# Patient Record
Sex: FEMALE | Race: WHITE | NOT HISPANIC OR LATINO | Employment: OTHER | URBAN - NONMETROPOLITAN AREA
[De-identification: names, ages, dates, MRNs, and addresses within clinical notes are randomized per-mention and may not be internally consistent; named-entity substitution may affect disease eponyms.]

---

## 2022-02-22 ENCOUNTER — HOSPITAL ENCOUNTER (OUTPATIENT)
Dept: MRI IMAGING | Facility: HOSPITAL | Age: 72
Discharge: HOME | End: 2022-02-22
Payer: COMMERCIAL

## 2022-02-22 DIAGNOSIS — M84.371A STRESS FRACTURE, RIGHT ANKLE, INITIAL ENCOUNTER FOR FRACTURE: ICD-10-CM

## 2022-02-22 DIAGNOSIS — M19.271: ICD-10-CM

## 2022-02-22 PROCEDURE — 73721 MRI JNT OF LWR EXTRE W/O DYE: CPT

## 2022-02-22 PROCEDURE — G1004 CDSM NDSC: HCPCS

## 2022-06-02 ENCOUNTER — APPOINTMENT (EMERGENCY)
Dept: RADIOLOGY | Facility: HOSPITAL | Age: 72
End: 2022-06-02
Payer: COMMERCIAL

## 2022-06-02 ENCOUNTER — APPOINTMENT (EMERGENCY)
Dept: CT IMAGING | Facility: HOSPITAL | Age: 72
End: 2022-06-02
Payer: COMMERCIAL

## 2022-06-02 ENCOUNTER — HOSPITAL ENCOUNTER (EMERGENCY)
Facility: HOSPITAL | Age: 72
Discharge: HOME | End: 2022-06-03
Attending: EMERGENCY MEDICINE | Admitting: EMERGENCY MEDICINE
Payer: COMMERCIAL

## 2022-06-02 DIAGNOSIS — R32 URINARY INCONTINENCE: ICD-10-CM

## 2022-06-02 DIAGNOSIS — M54.50 ACUTE LOW BACK PAIN: Primary | ICD-10-CM

## 2022-06-02 LAB
ALBUMIN SERPL BCP-MCNC: 3.7 G/DL (ref 3.5–5)
ALP SERPL-CCNC: 85 U/L (ref 46–116)
ALT SERPL W P-5'-P-CCNC: 13 U/L (ref 12–78)
ANION GAP SERPL CALCULATED.3IONS-SCNC: 10 MMOL/L (ref 4–13)
AST SERPL W P-5'-P-CCNC: 19 U/L (ref 5–45)
BASOPHILS # BLD AUTO: 0.03 THOUSANDS/ΜL (ref 0–0.1)
BASOPHILS NFR BLD AUTO: 0 % (ref 0–1)
BILIRUB SERPL-MCNC: 0.66 MG/DL (ref 0.2–1)
BILIRUB UR QL STRIP: NEGATIVE
BUN SERPL-MCNC: 15 MG/DL (ref 5–25)
CALCIUM SERPL-MCNC: 8 MG/DL (ref 8.3–10.1)
CHLORIDE SERPL-SCNC: 98 MMOL/L (ref 100–108)
CLARITY UR: CLEAR
CO2 SERPL-SCNC: 24 MMOL/L (ref 21–32)
COLOR UR: YELLOW
CREAT SERPL-MCNC: 1.03 MG/DL (ref 0.6–1.3)
EOSINOPHIL # BLD AUTO: 0.02 THOUSAND/ΜL (ref 0–0.61)
EOSINOPHIL NFR BLD AUTO: 0 % (ref 0–6)
ERYTHROCYTE [DISTWIDTH] IN BLOOD BY AUTOMATED COUNT: 12.6 % (ref 11.6–15.1)
GFR SERPL CREATININE-BSD FRML MDRD: 54 ML/MIN/1.73SQ M
GLUCOSE SERPL-MCNC: 155 MG/DL (ref 65–140)
GLUCOSE UR STRIP-MCNC: NEGATIVE MG/DL
HCT VFR BLD AUTO: 37.7 % (ref 34.8–46.1)
HGB BLD-MCNC: 12.2 G/DL (ref 11.5–15.4)
HGB UR QL STRIP.AUTO: ABNORMAL
IMM GRANULOCYTES # BLD AUTO: 0.06 THOUSAND/UL (ref 0–0.2)
IMM GRANULOCYTES NFR BLD AUTO: 1 % (ref 0–2)
KETONES UR STRIP-MCNC: ABNORMAL MG/DL
LACTATE SERPL-SCNC: 1.4 MMOL/L (ref 0.5–2)
LEUKOCYTE ESTERASE UR QL STRIP: NEGATIVE
LYMPHOCYTES # BLD AUTO: 0.87 THOUSANDS/ΜL (ref 0.6–4.47)
LYMPHOCYTES NFR BLD AUTO: 7 % (ref 14–44)
MCH RBC QN AUTO: 30 PG (ref 26.8–34.3)
MCHC RBC AUTO-ENTMCNC: 32.4 G/DL (ref 31.4–37.4)
MCV RBC AUTO: 93 FL (ref 82–98)
MONOCYTES # BLD AUTO: 0.81 THOUSAND/ΜL (ref 0.17–1.22)
MONOCYTES NFR BLD AUTO: 7 % (ref 4–12)
NEUTROPHILS # BLD AUTO: 10.57 THOUSANDS/ΜL (ref 1.85–7.62)
NEUTS SEG NFR BLD AUTO: 85 % (ref 43–75)
NITRITE UR QL STRIP: NEGATIVE
NRBC BLD AUTO-RTO: 0 /100 WBCS
PH UR STRIP.AUTO: 6 [PH]
PLATELET # BLD AUTO: 230 THOUSANDS/UL (ref 149–390)
PMV BLD AUTO: 10.5 FL (ref 8.9–12.7)
POTASSIUM SERPL-SCNC: 3.5 MMOL/L (ref 3.5–5.3)
PROCALCITONIN SERPL-MCNC: 0.08 NG/ML
PROT SERPL-MCNC: 6.9 G/DL (ref 6.4–8.2)
PROT UR STRIP-MCNC: NEGATIVE MG/DL
RBC # BLD AUTO: 4.07 MILLION/UL (ref 3.81–5.12)
SODIUM SERPL-SCNC: 132 MMOL/L (ref 136–145)
SP GR UR STRIP.AUTO: 1.02 (ref 1–1.03)
UROBILINOGEN UR QL STRIP.AUTO: 0.2 E.U./DL
WBC # BLD AUTO: 12.36 THOUSAND/UL (ref 4.31–10.16)

## 2022-06-02 PROCEDURE — 99285 EMERGENCY DEPT VISIT HI MDM: CPT | Performed by: EMERGENCY MEDICINE

## 2022-06-02 PROCEDURE — 81001 URINALYSIS AUTO W/SCOPE: CPT | Performed by: EMERGENCY MEDICINE

## 2022-06-02 PROCEDURE — 96374 THER/PROPH/DIAG INJ IV PUSH: CPT

## 2022-06-02 PROCEDURE — 99284 EMERGENCY DEPT VISIT MOD MDM: CPT

## 2022-06-02 PROCEDURE — 83605 ASSAY OF LACTIC ACID: CPT | Performed by: EMERGENCY MEDICINE

## 2022-06-02 PROCEDURE — 87077 CULTURE AEROBIC IDENTIFY: CPT | Performed by: EMERGENCY MEDICINE

## 2022-06-02 PROCEDURE — 85025 COMPLETE CBC W/AUTO DIFF WBC: CPT | Performed by: EMERGENCY MEDICINE

## 2022-06-02 PROCEDURE — 87040 BLOOD CULTURE FOR BACTERIA: CPT | Performed by: EMERGENCY MEDICINE

## 2022-06-02 PROCEDURE — 74176 CT ABD & PELVIS W/O CONTRAST: CPT

## 2022-06-02 PROCEDURE — NC001 PR NO CHARGE: Performed by: EMERGENCY MEDICINE

## 2022-06-02 PROCEDURE — 96375 TX/PRO/DX INJ NEW DRUG ADDON: CPT

## 2022-06-02 PROCEDURE — 93005 ELECTROCARDIOGRAM TRACING: CPT

## 2022-06-02 PROCEDURE — 71045 X-RAY EXAM CHEST 1 VIEW: CPT

## 2022-06-02 PROCEDURE — 36415 COLL VENOUS BLD VENIPUNCTURE: CPT | Performed by: EMERGENCY MEDICINE

## 2022-06-02 PROCEDURE — 80053 COMPREHEN METABOLIC PANEL: CPT | Performed by: EMERGENCY MEDICINE

## 2022-06-02 PROCEDURE — 84145 PROCALCITONIN (PCT): CPT | Performed by: EMERGENCY MEDICINE

## 2022-06-02 PROCEDURE — 96361 HYDRATE IV INFUSION ADD-ON: CPT

## 2022-06-02 PROCEDURE — 87154 CUL TYP ID BLD PTHGN 6+ TRGT: CPT | Performed by: EMERGENCY MEDICINE

## 2022-06-02 RX ORDER — SIMVASTATIN 10 MG
TABLET ORAL
COMMUNITY

## 2022-06-02 RX ORDER — NAPROXEN 250 MG/1
250 TABLET ORAL
COMMUNITY
Start: 2022-01-10

## 2022-06-02 RX ORDER — LEVOTHYROXINE SODIUM 0.05 MG/1
TABLET ORAL
COMMUNITY
Start: 2022-05-24

## 2022-06-02 RX ORDER — SODIUM CHLORIDE 9 MG/ML
125 INJECTION, SOLUTION INTRAVENOUS CONTINUOUS
Status: DISCONTINUED | OUTPATIENT
Start: 2022-06-02 | End: 2022-06-03 | Stop reason: HOSPADM

## 2022-06-02 RX ORDER — ONDANSETRON 2 MG/ML
4 INJECTION INTRAMUSCULAR; INTRAVENOUS ONCE
Status: COMPLETED | OUTPATIENT
Start: 2022-06-02 | End: 2022-06-02

## 2022-06-02 RX ORDER — KETOROLAC TROMETHAMINE 30 MG/ML
15 INJECTION, SOLUTION INTRAMUSCULAR; INTRAVENOUS ONCE
Status: COMPLETED | OUTPATIENT
Start: 2022-06-02 | End: 2022-06-02

## 2022-06-02 RX ORDER — PIMAVANSERIN TARTRATE 34 MG/1
CAPSULE ORAL
COMMUNITY
Start: 2022-03-23

## 2022-06-02 RX ORDER — OMEPRAZOLE 20 MG/1
TABLET, DELAYED RELEASE ORAL
COMMUNITY

## 2022-06-02 RX ORDER — EZETIMIBE 10 MG/1
TABLET ORAL
COMMUNITY
Start: 2022-05-24

## 2022-06-02 RX ADMIN — ONDANSETRON 4 MG: 2 INJECTION INTRAMUSCULAR; INTRAVENOUS at 22:48

## 2022-06-02 RX ADMIN — KETOROLAC TROMETHAMINE 15 MG: 30 INJECTION, SOLUTION INTRAMUSCULAR at 22:49

## 2022-06-02 RX ADMIN — SODIUM CHLORIDE 500 ML: 0.9 INJECTION, SOLUTION INTRAVENOUS at 22:52

## 2022-06-02 RX ADMIN — SODIUM CHLORIDE 125 ML/HR: 0.9 INJECTION, SOLUTION INTRAVENOUS at 23:44

## 2022-06-03 VITALS
WEIGHT: 209.88 LBS | OXYGEN SATURATION: 97 % | SYSTOLIC BLOOD PRESSURE: 140 MMHG | TEMPERATURE: 98 F | HEIGHT: 63 IN | DIASTOLIC BLOOD PRESSURE: 73 MMHG | RESPIRATION RATE: 19 BRPM | BODY MASS INDEX: 37.19 KG/M2 | HEART RATE: 86 BPM

## 2022-06-03 LAB
ATRIAL RATE: 95 BPM
BACTERIA UR QL AUTO: NORMAL /HPF
COARSE GRAN CASTS URNS QL MICRO: NORMAL /LPF
NON-SQ EPI CELLS URNS QL MICRO: NORMAL /HPF
P AXIS: 52 DEGREES
PR INTERVAL: 134 MS
QRS AXIS: -39 DEGREES
QRSD INTERVAL: 86 MS
QT INTERVAL: 366 MS
QTC INTERVAL: 459 MS
RBC #/AREA URNS AUTO: NORMAL /HPF
T WAVE AXIS: 81 DEGREES
VENTRICULAR RATE: 95 BPM
WBC #/AREA URNS AUTO: NORMAL /HPF

## 2022-06-03 NOTE — ED PROVIDER NOTES
History  Chief Complaint   Patient presents with    Back Pain     Lower back pain that radiates across and down both legs  Pt also complaining of Shortness of breath      77-year-old female with a history of Parkinson's disease presents emergency room with chief complaint of fever at home, bilateral flank pain and urinary incontinence  Patient reports a chronic history of mild urinary incontinence  She reports tonight while getting ready for bed it was much worse  Denies any dysuria or polyuria  No urgency or frequency  Also reports that she had some mild nausea and which she describes as mild regurgitation  She had reported shortness of breath  to nursing staff denies this to me at this time  No chest pain  Patient does report some radiation of the pain down into her legs  Patient reports that she does have history of sciatica as well  She reports that some of this radiation is chronic  History provided by:  Patient  Fever - 9 weeks to 74 years  Temp source:  Subjective  Severity:  Unable to specify  Onset quality:  Unable to specify  Progression:  Resolved  Chronicity:  New  Relieved by:  None tried  Worsened by:  Nothing  Associated symptoms: nausea and vomiting    Associated symptoms: no chest pain, no chills, no confusion, no congestion, no cough, no diarrhea, no dysuria, no ear pain, no headaches, no myalgias, no rash, no rhinorrhea and no sore throat    Associated symptoms comment:  Incontinence  Bilateral flank pain      Prior to Admission Medications   Prescriptions Last Dose Informant Patient Reported? Taking?    Pimavanserin Tartrate (Nuplazid) 34 MG CAPS   Yes Yes   carbidopa-levodopa (SINEMET)  mg per tablet   Yes No   Sig: Take 1 tablet by mouth Three times a day   ezetimibe (ZETIA) 10 mg tablet   Yes Yes   Sig: Take by mouth   levothyroxine 50 mcg tablet   Yes Yes   Sig: TAKE 1 TABLET BY MOUTH DAILY AT LEAST 30 MINUTES PRIOR TO FIRST MEAL OF THE DAY OR OTHER MEDICATIONS metoprolol tartrate (LOPRESSOR) 25 mg tablet   Yes Yes   Sig: Take by mouth   naproxen (NAPROSYN) 250 mg tablet   Yes Yes   Sig: Take 250 mg by mouth   omeprazole (PriLOSEC OTC) 20 MG tablet   Yes No   Sig: Take by mouth   simvastatin (ZOCOR) 10 mg tablet   Yes No   Sig: Take by mouth      Facility-Administered Medications: None       Past Medical History:   Diagnosis Date    Parkinson disease ([facility] [address] )        History reviewed  No pertinent surgical history  History reviewed  No pertinent family history  I have reviewed and agree with the history as documented  E-Cigarette/Vaping     E-Cigarette/Vaping Substances     Social History     Tobacco Use    Smoking status: Never Smoker    Smokeless tobacco: Never Used   Substance Use Topics    Alcohol use: Not Currently    Drug use: Never       Review of Systems   Constitutional: Positive for fever  Negative for activity change, chills and fatigue  HENT: Negative for congestion, ear pain, rhinorrhea, sinus pressure and sore throat  Eyes: Negative  Respiratory: Positive for shortness of breath (Resolved)  Negative for cough, chest tightness and wheezing  Cardiovascular: Negative  Negative for chest pain, palpitations and leg swelling  Gastrointestinal: Positive for nausea and vomiting  Negative for abdominal pain and diarrhea  Endocrine: Negative  Genitourinary: Positive for flank pain  Negative for difficulty urinating, dysuria, frequency and urgency  Musculoskeletal: Positive for back pain  Negative for arthralgias and myalgias  Skin: Negative  Negative for pallor and rash  Allergic/Immunologic: Negative  Neurological: Positive for weakness  Negative for dizziness and headaches  Hematological: Negative  Psychiatric/Behavioral: Negative  Negative for confusion  All other systems reviewed and are negative  Physical Exam  Physical Exam  Vitals and nursing note reviewed  Constitutional:       General: She is awake   She is in acute distress  Appearance: Normal appearance  She is well-developed  She is obese  She is not ill-appearing or toxic-appearing  HENT:      Head: Normocephalic and atraumatic  Hair is normal       Jaw: No pain on movement  Right Ear: External ear normal       Left Ear: External ear normal       Nose: Nose normal    Eyes:      General: Lids are normal  No scleral icterus  Extraocular Movements: Extraocular movements intact  Pupils: Pupils are equal, round, and reactive to light  Cardiovascular:      Rate and Rhythm: Normal rate and regular rhythm  Heart sounds: Normal heart sounds  No murmur heard  Pulmonary:      Effort: Pulmonary effort is normal  No respiratory distress  Breath sounds: Normal breath sounds  No stridor  No wheezing or rales  Abdominal:      General: Abdomen is flat  There is no distension  Palpations: Abdomen is soft  Tenderness: There is abdominal tenderness  There is right CVA tenderness and left CVA tenderness  There is no guarding  Musculoskeletal:         General: No deformity  Normal range of motion  Cervical back: Normal range of motion and neck supple  Skin:     General: Skin is warm and dry  Coloration: Skin is not jaundiced or pale  Findings: No rash  Neurological:      Mental Status: She is alert and oriented to person, place, and time  Mental status is at baseline  Cranial Nerves: No cranial nerve deficit  Psychiatric:         Attention and Perception: Attention normal          Mood and Affect: Mood normal          Speech: Speech normal          Behavior: Behavior normal          Thought Content:  Thought content normal          Judgment: Judgment normal          Vital Signs  ED Triage Vitals [06/02/22 2203]   Temperature Pulse Respirations Blood Pressure SpO2   98 °F (36 7 °C) 97 16 (!) 190/90 97 %      Temp Source Heart Rate Source Patient Position - Orthostatic VS BP Location FiO2 (%)   Temporal Monitor Sitting Right arm --      Pain Score       10 - Worst Possible Pain           Vitals:    06/02/22 2203   BP: (!) 190/90   Pulse: 97   Patient Position - Orthostatic VS: Sitting         Visual Acuity      ED Medications  Medications   sodium chloride 0 9 % bolus 500 mL (500 mL Intravenous New Bag 6/2/22 2252)   sodium chloride 0 9 % infusion (has no administration in time range)   ondansetron (ZOFRAN) injection 4 mg (4 mg Intravenous Given 6/2/22 2248)   ketorolac (TORADOL) injection 15 mg (15 mg Intravenous Given 6/2/22 2249)       Diagnostic Studies  Results Reviewed     Procedure Component Value Units Date/Time    UA w Reflex to Microscopic w Reflex to Culture [[id number]] Collected: 06/02/22 2337    Lab Status: No result Specimen: Urine, Clean Catch     Lactic acid [[id number]]  (Normal) Collected: 06/02/22 2239    Lab Status: Final result Specimen: Blood from Arm, Right Updated: 06/02/22 2310     LACTIC ACID 1 4 mmol/L     Narrative:      Result may be elevated if tourniquet was used during collection      Procalcitonin [[id number]]  (Normal) Collected: 06/02/22 2239    Lab Status: Final result Specimen: Blood from Arm, Right Updated: 06/02/22 2309     Procalcitonin 0 08 ng/ml     Comprehensive metabolic panel [[id number]]  (Abnormal) Collected: 06/02/22 2239    Lab Status: Final result Specimen: Blood from Arm, Right Updated: 06/02/22 2305     Sodium 132 mmol/L      Potassium 3 5 mmol/L      Chloride 98 mmol/L      CO2 24 mmol/L      ANION GAP 10 mmol/L      BUN 15 mg/dL      Creatinine 1 03 mg/dL      Glucose 155 mg/dL      Calcium 8 0 mg/dL      AST 19 U/L      ALT 13 U/L      Alkaline Phosphatase 85 U/L      Total Protein 6 9 g/dL      Albumin 3 7 g/dL      Total Bilirubin 0 66 mg/dL      eGFR 54 ml/min/1 73sq m     Narrative:      [place] guidelines for Chronic Kidney Disease (CKD):     Stage 1 with normal or high GFR (GFR > 90 mL/min/1 73 square meters)    Stage 2 Mild CKD (GFR = 60-89 mL/min/1 73 square meters)    Stage 3A Moderate CKD (GFR = 45-59 mL/min/1 73 square meters)    Stage 3B Moderate CKD (GFR = 30-44 mL/min/1 73 square meters)    Stage 4 Severe CKD (GFR = 15-29 mL/min/1 73 square meters)    Stage 5 End Stage CKD (GFR <15 mL/min/1 73 square meters)  Note: GFR calculation is accurate only with a steady state creatinine    CBC and differential [[id number]]  (Abnormal) Collected: 06/02/22 2239    Lab Status: Final result Specimen: Blood from Arm, Right Updated: 06/02/22 2301     WBC 12 36 Thousand/uL      RBC 4 07 Million/uL      Hemoglobin 12 2 g/dL      Hematocrit 37 7 %      MCV 93 fL      MCH 30 0 pg      MCHC 32 4 g/dL      RDW 12 6 %      MPV 10 5 fL      Platelets 016 Thousands/uL      nRBC 0 /100 WBCs      Neutrophils Relative 85 %      Immat GRANS % 1 %      Lymphocytes Relative 7 %      Monocytes Relative 7 %      Eosinophils Relative 0 %      Basophils Relative 0 %      Neutrophils Absolute 10 57 Thousands/µL      Immature Grans Absolute 0 06 Thousand/uL      Lymphocytes Absolute 0 87 Thousands/µL      Monocytes Absolute 0 81 Thousand/µL      Eosinophils Absolute 0 02 Thousand/µL      Basophils Absolute 0 03 Thousands/µL     Blood culture #1 [[id number]] Collected: 06/02/22 2252    Lab Status: In process Specimen: Blood from Arm, Left Updated: 06/02/22 2256    Blood culture #2 [[id number]] Collected: 06/02/22 2239    Lab Status:  In process Specimen: Blood from Arm, Right Updated: 06/02/22 2242                 XR chest portable    (Results Pending)   CT renal stone study abdomen pelvis wo contrast    (Results Pending)              Procedures  ECG 12 Lead Documentation Only    Date/Time: 6/2/2022 10:56 PM  Performed by: Sherry Black DO  Authorized by: Sherry Black DO     Indications / Diagnosis:  Weakness  ECG reviewed by me, the ED Provider: yes    Patient location:  ED  Previous ECG:     Previous ECG:  Unavailable  Interpretation:     Interpretation: non-specific Rate:     ECG rate assessment: normal    Rhythm:     Rhythm: sinus rhythm    Ectopy:     Ectopy: none    QRS:     QRS axis:  Left  Conduction:     Conduction: normal    ST segments:     ST segments:  Non-specific  T waves:     T waves: non-specific               ED Course  ED Course as of 06/02/22 2341   Thu Jun 02, 2022   2323  at bedside  Is uncertain with regards to the patient's temperature at home but believes it may have been "99 8"   2339 Signed out to Dr Reagan Tom 22yo+    Zane uBrk Most Recent Value   SBIRT (23 yo +)    In order to provide better care to our patients, we are screening all of our patients for alcohol and drug use  Would it be okay to ask you these screening questions? No Filed at: 06/02/2022 2253                    [facility]  Number of Diagnoses or Management Options  Acute low back pain: established and worsening  Urinary incontinence: established and worsening     Amount and/or Complexity of Data Reviewed  Clinical lab tests: ordered and reviewed  Tests in the radiology section of CPT®: ordered and reviewed  Obtain history from someone other than the patient: yes    Risk of Complications, Morbidity, and/or Mortality  Presenting problems: moderate  Diagnostic procedures: moderate  Management options: moderate    Patient Progress  Patient progress: stable      Disposition  Final diagnoses:   Acute low back pain   Urinary incontinence     Time reflects when diagnosis was documented in both MDM as applicable and the Disposition within this note     Time User Action Codes Description Comment    6/2/2022 11:29 PM Liban Kebede Add [M54 50] Acute low back pain     6/2/2022 11:29 PM Keturah Lopez [R32] Urinary incontinence       ED Disposition     None      Follow-up Information    None         Patient's Medications   Discharge Prescriptions    No medications on file       No discharge procedures on file      PDMP Review     None          ED Provider  Electronically Signed by           Joel Sparks DO  06/02/22 [address] [id number], DO  06/02/22 6690

## 2022-06-03 NOTE — ED PROVIDER NOTES
Patient signed out to me by Dr Darío Izaguirre:    Sign-out:  70-year-old female who is pending CT imaging for back pain to rule out stone  Pending results, patient may be discharged  During my care, results returned as below  Patient reassessed and stated she felt improved  Was agreeable to discharge  RESULTS:  Results Reviewed     Procedure Component Value Units Date/Time    Urine Microscopic [[id number]] Collected: 06/02/22 2337    Lab Status: Final result Specimen: Urine, Clean Catch Updated: 06/03/22 0018     RBC, UA 2-4 /hpf      WBC, UA 0-5 /hpf      Epithelial Cells Occasional /hpf      Bacteria, UA None Seen /hpf      COARSE GRANULAR CASTS 0-1 /lpf     UA w Reflex to Microscopic w Reflex to Culture [[id number]]  (Abnormal) Collected: 06/02/22 2337    Lab Status: Final result Specimen: Urine, Clean Catch Updated: 06/02/22 2353     Color, UA Yellow     Clarity, UA Clear     Specific Gravity, UA 1 025     pH, UA 6 0     Leukocytes, UA Negative     Nitrite, UA Negative     Protein, UA Negative mg/dl      Glucose, UA Negative mg/dl      Ketones, UA 15 (1+) mg/dl      Urobilinogen, UA 0 2 E U /dl      Bilirubin, UA Negative     Blood, UA Moderate    Lactic acid [[id number]]  (Normal) Collected: 06/02/22 2239    Lab Status: Final result Specimen: Blood from Arm, Right Updated: 06/02/22 2310     LACTIC ACID 1 4 mmol/L     Narrative:      Result may be elevated if tourniquet was used during collection      Procalcitonin [[id number]]  (Normal) Collected: 06/02/22 2239    Lab Status: Final result Specimen: Blood from Arm, Right Updated: 06/02/22 2309     Procalcitonin 0 08 ng/ml     Comprehensive metabolic panel [[id number]]  (Abnormal) Collected: 06/02/22 2239    Lab Status: Final result Specimen: Blood from Arm, Right Updated: 06/02/22 2305     Sodium 132 mmol/L      Potassium 3 5 mmol/L      Chloride 98 mmol/L      CO2 24 mmol/L      ANION GAP 10 mmol/L      BUN 15 mg/dL      Creatinine 1 03 mg/dL      Glucose 155 mg/dL Calcium 8 0 mg/dL      AST 19 U/L      ALT 13 U/L      Alkaline Phosphatase 85 U/L      Total Protein 6 9 g/dL      Albumin 3 7 g/dL      Total Bilirubin 0 66 mg/dL      eGFR 54 ml/min/1 73sq m     Narrative:      [place] guidelines for Chronic Kidney Disease (CKD):     Stage 1 with normal or high GFR (GFR > 90 mL/min/1 73 square meters)    Stage 2 Mild CKD (GFR = 60-89 mL/min/1 73 square meters)    Stage 3A Moderate CKD (GFR = 45-59 mL/min/1 73 square meters)    Stage 3B Moderate CKD (GFR = 30-44 mL/min/1 73 square meters)    Stage 4 Severe CKD (GFR = 15-29 mL/min/1 73 square meters)    Stage 5 End Stage CKD (GFR <15 mL/min/1 73 square meters)  Note: GFR calculation is accurate only with a steady state creatinine    CBC and differential [[id number]]  (Abnormal) Collected: 06/02/22 2239    Lab Status: Final result Specimen: Blood from Arm, Right Updated: 06/02/22 2301     WBC 12 36 Thousand/uL      RBC 4 07 Million/uL      Hemoglobin 12 2 g/dL      Hematocrit 37 7 %      MCV 93 fL      MCH 30 0 pg      MCHC 32 4 g/dL      RDW 12 6 %      MPV 10 5 fL      Platelets 109 Thousands/uL      nRBC 0 /100 WBCs      Neutrophils Relative 85 %      Immat GRANS % 1 %      Lymphocytes Relative 7 %      Monocytes Relative 7 %      Eosinophils Relative 0 %      Basophils Relative 0 %      Neutrophils Absolute 10 57 Thousands/µL      Immature Grans Absolute 0 06 Thousand/uL      Lymphocytes Absolute 0 87 Thousands/µL      Monocytes Absolute 0 81 Thousand/µL      Eosinophils Absolute 0 02 Thousand/µL      Basophils Absolute 0 03 Thousands/µL     Blood culture #1 [[id number]] Collected: 06/02/22 2252    Lab Status: In process Specimen: Blood from Arm, Left Updated: 06/02/22 2256    Blood culture #2 [[id number]] Collected: 06/02/22 2239    Lab Status:  In process Specimen: Blood from Arm, Right Updated: 06/02/22 2242          CT renal stone study abdomen pelvis wo contrast   Final Result      No acute intra-abdominal abnormality  No free air or free fluid  No hydronephrosis or intrarenal calculus           Workstation performed: [id number]         XR chest portable    (Results Pending)       Vitals:    06/02/22 2203 06/03/22 0000   BP: (!) 190/90    TempSrc: Temporal    Pulse: 97 81   Resp: 16 19   Patient Position - Orthostatic VS: Sitting    Temp: 98 °F (36 7 °C)      Dispo:  Discharge with outpatient follow-up     Armando Rivas MD  06/03/22 0905

## 2022-06-07 LAB
BACTERIA BLD CULT: ABNORMAL
BACTERIA BLD CULT: ABNORMAL
GRAM STN SPEC: ABNORMAL
S AUREUS+CONS DNA BLD POS NAA+NON-PROBE: DETECTED
STREPTOCOCCUS DNA BLD POS NAA+NON-PROBE: DETECTED

## 2022-06-08 LAB — BACTERIA BLD CULT: NORMAL

## 2022-10-30 ENCOUNTER — APPOINTMENT (EMERGENCY)
Dept: RADIOLOGY | Facility: HOSPITAL | Age: 72
End: 2022-10-30

## 2022-10-30 ENCOUNTER — HOSPITAL ENCOUNTER (INPATIENT)
Facility: HOSPITAL | Age: 72
LOS: 4 days | Discharge: NURSING, REHAB OR LONG-TERM CARE | End: 2022-11-04
Attending: EMERGENCY MEDICINE | Admitting: INTERNAL MEDICINE

## 2022-10-30 DIAGNOSIS — S82.892A CLOSED FRACTURE DISLOCATION OF LEFT ANKLE, INITIAL ENCOUNTER: Primary | ICD-10-CM

## 2022-10-30 DIAGNOSIS — R35.0 FREQUENT URINATION: ICD-10-CM

## 2022-10-30 DIAGNOSIS — H04.129 DRY EYE: ICD-10-CM

## 2022-10-30 PROBLEM — J44.9 CHRONIC OBSTRUCTIVE PULMONARY DISEASE (HCC): Status: ACTIVE | Noted: 2021-08-19

## 2022-10-30 PROBLEM — K21.9 GASTRO-ESOPHAGEAL REFLUX DISEASE WITHOUT ESOPHAGITIS: Status: ACTIVE | Noted: 2022-01-10

## 2022-10-30 PROBLEM — G20 PARKINSON'S DISEASE (HCC): Status: ACTIVE | Noted: 2022-10-30

## 2022-10-30 PROBLEM — G20.A1 PARKINSON'S DISEASE: Status: ACTIVE | Noted: 2022-10-30

## 2022-10-30 PROBLEM — I16.0 HYPERTENSIVE URGENCY: Status: ACTIVE | Noted: 2022-10-30
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
## 2022-10-30 LAB
ANION GAP SERPL CALCULATED.3IONS-SCNC: 6 MMOL/L (ref 4–13)
BUN SERPL-MCNC: 16 MG/DL (ref 5–25)
CALCIUM SERPL-MCNC: 8.5 MG/DL (ref 8.3–10.1)
CHLORIDE SERPL-SCNC: 104 MMOL/L (ref 96–108)
CO2 SERPL-SCNC: 29 MMOL/L (ref 21–32)
CREAT SERPL-MCNC: 0.91 MG/DL (ref 0.6–1.3)
ERYTHROCYTE [DISTWIDTH] IN BLOOD BY AUTOMATED COUNT: 12.4 % (ref 11.6–15.1)
GFR SERPL CREATININE-BSD FRML MDRD: 63 ML/MIN/1.73SQ M
GLUCOSE SERPL-MCNC: 196 MG/DL (ref 65–140)
HCT VFR BLD AUTO: 39.5 % (ref 34.8–46.1)
HGB BLD-MCNC: 12.1 G/DL (ref 11.5–15.4)
MCH RBC QN AUTO: 29 PG (ref 26.8–34.3)
MCHC RBC AUTO-ENTMCNC: 30.6 G/DL (ref 31.4–37.4)
MCV RBC AUTO: 95 FL (ref 82–98)
PLATELET # BLD AUTO: 283 THOUSANDS/UL (ref 149–390)
PMV BLD AUTO: 10.1 FL (ref 8.9–12.7)
POTASSIUM SERPL-SCNC: 4.2 MMOL/L (ref 3.5–5.3)
RBC # BLD AUTO: 4.17 MILLION/UL (ref 3.81–5.12)
SODIUM SERPL-SCNC: 139 MMOL/L (ref 135–147)
TSH SERPL DL<=0.05 MIU/L-ACNC: 1.75 UIU/ML (ref 0.45–4.5)
WBC # BLD AUTO: 12.98 THOUSAND/UL (ref 4.31–10.16)

## 2022-10-30 PROCEDURE — 0QSKXZZ REPOSITION LEFT FIBULA, EXTERNAL APPROACH: ICD-10-PCS | Performed by: EMERGENCY MEDICINE

## 2022-10-30 RX ORDER — POLYETHYLENE GLYCOL 3350 17 G/17G
17 POWDER, FOR SOLUTION ORAL DAILY PRN
Status: DISCONTINUED | OUTPATIENT
Start: 2022-10-30 | End: 2022-11-04 | Stop reason: HOSPADM

## 2022-10-30 RX ORDER — PANTOPRAZOLE SODIUM 20 MG/1
20 TABLET, DELAYED RELEASE ORAL
Status: DISCONTINUED | OUTPATIENT
Start: 2022-10-31 | End: 2022-11-04 | Stop reason: HOSPADM

## 2022-10-30 RX ORDER — OXYCODONE HYDROCHLORIDE 5 MG/1
5 TABLET ORAL ONCE
Status: COMPLETED | OUTPATIENT
Start: 2022-10-30 | End: 2022-10-30

## 2022-10-30 RX ORDER — MELATONIN
1000 DAILY
Status: DISCONTINUED | OUTPATIENT
Start: 2022-10-31 | End: 2022-11-04 | Stop reason: HOSPADM

## 2022-10-30 RX ORDER — AMOXICILLIN 250 MG
1 CAPSULE ORAL 2 TIMES DAILY
Status: DISCONTINUED | OUTPATIENT
Start: 2022-10-30 | End: 2022-11-03

## 2022-10-30 RX ORDER — PRAVASTATIN SODIUM 20 MG
20 TABLET ORAL
Status: DISCONTINUED | OUTPATIENT
Start: 2022-10-30 | End: 2022-11-04 | Stop reason: HOSPADM

## 2022-10-30 RX ORDER — MELATONIN
1000 DAILY
COMMUNITY

## 2022-10-30 RX ORDER — ENOXAPARIN SODIUM 100 MG/ML
40 INJECTION SUBCUTANEOUS DAILY
Status: DISCONTINUED | OUTPATIENT
Start: 2022-10-31 | End: 2022-10-31

## 2022-10-30 RX ORDER — ASPIRIN 81 MG/1
81 TABLET, CHEWABLE ORAL DAILY
COMMUNITY

## 2022-10-30 RX ORDER — OXYCODONE HYDROCHLORIDE 5 MG/1
5 TABLET ORAL EVERY 6 HOURS PRN
Qty: 6 TABLET | Refills: 0 | Status: SHIPPED | OUTPATIENT
Start: 2022-10-30 | End: 2022-11-04 | Stop reason: SDUPTHER

## 2022-10-30 RX ORDER — OXYCODONE HYDROCHLORIDE 10 MG/1
10 TABLET ORAL EVERY 4 HOURS PRN
Status: DISCONTINUED | OUTPATIENT
Start: 2022-10-30 | End: 2022-11-04 | Stop reason: HOSPADM

## 2022-10-30 RX ORDER — PROPOFOL 10 MG/ML
100 INJECTION, EMULSION INTRAVENOUS ONCE
Status: COMPLETED | OUTPATIENT
Start: 2022-10-30 | End: 2022-10-30

## 2022-10-30 RX ORDER — METOPROLOL TARTRATE 5 MG/5ML
5 INJECTION INTRAVENOUS EVERY 8 HOURS PRN
Status: DISCONTINUED | OUTPATIENT
Start: 2022-10-30 | End: 2022-11-03

## 2022-10-30 RX ORDER — ONDANSETRON 2 MG/ML
4 INJECTION INTRAMUSCULAR; INTRAVENOUS EVERY 6 HOURS PRN
Status: DISCONTINUED | OUTPATIENT
Start: 2022-10-30 | End: 2022-11-04 | Stop reason: HOSPADM

## 2022-10-30 RX ORDER — EZETIMIBE 10 MG/1
10 TABLET ORAL
Status: DISCONTINUED | OUTPATIENT
Start: 2022-10-30 | End: 2022-11-04 | Stop reason: HOSPADM

## 2022-10-30 RX ORDER — ACETAMINOPHEN 325 MG/1
650 TABLET ORAL EVERY 6 HOURS PRN
Status: DISCONTINUED | OUTPATIENT
Start: 2022-10-30 | End: 2022-10-31

## 2022-10-30 RX ORDER — OXYCODONE HYDROCHLORIDE 5 MG/1
5 TABLET ORAL EVERY 4 HOURS PRN
Status: DISCONTINUED | OUTPATIENT
Start: 2022-10-30 | End: 2022-11-04 | Stop reason: HOSPADM

## 2022-10-30 RX ORDER — LEVOTHYROXINE SODIUM 0.05 MG/1
50 TABLET ORAL
Status: DISCONTINUED | OUTPATIENT
Start: 2022-10-31 | End: 2022-11-04 | Stop reason: HOSPADM

## 2022-10-30 RX ORDER — ASPIRIN 81 MG/1
81 TABLET, CHEWABLE ORAL DAILY
Status: DISCONTINUED | OUTPATIENT
Start: 2022-10-31 | End: 2022-11-04 | Stop reason: HOSPADM

## 2022-10-30 RX ADMIN — PROPOFOL 80 MG: 10 INJECTION, EMULSION INTRAVENOUS at 14:37

## 2022-10-30 RX ADMIN — CARBIDOPA AND LEVODOPA 1 TABLET: 25; 100 TABLET ORAL at 17:49

## 2022-10-30 RX ADMIN — EZETIMIBE 10 MG: 10 TABLET ORAL at 22:09

## 2022-10-30 RX ADMIN — OXYCODONE HYDROCHLORIDE 5 MG: 5 TABLET ORAL at 22:19

## 2022-10-30 RX ADMIN — SENNOSIDES, DOCUSATE SODIUM 1 TABLET: 8.6; 5 TABLET ORAL at 18:42

## 2022-10-30 RX ADMIN — OXYCODONE HYDROCHLORIDE 5 MG: 5 TABLET ORAL at 17:58

## 2022-10-30 RX ADMIN — PRAVASTATIN SODIUM 20 MG: 20 TABLET ORAL at 17:49

## 2022-10-30 RX ADMIN — PIMAVANSERIN TARTRATE 34 MG: 34 CAPSULE ORAL at 22:09

## 2022-10-30 RX ADMIN — CARBIDOPA AND LEVODOPA 1 TABLET: 25; 100 TABLET ORAL at 22:11

## 2022-10-30 RX ADMIN — OXYCODONE HYDROCHLORIDE 5 MG: 5 TABLET ORAL at 13:39

## 2022-10-30 NOTE — ASSESSMENT & PLAN NOTE
· Continue PTA levothyroxine  · reports frequent dry mouth and voiding  · TSH wnl   · HgbA1C pending

## 2022-10-30 NOTE — ASSESSMENT & PLAN NOTE
· Noted intermittent 180s-190s in ED and on arrival to unit  · PTA Metoprolol tartrate 25 mg daily  · PRN pain control  · Prn lopressor 5mg    Much better controlled now due to adequate pain control   Optimized for procedure

## 2022-10-30 NOTE — ASSESSMENT & PLAN NOTE
· Presents with left ankle pain and swelling after fall down 2 cement steps twisting left ankle, unable to walk after    Not on anticoagulation  · X-ray with closed fracture and dislocation of left ankle  · Ortho consult  · PT/OT consult - likely after surgery

## 2022-10-30 NOTE — ASSESSMENT & PLAN NOTE
· Presents with left ankle pain and swelling after fall down 2 cement steps twisting left ankle, unable to walk after    Not on anticoagulation  · X-ray with closed fracture and dislocation of left ankle  · Ortho consult

## 2022-10-30 NOTE — H&P
[address]  H&P- [place] 1950, 70 y o  female MRN: [id number]  Unit/Bed#: -Ceci Encounter: [id number]  Primary Care Provider: Lyudmila Swanson DO   Date and time admitted to hospital: 10/30/2022  1:29 PM    * Closed fracture dislocation of left ankle  Assessment & Plan  · Pain, swelling inability to walk follow-up fall  · X-ray with dislocation and closed fracture left ankle (official read not available)  · ED left ankle reduced and splinted  · Pain control  · Orthopedic consult  · Neurovascular checks    Fall down steps  Assessment & Plan  · Presents with left ankle pain and swelling after fall down 2 cement steps twisting left ankle, unable to walk after  Not on anticoagulation  · X-ray with closed fracture and dislocation of left ankle  · Ortho consult      Hypertensive urgency  Assessment & Plan  · Noted intermittent 180s-190s in ED and on arrival to unit  · PTA Metoprolol tartrate 25 mg daily  · PRN pain control  · Prn lopressor 5mg      Parkinson's disease ([address] )  Assessment & Plan  · Continue PTA Sinemet    Gastro-esophageal reflux disease without esophagitis  Assessment & Plan  · PTA omeprazole    Acquired hypothyroidism  Assessment & Plan  · Continue PTA levothyroxine  · reports frequent dry mouth and voiding- check TSH and Ha1c    VTE Pharmacologic Prophylaxis: VTE Score: 9 Moderate Risk (Score 3-4) - Pharmacological DVT Prophylaxis Ordered: enoxaparin (Lovenox)  Code Status: Level 3 - DNAR and DNI   Discussion with family: pts  present in ED, pt states she will call him and ask to bring in meds  Anticipated Length of Stay: Patient will be admitted on an observation basis with an anticipated length of stay of less than 2 midnights secondary to Left ankle fracture secondary to fall pending ortho evaluation      Total Time for Visit, including Counseling / Coordination of Care: 45 minutes Greater than 50% of this total time spent on direct patient counseling and coordination of care  Chief Complaint:  Left ankle pain and swelling    History of Present Illness:  Jesus Ritchie is a 70 y o  female with a PMH of Parkinson's, GERD, hypothyroidism who presents with left ankle pain, swelling, inability to walk following a fall at [employer] down 2 cement steps  Patient states she tried to stand but was unable to, denies head strike, LOC, lightheaded dizziness reports that she missed the last 2 steps and twisted her ankle  Ankle reduced and splinted in the ED plan for orthopedic evaluation on Monday    Review of Systems:  Review of Systems   Neurological: Negative for dizziness, weakness, light-headedness and numbness  Past Medical and Surgical History:   Past Medical History:   Diagnosis Date   • HLD (hyperlipidemia)    • Hypothyroid    • Parkinson disease ([facility] [address] )        History reviewed  No pertinent surgical history  Meds/Allergies:  Prior to Admission medications    Medication Sig Start Date End Date Taking?  Authorizing Provider   aspirin 81 mg chewable tablet Chew 81 mg daily   Yes Historical Provider, MD   carbidopa-levodopa (SINEMET)  mg per tablet Take 1 tablet by mouth Three times a day   Yes Historical Provider, MD   cholecalciferol (VITAMIN D3) 1,000 units tablet Take 1,000 Units by mouth daily TAKES 2,000 UNITS   Yes Historical Provider, MD   ezetimibe (ZETIA) 10 mg tablet Take by mouth 5/24/22  Yes Historical Provider, MD   levothyroxine 50 mcg tablet TAKE 1 TABLET BY MOUTH DAILY AT LEAST 30 MINUTES PRIOR TO FIRST MEAL OF THE DAY OR OTHER MEDICATIONS 5/24/22  Yes Historical Provider, MD   metoprolol tartrate (LOPRESSOR) 25 mg tablet Take by mouth 12/8/21  Yes Historical Provider, MD   omeprazole (PriLOSEC OTC) 20 MG tablet Take by mouth   Yes Historical Provider, MD   oxyCODONE (ROXICODONE) 5 immediate release tablet Take 1 tablet (5 mg total) by mouth every 6 (six) hours as needed for moderate pain for up to 6 doses Max Daily Amount: 20 mg 10/30/22  Yes Marylee Dunn Amadio,    Pimavanserin Tartrate (Nuplazid) 34 MG CAPS daily at bedtime 3/23/22  Yes Historical Provider, MD   simvastatin (ZOCOR) 10 mg tablet Take by mouth   Yes Historical Provider, MD   naproxen (NAPROSYN) 250 mg tablet Take 250 mg by mouth  Patient not taking: Reported on 10/30/2022 1/10/22   Historical Provider, MD     I have reviewed home medications with patient personally  Allergies: Allergies   Allergen Reactions   • Lidocaine Anaphylaxis   • Mushroom Extract Complex - Food Allergy Anaphylaxis and Throat Swelling   • Butamben-Tetracaine-Benzocaine Edema   • Codeine GI Intolerance     Upset stomach     • Sertraline Itching     Reaction unknown     • Statins Myalgia       Social History:  Marital Status: /Civil Union   Occupation:   Patient Pre-hospital Living Situation: With spouse  Patient Pre-hospital Level of Mobility: walks  Patient Pre-hospital Diet Restrictions: carb controlled  Substance Use History:   Social History     Substance and Sexual Activity   Alcohol Use Not Currently     Social History     Tobacco Use   Smoking Status Never Smoker   Smokeless Tobacco Never Used     Social History     Substance and Sexual Activity   Drug Use Never       Family History:  History reviewed  No pertinent family history  Physical Exam:     Vitals:   Blood Pressure: (!) 180/89 (10/30/22 1600)  Pulse: 80 (10/30/22 1600)  Temperature: 97 9 °F (36 6 °C) (10/30/22 1600)  Temp Source: Temporal (10/30/22 1334)  Respirations: 12 (10/30/22 1539)  Height: 5' 3" (160 cm) (10/30/22 1334)  Weight - Scale: 98 1 kg (216 lb 4 3 oz) (10/30/22 1334)  SpO2: 99 % (10/30/22 1600)    Physical Exam  Vitals and nursing note reviewed  Constitutional:       General: She is not in acute distress  Appearance: She is well-developed  She is obese  HENT:      Head: Normocephalic and atraumatic  Mouth/Throat:      Mouth: Mucous membranes are dry     Eyes:      Conjunctiva/sclera: Conjunctivae normal       Pupils: Pupils are equal, round, and reactive to light  Cardiovascular:      Rate and Rhythm: Normal rate and regular rhythm  Pulses: Normal pulses  Heart sounds: Normal heart sounds  No murmur heard  Pulmonary:      Effort: Pulmonary effort is normal  No respiratory distress  Breath sounds: Normal breath sounds  Abdominal:      General: Bowel sounds are normal  There is no distension  Palpations: Abdomen is soft  Tenderness: There is no abdominal tenderness  Musculoskeletal:         General: Signs of injury (Left foot and ankle in splint with Ace wrap  Sensation intact, +pulse, wiggles toes, extremity warm  Unable to evaluate ankle and foot ) present  Cervical back: Neck supple  Skin:     General: Skin is warm and dry  Capillary Refill: Capillary refill takes less than 2 seconds  Neurological:      General: No focal deficit present  Mental Status: She is alert  Motor: Tremor present  Comments: Baseline fine tremor history Parkinson's   Psychiatric:         Mood and Affect: Mood normal          Thought Content: Thought content normal          Additional Data:     Lab Results:                            Imaging: Reviewed radiology reports from this admission including: xray(s)  XR ankle 3+ views LEFT   ED Interpretation by Sukhdev Phillips DO (10/30 1419)   Fracture dislocation      XR ankle 3+ views LEFT    (Results Pending)       EKG and Other Studies Reviewed on Admission:   · EKG: No EKG obtained  ** Please Note: This note has been constructed using a voice recognition system   **

## 2022-10-30 NOTE — ASSESSMENT & PLAN NOTE
· Pain, swelling inability to walk follow-up fall  · X-ray with dislocation and closed fracture left ankle (official read not available)  · ED left ankle reduced and splinted  · Pain control  · Orthopedic consult  · Bimalleolar fracture subluxation left ankle   · Planning on potential surgery later today   · Patient is medically optimized for procedure   · Pre-op EKG with normal sinus rhythm, LVH and no changes from previous EKG  · Does have swelling around ankle - if there is blistering of skin procedure will be delayed - patient and orthopedics agree to not remove splint until later today just prior to surgery   · Neurovascular checks

## 2022-10-30 NOTE — ED PROVIDER NOTES
History  Chief Complaint   Patient presents with   • Fall     Pt presented to this ED via EMS c/o left ankle pain and swelling after missing last 2 cement steps twisting extremity and landing on right arm today at 1230  Denies hitting head/loc/blood thinners  45-year-old female describes left ankle pain after stumbled down 2 steps at [employer] just prior to arrival, unable to walk  No other injury  No oral anticoagulation      History provided by:  Patient  Fall  Mechanism of injury: fall    Injury location:  Leg  Leg injury location:  L ankle  Fall:     Fall occurred:  Down stairs    Height of fall:  Two steps  Prior to arrival data:     Loss of consciousness: no      Amnesic to event: no        Prior to Admission Medications   Prescriptions Last Dose Informant Patient Reported? Taking? Pimavanserin Tartrate (Nuplazid) 34 MG CAPS   Yes No   carbidopa-levodopa (SINEMET)  mg per tablet   Yes No   Sig: Take 1 tablet by mouth Three times a day   ezetimibe (ZETIA) 10 mg tablet   Yes No   Sig: Take by mouth   levothyroxine 50 mcg tablet   Yes No   Sig: TAKE 1 TABLET BY MOUTH DAILY AT LEAST 30 MINUTES PRIOR TO FIRST MEAL OF THE DAY OR OTHER MEDICATIONS   metoprolol tartrate (LOPRESSOR) 25 mg tablet   Yes No   Sig: Take by mouth   naproxen (NAPROSYN) 250 mg tablet   Yes No   Sig: Take 250 mg by mouth   omeprazole (PriLOSEC OTC) 20 MG tablet   Yes No   Sig: Take by mouth   simvastatin (ZOCOR) 10 mg tablet   Yes No   Sig: Take by mouth      Facility-Administered Medications: None       Past Medical History:   Diagnosis Date   • Parkinson disease ([facility] [address] )        History reviewed  No pertinent surgical history  History reviewed  No pertinent family history  I have reviewed and agree with the history as documented      E-Cigarette/Vaping   • E-Cigarette Use Never User      E-Cigarette/Vaping Substances     Social History     Tobacco Use   • Smoking status: Never Smoker   • Smokeless tobacco: Never Used   Vaping Use • Vaping Use: Never used   Substance Use Topics   • Alcohol use: Not Currently   • Drug use: Never       Review of Systems   All other systems reviewed and are negative  Physical Exam  Physical Exam  Vitals and nursing note reviewed  Constitutional:       Appearance: Normal appearance  Comments: Pleasant, comfortable-appearing   HENT:      Head: Normocephalic and atraumatic  Right Ear: External ear normal       Left Ear: External ear normal       Nose: Nose normal    Eyes:      Conjunctiva/sclera: Conjunctivae normal    Pulmonary:      Effort: Pulmonary effort is normal    Abdominal:      General: Abdomen is flat  Musculoskeletal:         General: No deformity  Cervical back: Neck supple  Comments: Left ankle generally tender with swelling, decreased range of motion, no heel or foot tenderness, no proximal fibular or knee tenderness, no hip/pelvic tenderness or deformity, no chest wall tenderness or deformity no upper extremity tenderness or deformity bilaterally   Skin:     Comments: Good color   Neurological:      General: No focal deficit present  Mental Status: She is alert     Psychiatric:         Mood and Affect: Mood normal          Vital Signs  ED Triage Vitals [10/30/22 1334]   Temperature Pulse Respirations Blood Pressure SpO2   (!) 97 3 °F (36 3 °C) 65 18 (!) 193/74 100 %      Temp Source Heart Rate Source Patient Position - Orthostatic VS BP Location FiO2 (%)   Temporal -- Lying Right arm --      Pain Score       10 - Worst Possible Pain           Vitals:    10/30/22 1450 10/30/22 1455 10/30/22 1500 10/30/22 1505   BP: 158/76 146/70 142/70 138/66   Pulse: (!) 53 (!) 51 56 (!) 51   Patient Position - Orthostatic VS:             Visual Acuity  Visual Acuity    Flowsheet Row Most Recent Value   L Pupil Size (mm) 3   R Pupil Size (mm) 3          ED Medications  Medications   oxyCODONE (ROXICODONE) IR tablet 5 mg (5 mg Oral Given 10/30/22 1339)   propofol (DIPRIVAN) 200 MG/20ML bolus injection 100 mg (80 mg Intravenous Given 10/30/22 1437)       Diagnostic Studies  Results Reviewed     None                 XR ankle 3+ views LEFT   ED Interpretation by Anastasia Alex DO (10/30 1419)   Fracture dislocation      XR ankle 3+ views LEFT    (Results Pending)              Procedures  Pre-Procedural Sedation  Performed by: Anastasia Alex DO  Authorized by: Anastasia Alex DO     Consent:     Consent obtained:  Verbal and written    Consent given by:  Patient and spouse    Risks discussed:   Allergic reaction, dysrhythmia, inadequate sedation, nausea, vomiting, respiratory compromise necessitating ventilatory assistance and intubation, prolonged sedation necessitating reversal and prolonged hypoxia resulting in organ damage    Alternatives discussed:  Analgesia without sedation  Universal protocol:     Patient identity confirmation method:  Verbally with patient  Indications:     Sedation purpose:  Dislocation reduction    Procedure necessitating sedation performed by:  Physician performing sedation    Intended level of sedation:  Moderate (conscious sedation)  Pre-sedation assessment:     Time since last food or drink:  Breakfast, in the morning, water with pill    ASA classification: class 1 - normal, healthy patient      Neck mobility: normal      Mouth opening:  3 or more finger widths    Mallampati score:  I - soft palate, uvula, fauces, pillars visible    Pre-sedation assessments completed and reviewed: airway patency, cardiovascular function, hydration status, mental status and respiratory function      Pre-sedation assessments completed and reviewed: nausea/vomiting not reviewed      History of difficult intubation: no    Procedural Sedation    Date/Time: 10/30/2022 2:26 PM  Performed by: Anastasia Alex DO  Authorized by: Anastasia Alex DO     Immediate pre-procedure details:     Reassessment: Patient reassessed immediately prior to procedure      Reviewed: vital signs Verified: bag valve mask available, emergency equipment available, intubation equipment available, IV patency confirmed, oxygen available and suction available    Procedure details (see MAR for exact dosages):     Preoxygenation:  Nasal cannula    Intra-procedure events: none      Total sedation time (minutes):  10  Post-procedure details:     Attendance: Constant attendance by certified staff until patient recovered      Recovery: Patient returned to pre-procedure baseline      Post-sedation assessments completed and reviewed: airway patency, cardiovascular function, mental status and respiratory function      Post-sedation assessments completed and reviewed: post-procedure nausea and vomiting status not reviewed and pain score not reviewed      Patient is stable for discharge or admission: yes      Patient tolerance: Tolerated well, no immediate complications  Orthopedic injury treatment    Date/Time: 10/30/2022 2:26 PM  Performed by: Anastasia Estrlela DO  Authorized by: Anastasia Estrella DO   Universal Protocol:  Consent: Verbal consent obtained  Injury location:  Ankle  Location details:  Left ankle  Injury type:  Fracture  Neurovascular status: Neurovascularly intact    Distal perfusion: normal    Neurological function: normal    Range of motion: reduced    Sedation type:   Moderate (conscious) sedation (See separate Procedural Sedation form)  Manipulation performed?: Yes    Skin traction used?: Yes    Skeletal traction used?: Yes    Reduction successful?: Yes    Confirmation: Reduction confirmed by x-ray    Immobilization:  Splint  Splint type:  Short leg  Supplies used:  Ortho-Glass (webril)  Neurovascular status: Neurovascularly intact    Distal perfusion: normal    Neurological function: normal    Range of motion: normal    Patient tolerance:  Patient tolerated the procedure well with no immediate complications             ED Course  ED Course as of 10/30/22 1533   [place] Oct 30, 2022   [address] [place] [address] [address] Evalina Qualia available at [address]   [phone] Patient where of hospitalization, remains comfortable, notes no left lower extremity pain, neurovascular intact distally                               SBIRT 20yo+    Flowsheet Row Most Recent Value   SBIRT (25 yo +)    In order to provide better care to our patients, we are screening all of our patients for alcohol and drug use  Would it be okay to ask you these screening questions? No Filed at: 10/30/2022 1338                    MDM    Disposition  Final diagnoses:   Closed fracture dislocation of left ankle, initial encounter     Time reflects when diagnosis was documented in both MDM as applicable and the Disposition within this note     Time User Action Codes Description Comment    10/30/2022  2:47 PM Ulices Anderson Add [K86 [id number]] Closed fracture dislocation of left ankle, initial encounter       ED Disposition     ED Disposition   Admit    Condition   Stable    Date/Time   Sun Oct 30, 2022  3:27 PM    Comment   Case was discussed with Evie Engel and the patient's admission status was agreed to be Admission Status: observation status to the service of Dr Evie Engel  Follow-up Information    None         Patient's Medications   Discharge Prescriptions    OXYCODONE (ROXICODONE) 5 IMMEDIATE RELEASE TABLET    Take 1 tablet (5 mg total) by mouth every 6 (six) hours as needed for moderate pain for up to 6 doses Max Daily Amount: 20 mg       Start Date: 10/30/2022End Date: --       Order Dose: 5 mg       Quantity: 6 tablet    Refills: 0       No discharge procedures on file      PDMP Review     None          ED Provider  Electronically Signed by           Zac Castro DO  10/30/22 5719

## 2022-10-30 NOTE — ASSESSMENT & PLAN NOTE
· Pain, swelling inability to walk follow-up fall  · X-ray with dislocation and closed fracture left ankle (official read not available)  · ED left ankle reduced and splinted  · Pain control  · Orthopedic consult  · Neurovascular checks

## 2022-10-31 ENCOUNTER — APPOINTMENT (INPATIENT)
Dept: RADIOLOGY | Facility: HOSPITAL | Age: 72
End: 2022-10-31

## 2022-10-31 ENCOUNTER — ANESTHESIA (INPATIENT)
Dept: PERIOP | Facility: HOSPITAL | Age: 72
End: 2022-10-31

## 2022-10-31 ENCOUNTER — ANESTHESIA EVENT (INPATIENT)
Dept: PERIOP | Facility: HOSPITAL | Age: 72
End: 2022-10-31

## 2022-10-31 LAB
AMORPH URATE CRY URNS QL MICRO: ABNORMAL /HPF
ANION GAP SERPL CALCULATED.3IONS-SCNC: 7 MMOL/L (ref 4–13)
BACTERIA UR QL AUTO: ABNORMAL /HPF
BILIRUB UR QL STRIP: NEGATIVE
BUN SERPL-MCNC: 20 MG/DL (ref 5–25)
CALCIUM SERPL-MCNC: 8.1 MG/DL (ref 8.3–10.1)
CHLORIDE SERPL-SCNC: 103 MMOL/L (ref 96–108)
CLARITY UR: ABNORMAL
CO2 SERPL-SCNC: 27 MMOL/L (ref 21–32)
COLOR UR: YELLOW
CREAT SERPL-MCNC: 0.9 MG/DL (ref 0.6–1.3)
EST. AVERAGE GLUCOSE BLD GHB EST-MCNC: 123 MG/DL
GFR SERPL CREATININE-BSD FRML MDRD: 64 ML/MIN/1.73SQ M
GLUCOSE P FAST SERPL-MCNC: 116 MG/DL (ref 65–99)
GLUCOSE SERPL-MCNC: 116 MG/DL (ref 65–140)
GLUCOSE UR STRIP-MCNC: NEGATIVE MG/DL
HBA1C MFR BLD: 5.9 %
HGB UR QL STRIP.AUTO: NEGATIVE
KETONES UR STRIP-MCNC: ABNORMAL MG/DL
LEUKOCYTE ESTERASE UR QL STRIP: ABNORMAL
NITRITE UR QL STRIP: NEGATIVE
NON-SQ EPI CELLS URNS QL MICRO: ABNORMAL /HPF
PH UR STRIP.AUTO: 6.5 [PH]
POTASSIUM SERPL-SCNC: 3.9 MMOL/L (ref 3.5–5.3)
PROT UR STRIP-MCNC: NEGATIVE MG/DL
RBC #/AREA URNS AUTO: ABNORMAL /HPF
SODIUM SERPL-SCNC: 137 MMOL/L (ref 135–147)
SP GR UR STRIP.AUTO: 1.01 (ref 1–1.03)
UROBILINOGEN UR QL STRIP.AUTO: 0.2 E.U./DL
WBC #/AREA URNS AUTO: ABNORMAL /HPF

## 2022-10-31 PROCEDURE — 0QSK04Z REPOSITION LEFT FIBULA WITH INTERNAL FIXATION DEVICE, OPEN APPROACH: ICD-10-PCS | Performed by: ORTHOPAEDIC SURGERY

## 2022-10-31 DEVICE — 3.5MM CORTEX SCREW SELF-TAPPING 22MM: Type: IMPLANTABLE DEVICE | Site: ANKLE | Status: FUNCTIONAL

## 2022-10-31 DEVICE — 2.7MM LOCKING SCREW SLF-TPNG WITH T8 STARDRIVE RECESS 16MM: Type: IMPLANTABLE DEVICE | Site: ANKLE | Status: FUNCTIONAL

## 2022-10-31 DEVICE — 2.7MM LOCKING SCREW SLF-TPNG WITH T8 STARDRIVE RECESS 14MM: Type: IMPLANTABLE DEVICE | Site: ANKLE | Status: FUNCTIONAL

## 2022-10-31 DEVICE — 2.7MM LOCKING SCREW SLF-TPNG WITH T8 STARDRIVE RECESS 18MM: Type: IMPLANTABLE DEVICE | Site: ANKLE | Status: FUNCTIONAL

## 2022-10-31 DEVICE — 3.5MM CORTEX SCREW SELF-TAPPING 12MM: Type: IMPLANTABLE DEVICE | Site: ANKLE | Status: FUNCTIONAL

## 2022-10-31 DEVICE — 2.7MM/3.5MM LCP LATERAL DISTAL FIBULA PLATE 5H/LEFT/99MM
Type: IMPLANTABLE DEVICE | Site: ANKLE | Status: FUNCTIONAL
Brand: LCP

## 2022-10-31 DEVICE — 3.5MM CORTEX SCREW SELF-TAPPING 14MM: Type: IMPLANTABLE DEVICE | Site: ANKLE | Status: FUNCTIONAL

## 2022-10-31 RX ORDER — ACETAMINOPHEN 325 MG/1
975 TABLET ORAL EVERY 8 HOURS SCHEDULED
Status: DISCONTINUED | OUTPATIENT
Start: 2022-10-31 | End: 2022-11-04 | Stop reason: HOSPADM

## 2022-10-31 RX ORDER — CEFAZOLIN SODIUM 2 G/50ML
SOLUTION INTRAVENOUS AS NEEDED
Status: DISCONTINUED | OUTPATIENT
Start: 2022-10-31 | End: 2022-10-31

## 2022-10-31 RX ORDER — SODIUM CHLORIDE, SODIUM LACTATE, POTASSIUM CHLORIDE, CALCIUM CHLORIDE 600; 310; 30; 20 MG/100ML; MG/100ML; MG/100ML; MG/100ML
125 INJECTION, SOLUTION INTRAVENOUS CONTINUOUS
Status: DISCONTINUED | OUTPATIENT
Start: 2022-10-31 | End: 2022-11-04

## 2022-10-31 RX ORDER — PROPOFOL 10 MG/ML
INJECTION, EMULSION INTRAVENOUS AS NEEDED
Status: DISCONTINUED | OUTPATIENT
Start: 2022-10-31 | End: 2022-10-31

## 2022-10-31 RX ORDER — CEFAZOLIN SODIUM 2 G/50ML
2000 SOLUTION INTRAVENOUS ONCE
Status: COMPLETED | OUTPATIENT
Start: 2022-10-31 | End: 2022-10-31

## 2022-10-31 RX ORDER — CEFAZOLIN SODIUM 2 G/50ML
2000 SOLUTION INTRAVENOUS EVERY 8 HOURS
Status: COMPLETED | OUTPATIENT
Start: 2022-11-01 | End: 2022-11-01

## 2022-10-31 RX ORDER — SODIUM CHLORIDE, SODIUM LACTATE, POTASSIUM CHLORIDE, CALCIUM CHLORIDE 600; 310; 30; 20 MG/100ML; MG/100ML; MG/100ML; MG/100ML
INJECTION, SOLUTION INTRAVENOUS CONTINUOUS PRN
Status: DISCONTINUED | OUTPATIENT
Start: 2022-10-31 | End: 2022-10-31

## 2022-10-31 RX ORDER — HYDROMORPHONE HCL/PF 1 MG/ML
0.5 SYRINGE (ML) INJECTION
Status: DISCONTINUED | OUTPATIENT
Start: 2022-10-31 | End: 2022-10-31 | Stop reason: HOSPADM

## 2022-10-31 RX ORDER — ONDANSETRON 2 MG/ML
INJECTION INTRAMUSCULAR; INTRAVENOUS AS NEEDED
Status: DISCONTINUED | OUTPATIENT
Start: 2022-10-31 | End: 2022-10-31

## 2022-10-31 RX ORDER — ENOXAPARIN SODIUM 100 MG/ML
40 INJECTION SUBCUTANEOUS DAILY
Status: DISCONTINUED | OUTPATIENT
Start: 2022-11-01 | End: 2022-11-04 | Stop reason: HOSPADM

## 2022-10-31 RX ORDER — DEXMEDETOMIDINE HYDROCHLORIDE 100 UG/ML
INJECTION, SOLUTION INTRAVENOUS AS NEEDED
Status: DISCONTINUED | OUTPATIENT
Start: 2022-10-31 | End: 2022-10-31

## 2022-10-31 RX ORDER — ONDANSETRON 2 MG/ML
4 INJECTION INTRAMUSCULAR; INTRAVENOUS ONCE AS NEEDED
Status: DISCONTINUED | OUTPATIENT
Start: 2022-10-31 | End: 2022-10-31 | Stop reason: HOSPADM

## 2022-10-31 RX ORDER — MIDAZOLAM HYDROCHLORIDE 2 MG/2ML
INJECTION, SOLUTION INTRAMUSCULAR; INTRAVENOUS AS NEEDED
Status: DISCONTINUED | OUTPATIENT
Start: 2022-10-31 | End: 2022-10-31

## 2022-10-31 RX ORDER — HYDROMORPHONE HCL/PF 1 MG/ML
SYRINGE (ML) INJECTION AS NEEDED
Status: DISCONTINUED | OUTPATIENT
Start: 2022-10-31 | End: 2022-10-31

## 2022-10-31 RX ORDER — FENTANYL CITRATE 50 UG/ML
INJECTION, SOLUTION INTRAMUSCULAR; INTRAVENOUS AS NEEDED
Status: DISCONTINUED | OUTPATIENT
Start: 2022-10-31 | End: 2022-10-31

## 2022-10-31 RX ORDER — MAGNESIUM HYDROXIDE 1200 MG/15ML
LIQUID ORAL AS NEEDED
Status: DISCONTINUED | OUTPATIENT
Start: 2022-10-31 | End: 2022-10-31 | Stop reason: HOSPADM

## 2022-10-31 RX ORDER — FENTANYL CITRATE/PF 50 MCG/ML
50 SYRINGE (ML) INJECTION
Status: DISCONTINUED | OUTPATIENT
Start: 2022-10-31 | End: 2022-10-31 | Stop reason: HOSPADM

## 2022-10-31 RX ORDER — LIDOCAINE HYDROCHLORIDE 10 MG/ML
INJECTION, SOLUTION EPIDURAL; INFILTRATION; INTRACAUDAL; PERINEURAL AS NEEDED
Status: DISCONTINUED | OUTPATIENT
Start: 2022-10-31 | End: 2022-10-31

## 2022-10-31 RX ORDER — KETOROLAC TROMETHAMINE 30 MG/ML
INJECTION, SOLUTION INTRAMUSCULAR; INTRAVENOUS AS NEEDED
Status: DISCONTINUED | OUTPATIENT
Start: 2022-10-31 | End: 2022-10-31

## 2022-10-31 RX ORDER — DEXAMETHASONE SODIUM PHOSPHATE 10 MG/ML
INJECTION, SOLUTION INTRAMUSCULAR; INTRAVENOUS AS NEEDED
Status: DISCONTINUED | OUTPATIENT
Start: 2022-10-31 | End: 2022-10-31

## 2022-10-31 RX ADMIN — PIMAVANSERIN TARTRATE 34 MG: 34 CAPSULE ORAL at 21:57

## 2022-10-31 RX ADMIN — DEXAMETHASONE SODIUM PHOSPHATE 10 MG: 10 INJECTION, SOLUTION INTRAMUSCULAR; INTRAVENOUS at 17:27

## 2022-10-31 RX ADMIN — SODIUM CHLORIDE, SODIUM LACTATE, POTASSIUM CHLORIDE, AND CALCIUM CHLORIDE: .6; .31; .03; .02 INJECTION, SOLUTION INTRAVENOUS at 18:03

## 2022-10-31 RX ADMIN — OXYCODONE HYDROCHLORIDE 5 MG: 5 TABLET ORAL at 04:10

## 2022-10-31 RX ADMIN — LEVOTHYROXINE SODIUM 50 MCG: 50 TABLET ORAL at 05:38

## 2022-10-31 RX ADMIN — SENNOSIDES, DOCUSATE SODIUM 1 TABLET: 8.6; 5 TABLET ORAL at 10:24

## 2022-10-31 RX ADMIN — SODIUM CHLORIDE, SODIUM LACTATE, POTASSIUM CHLORIDE, AND CALCIUM CHLORIDE 125 ML/HR: .6; .31; .03; .02 INJECTION, SOLUTION INTRAVENOUS at 21:56

## 2022-10-31 RX ADMIN — HYDROMORPHONE HYDROCHLORIDE 0.5 MG: 1 INJECTION, SOLUTION INTRAMUSCULAR; INTRAVENOUS; SUBCUTANEOUS at 17:50

## 2022-10-31 RX ADMIN — KETOROLAC TROMETHAMINE 15 MG: 30 INJECTION, SOLUTION INTRAMUSCULAR at 18:24

## 2022-10-31 RX ADMIN — CARBIDOPA AND LEVODOPA 1 TABLET: 25; 100 TABLET ORAL at 21:56

## 2022-10-31 RX ADMIN — ACETAMINOPHEN 975 MG: 325 TABLET ORAL at 21:56

## 2022-10-31 RX ADMIN — DEXMEDETOMIDINE HCL 8 MCG: 100 INJECTION INTRAVENOUS at 17:22

## 2022-10-31 RX ADMIN — CARBIDOPA AND LEVODOPA 1 TABLET: 25; 100 TABLET ORAL at 10:24

## 2022-10-31 RX ADMIN — PANTOPRAZOLE SODIUM 20 MG: 20 TABLET, DELAYED RELEASE ORAL at 05:38

## 2022-10-31 RX ADMIN — PROPOFOL 50 MG: 10 INJECTION, EMULSION INTRAVENOUS at 18:30

## 2022-10-31 RX ADMIN — DEXMEDETOMIDINE HCL 8 MCG: 100 INJECTION INTRAVENOUS at 18:35

## 2022-10-31 RX ADMIN — MIDAZOLAM 2 MG: 1 INJECTION INTRAMUSCULAR; INTRAVENOUS at 17:22

## 2022-10-31 RX ADMIN — PROPOFOL 100 MG: 10 INJECTION, EMULSION INTRAVENOUS at 17:27

## 2022-10-31 RX ADMIN — EZETIMIBE 10 MG: 10 TABLET ORAL at 21:56

## 2022-10-31 RX ADMIN — FENTANYL CITRATE 50 MCG: 50 INJECTION INTRAMUSCULAR; INTRAVENOUS at 17:42

## 2022-10-31 RX ADMIN — ASPIRIN 81 MG CHEWABLE TABLET 81 MG: 81 TABLET CHEWABLE at 10:23

## 2022-10-31 RX ADMIN — METOPROLOL TARTRATE 25 MG: 25 TABLET, FILM COATED ORAL at 10:24

## 2022-10-31 RX ADMIN — FENTANYL CITRATE 50 MCG: 50 INJECTION INTRAMUSCULAR; INTRAVENOUS at 17:27

## 2022-10-31 RX ADMIN — PROPOFOL 50 MG: 10 INJECTION, EMULSION INTRAVENOUS at 17:28

## 2022-10-31 RX ADMIN — CEFAZOLIN SODIUM 2000 MG: 2 SOLUTION INTRAVENOUS at 17:22

## 2022-10-31 RX ADMIN — CEFAZOLIN SODIUM 2000 MG: 2 SOLUTION INTRAVENOUS at 16:57

## 2022-10-31 RX ADMIN — DEXMEDETOMIDINE HCL 12 MCG: 100 INJECTION INTRAVENOUS at 17:27

## 2022-10-31 RX ADMIN — ENOXAPARIN SODIUM 40 MG: 40 INJECTION SUBCUTANEOUS at 10:23

## 2022-10-31 RX ADMIN — Medication 1000 UNITS: at 10:24

## 2022-10-31 RX ADMIN — ONDANSETRON 4 MG: 2 INJECTION INTRAMUSCULAR; INTRAVENOUS at 17:48

## 2022-10-31 RX ADMIN — SODIUM CHLORIDE, SODIUM LACTATE, POTASSIUM CHLORIDE, AND CALCIUM CHLORIDE: .6; .31; .03; .02 INJECTION, SOLUTION INTRAVENOUS at 17:13

## 2022-10-31 NOTE — CONSULTS
Consultation - Orthopedics   Jean Marie Burris 70 y o  female MRN: [id number]  Unit/Bed#: -01 Encounter: [id number]      Assessment/Plan     Assessment:  Bimalleolar fracture subluxation left ankle; ambulatory dysfunction; thyroid disease; Parkinson's disease  Plan:  The risks, benefits, options and alternatives of treatment were discussed, including but not limited to: Infection, blood loss, DVT/PE, poor wound healing, anesthetic risks, loss of motion, nerve/blood vessel damage, poor fracture healing, fixation failure  After a thorough discussion, the plan will be to proceed with open reduction internal fixation of this left ankle fracture today  If the patient is not medically cleared, surgery will be postponed  The patient mentioned significant swelling and she was informed that if there is blistering of the skin at the time of the attempted surgery, then surgery would need to be postponed to allow for healing of the skin  She did not want me to remove the splint at her bedside I think this is reasonable as this would likely lead to redisplacement of the fracture  History of Present Illness   Physician Requesting Consult: Satinder Sainz MD  Reason for Consult / Principal Problem:  Fracture left ankle  HPI: Jean Marie Burris is a 70y o  year old female who presents with injury to her left ankle when she missed 2 steps while at her [employer] on 10/30/2022 suffering an inversion injury to the ankle  She was unable to ambulate, was brought by ambulance to the emergency room at [facility], x-rays obtained and she underwent reduction of her ankle subluxation  Splint applied and demonstrated good alignment of the fracture  She was admitted to the medical service and orthopedic consultation has been requested  She denies any paresthesias    She denies any history of prior injuries to her left ankle, denies any additional injuries and denies any chest pain, shortness of breath, lightheadedness or dizziness  Consults    Review of Systems:  The patient is 10 organ system review is negative excluding as is indicated in the history of chief complaint and as is consistent with her past medical history  Historical Information   Past Medical History:   Diagnosis Date   • HLD (hyperlipidemia)    • Hypothyroid    • Parkinson disease ([address] )      History reviewed  No pertinent surgical history  Social History   Social History     Substance and Sexual Activity   Alcohol Use Not Currently     Social History     Substance and Sexual Activity   Drug Use Never     E-Cigarette/Vaping   • E-Cigarette Use Never User      E-Cigarette/Vaping Substances     Social History     Tobacco Use   Smoking Status Never Smoker   Smokeless Tobacco Never Used     Family History: non-contributory    Meds/Allergies   all current active meds have been reviewed  Allergies   Allergen Reactions   • Lidocaine Anaphylaxis   • Mushroom Extract Complex - Food Allergy Anaphylaxis and Throat Swelling   • Butamben-Tetracaine-Benzocaine Edema   • Codeine GI Intolerance     Upset stomach     • Sertraline Itching     Reaction unknown     • Statins Myalgia       Objective   Vitals: Blood pressure 153/96, pulse 74, temperature 98 6 °F (37 °C), resp  rate 16, height 5' 3" (1 6 m), weight 98 1 kg (216 lb 4 3 oz), SpO2 97 %  ,Body mass index is 38 31 kg/m²  No intake or output data in the 24 hours ending 10/31/22 0803  No intake/output data recorded  Invasive Devices  Report    Peripheral Intravenous Line  Duration           Peripheral IV 10/30/22 Right Antecubital <1 day                Physical Exam:  Yassine Conrad is alert, oriented and seems to be in no acute distress  HEENT exam is benign   Heart regular, pulses palpable  Lungs clear   Abdomen soft and nontender   Clavicles and ribs nontender   Motor and sensory exam is grossly intact except as limited by her injury  Ortho exam:  The left lower extremity is splinted    The toes are visible and have good color and capillary refill and she is able to move her toes  She denies any tenderness proximal to the splint  The remainder of the left lower extremity exam is benign  The right lower extremity exam and bilateral upper extremity examinations are benign  Lab Results:   CBC:   Lab Results   Component Value Date    WBC 12 98 (H) 10/30/2022    HGB 12 1 10/30/2022    HCT 39 5 10/30/2022    MCV 95 10/30/2022     10/30/2022    MCH 29 0 10/30/2022    MCHC 30 6 (L) 10/30/2022    RDW 12 4 10/30/2022    MPV 10 1 10/30/2022     CMP:   Lab Results   Component Value Date    SODIUM 137 10/31/2022     10/31/2022    CO2 27 10/31/2022    BUN 20 10/31/2022    CREATININE 0 90 10/31/2022    CALCIUM 8 1 (L) 10/31/2022    EGFR 64 10/31/2022     Imaging Studies: Original x-rays of the ankle demonstrated a bimalleolar fracture subluxation with relatively small medial malleolar fracture  Postreduction x-rays demonstrate the subluxation to be reduced and the fracture to be better aligned  Once again, there is a small medial malleolar fragment  EKG, Pathology, and Other Studies: I have personally reviewed pertinent reports

## 2022-10-31 NOTE — PROGRESS NOTES
[address]  Progress Note Josiane Swift 1950, 70 y o  female MRN: [id number]  Unit/Bed#: -Ceci Encounter: [id number]  Primary Care Provider: Caren Dobbins DO   Date and time admitted to hospital: 10/30/2022  1:29 PM    * Closed fracture dislocation of left ankle  Assessment & Plan  · Pain, swelling inability to walk follow-up fall  · X-ray with dislocation and closed fracture left ankle (official read not available)  · ED left ankle reduced and splinted  · Pain control  · Orthopedic consult  · Bimalleolar fracture subluxation left ankle   · Planning on potential surgery later today   · Patient is medically optimized for procedure   · Pre-op EKG with normal sinus rhythm, LVH and no changes from previous EKG  · Does have swelling around ankle - if there is blistering of skin procedure will be delayed - patient and orthopedics agree to not remove splint until later today just prior to surgery   · Neurovascular checks    Hypertensive urgency  Assessment & Plan  · Noted intermittent 180s-190s in ED and on arrival to unit  · PTA Metoprolol tartrate 25 mg daily  · PRN pain control  · Prn lopressor 5mg    Much better controlled now due to adequate pain control   Optimized for procedure       Parkinson's disease ([facility] [address] )  Assessment & Plan  · Continue PTA Sinemet    Fall down steps  Assessment & Plan  · Presents with left ankle pain and swelling after fall down 2 cement steps twisting left ankle, unable to walk after    Not on anticoagulation  · X-ray with closed fracture and dislocation of left ankle  · Ortho consult  · PT/OT consult - likely after surgery       Gastro-esophageal reflux disease without esophagitis  Assessment & Plan  · PTA omeprazole    Acquired hypothyroidism  Assessment & Plan  · Continue PTA levothyroxine  · reports frequent dry mouth and voiding  · TSH wnl   · HgbA1C pending       VTE Pharmacologic Prophylaxis: VTE Score: 9 High Risk (Score >/= 5) - Pharmacological DVT Prophylaxis Ordered: enoxaparin (Lovenox)  Sequential Compression Devices Ordered  Patient Centered Rounds: I performed bedside rounds with nursing staff today  Discussions with Specialists or Other Care Team Provider: CM, ortho    Education and Discussions with Family / Patient: Updated  () via phone  Time Spent for Care: 30 minutes  More than 50% of total time spent on counseling and coordination of care as described above  Current Length of Stay: 0 day(s)  Current Patient Status: Observation   Certification Statement: The patient will continue to require additional inpatient hospital stay due to left ankle fracture   Discharge Plan: Anticipate discharge in 24-48 hrs to discharge location to be determined pending rehab evaluations  Code Status: Level 3 - DNAR and DNI    Subjective:   Seen and examined  Feels pain is much better, ankle only hurts when moving  Is oriented x 2 and alert  Feeling well without acute concerns  Questions about surgery planned for later today discussed with Dr Karnia Verma  Educated patient on incentive spirometry  Objective:     Vitals:   Temp (24hrs), Av °F (36 7 °C), Min:97 3 °F (36 3 °C), Max:98 6 °F (37 °C)    Temp:  [97 3 °F (36 3 °C)-98 6 °F (37 °C)] 98 6 °F (37 °C)  HR:  [50-80] 74  Resp:  [12-20] 16  BP: (126-193)/(61-96) 153/96  SpO2:  [94 %-100 %] 97 %  Body mass index is 38 31 kg/m²  Input and Output Summary (last 24 hours):   No intake or output data in the 24 hours ending 10/31/22 0939    Physical Exam:   Physical Exam  Vitals and nursing note reviewed  Constitutional:       General: She is not in acute distress  Appearance: Normal appearance  She is obese  HENT:      Head: Normocephalic and atraumatic  Nose: No congestion        Mouth/Throat:      Mouth: Mucous membranes are moist    Eyes:      Conjunctiva/sclera: Conjunctivae normal    Cardiovascular:      Rate and Rhythm: Normal rate and regular rhythm  Pulses: Normal pulses  Heart sounds: Normal heart sounds  No murmur heard  Pulmonary:      Effort: Pulmonary effort is normal  No respiratory distress  Breath sounds: Normal breath sounds  Abdominal:      General: Bowel sounds are normal       Palpations: Abdomen is soft  Tenderness: There is no abdominal tenderness  Musculoskeletal:         General: Normal range of motion  Right lower leg: No edema  Left lower leg: Edema (difficult to fully examine due to splint, does have significant swelling ) present  Skin:     General: Skin is warm and dry  Neurological:      Mental Status: She is alert and oriented to person, place, and time            Additional Data:     Labs:  Results from last 7 days   Lab Units 10/30/22  1829   WBC Thousand/uL 12 98*   HEMOGLOBIN g/dL 12 1   HEMATOCRIT % 39 5   PLATELETS Thousands/uL 283     Results from last 7 days   Lab Units 10/31/22  0529   SODIUM mmol/L 137   POTASSIUM mmol/L 3 9   CHLORIDE mmol/L 103   CO2 mmol/L 27   BUN mg/dL 20   CREATININE mg/dL 0 90   ANION GAP mmol/L 7   CALCIUM mg/dL 8 1*   GLUCOSE RANDOM mg/dL 116             Results from last 7 days   Lab Units 10/30/22  1829   HEMOGLOBIN A1C % 5 9*           Lines/Drains:  Invasive Devices  Report    Peripheral Intravenous Line  Duration           Peripheral IV 10/30/22 Right Antecubital <1 day                      Imaging: Reviewed radiology reports from this admission including: xray(s)    Recent Cultures (last 7 days):         Last 24 Hours Medication List:   Current Facility-Administered Medications   Medication Dose Route Frequency Provider Last Rate   • acetaminophen  650 mg Oral Q6H PRN BOLIVAR Horowitz     • aspirin  81 mg Oral Daily BOLIVAR Horowitz     • carbidopa-levodopa  1 tablet Oral TID BOLIVAR Horowitz     • cholecalciferol  1,000 Units Oral Daily BOLIVAR Horowitz     • enoxaparin  40 mg Subcutaneous Daily BOLIVAR Horowitz     • ezetimibe  10 mg Oral HS McDonough Life, CRNP     • levothyroxine  50 mcg Oral Early Morning McDonough Life, CRNP     • metoprolol  5 mg Intravenous Q8H PRN McDonough Life, CRNP     • metoprolol tartrate  25 mg Oral Daily McDonough Life, CRNP     • ondansetron  4 mg Intravenous Q6H PRN McDonough Life, CRNP     • oxyCODONE  10 mg Oral Q4H PRN McDonough Life, CRNP     • oxyCODONE  5 mg Oral Q4H PRN McDonough Life, CRNP     • pantoprazole  20 mg Oral Early Morning McDonough Life, CRNP     • Pimavanserin Tartrate  34 mg Oral HS McDonough Life, CRNP     • polyethylene glycol  17 g Oral Daily PRN Louie Barragan MD     • pravastatin  20 mg Oral Daily With Dinner McDonough Life, CRNP     • senna-docusate sodium  1 tablet Oral BID Louie Barragan MD          Today, Patient Was Seen By: Maite Rogers    **Please Note: This note may have been constructed using a voice recognition system  **

## 2022-10-31 NOTE — UTILIZATION REVIEW
Initial Clinical Review    Admission: Date/Time/Statement:   Admission Orders (From admission, onward)     Ordered        10/31/22 1231  Inpatient Admission  Once            10/30/22 1527  Place in Observation  Once                      Orders Placed This Encounter   Procedures   • Inpatient Admission     Standing Status:   Standing     Number of Occurrences:   1     Order Specific Question:   Level of Care     Answer:   Med Surg [16]     Order Specific Question:   Estimated length of stay     Answer:   More than 2 Midnights     Order Specific Question:   Certification     Answer:   I certify that inpatient services are medically necessary for this patient for a duration of greater than two midnights  See H&P and MD Progress Notes for additional information about the patient's course of treatment  ED Arrival Information     Expected   10/30/2022 13:29    Arrival   10/30/2022 13:29    Acuity   Urgent            Means of arrival   Ambulance    Escorted by   [facility]    Service   Hospitalist    Admission type   Emergency            Arrival complaint   fall           Chief Complaint   Patient presents with   • Fall     Pt presented to this ED via EMS c/o left ankle pain and swelling after missing last 2 cement steps twisting extremity and landing on right arm today at 1230  Denies hitting head/loc/blood thinners  Initial Presentation: 70 y o  female with a PMH of Parkinson's, GERD, hypothyroidism who presents with left ankle pain, swelling, inability to walk following a fall at [employer] down 2 cement steps  Patient states she tried to stand but was unable to, denies head strike, LOC, lightheaded dizziness reports that she missed the last 2 steps and twisted her ankle  Plan: Observation for closed fracture dislocation of left ankle, hypertensive urgency: L ankle reduced and splinted, pain control, Orthopedic consult, neurovascular checks, continue metoprolol, prn lopressor  10/31 observation changed to inpatient  Orthopedic consult: After a thorough discussion, the plan will be to proceed with open reduction internal fixation of this left ankle fracture today  Internal medicine: Bimalleolar fracture subluxation left ankle, plan for potential surgery later today  Does have swelling around ankle - if there is blistering of skin procedure will be delayed - patient and orthopedics agree to not remove splint until later today just prior to surgery  Pt optimized for procedure  Date: 11/1 Day 2:    Internal medicine: POD #1 ORIF left ankle  PT/OT consults pending  Continue neurovascular checks  Orthopedics: NWB to LLE, keep splint clean, dry, and intact  May reinforce splint with abds/ace wrap as needed   PT/OT gait training with NWB restrictions      ED Triage Vitals [10/30/22 1334]   Temperature Pulse Respirations Blood Pressure SpO2   (!) 97 3 °F (36 3 °C) 65 18 (!) 193/74 100 %      Temp Source Heart Rate Source Patient Position - Orthostatic VS BP Location FiO2 (%)   Temporal -- Lying Right arm --      Pain Score       10 - Worst Possible Pain          Wt Readings from Last 1 Encounters:   10/30/22 98 1 kg (216 lb 4 3 oz)     Additional Vital Signs:     Date/Time Temp Pulse Resp BP MAP (mmHg) SpO2 O2 Flow Rate (L/min) O2 Device   11/01/22 07:57:13 98 4 °F (36 9 °C) 76 19 153/91 112 96 % -- --   11/01/22 03:29:45 97 9 °F (36 6 °C) 79 18 137/80 99 96 % -- --   11/01/22 0000 -- 75 18 132/79 -- 95 % -- --   10/31/22 2300 -- 71 17 134/69 -- 95 % -- --   10/31/22 22:03:10 97 9 °F (36 6 °C) 77 17 157/91 113 96 % -- --   10/31/22 19:53:23 97 9 °F (36 6 °C) 73 16 164/88 113 91 % -- --   10/31/22 1920 -- 64 18 124/64 -- 95 % -- None (Room air)   10/31/22 1915 -- 65 18 127/66 -- 94 % -- None (Room air)   10/31/22 1910 -- 64 18 -- -- 96 % -- None (Room air)   10/31/22 1905 -- 65 18 118/66 -- 95 % -- None (Room air)   10/31/22 1900 -- 64 18 118/61 -- 93 % -- None (Room air) 10/31/22 1855 -- 65 18 116/59 -- 96 % -- None (Room air)   10/31/22 1850 98 °F (36 7 °C) 67 18 126/59 -- 99 % 6 L/min Simple mask   10/31/22 1541 98 6 °F (37 °C) 80 18 180/84 Abnormal  -- 96 % -- None (Room air)   10/31/22 1024 -- 79 -- 155/99 -- -- -- --   10/31/22 0900 -- -- -- -- -- -- -- None (Room air)   10/31/22 07:34:01 98 6 °F (37 °C) -- 16 153/96 115 -- -- --   10/30/22 22:01:57 98 2 °F (36 8 °C) 74 20 126/71 89 97 % -- --   10/30/22 1749 -- 74 -- 148/87 -- -- -- --   10/30/22 1745 -- -- -- -- -- -- -- None (Room air)   10/30/22 16:00:12 97 9 °F (36 6 °C) 80 -- 180/89 Abnormal  119 99 % -- --   10/30/22 15:30:59 -- 59 18 140/63 94 98 % -- --   10/30/22 15:16:01 -- 64 18 148/70 100 94 % -- None (Room air)   10/30/22 15:05:59 -- -- -- 138/66 95 -- -- --   10/30/22 15:05:21 -- 51 Abnormal  18 138/66 95 99 % -- None (Room air)   10/30/22 1503 -- 54 Abnormal  13 -- -- 100 % -- --   10/30/22 15:01:01 -- -- -- 142/70 99 -- -- --   10/30/22 1500 -- 56 14 142/70 -- 99 % -- None (Room air)   10/30/22 1457 -- 50 Abnormal  14 -- -- 100 % -- --   10/30/22 14:55:58 -- -- -- 146/70 101 -- -- --   10/30/22 14:55:53 -- 51 Abnormal  18 146/70 101 100 % 2 L/min EtCO2 nasal cannula   10/30/22 1454 -- 52 Abnormal  12 -- -- 100 % -- --   10/30/22 1452 -- -- -- 158/76 109 -- -- --   10/30/22 1451 -- 56 15 -- -- 100 % -- --   10/30/22 1450 -- 53 Abnormal  18 158/76 109 100 % 2 L/min EtCO2 nasal cannula   10/30/22 1448 -- 60 16 -- -- 100 % -- --   10/30/22 1446 -- -- -- 152/61 97 -- -- --   10/30/22 1445 -- 60 15 152/61 -- 100 % 4 L/min EtCO2 nasal cannula   10/30/22 1442 -- 65 18 -- -- 100 % -- --   10/30/22 14:41:59 -- 64 18 153/69 102 100 % 4 L/min EtCO2 nasal cannula   10/30/22 14:39:58 -- -- -- 166/73 105 -- -- --   10/30/22 1439 -- 78 16 162/80 -- 100 % 4 L/min EtCO2 nasal cannula   10/30/22 14:37:59 -- 77 18 186/84 Abnormal  124 100 % 4 L/min EtCO2 nasal cannula   10/30/22 1430 -- 68 17 168/89 117 100 % -- --   10/30/22 1415 -- 61 18 152/68 98 99 % -- --   10/30/22 1400 -- 64 18 152/68 98 99 % -- --   10/30/22 1345 -- 66 18 193/74 Abnormal  107 98 % -- --   10/30/22 1341 -- -- -- -- -- -- -- None (Room air)       Pertinent Labs/Diagnostic Test Results:     10/31:  Procedure(s) (LRB):  OPEN REDUCTION W/ INTERNAL FIXATION (ORIF) ANKLE (Left)    XR ankle 3+ views LEFT   Final Result by Phyllis Damian MD (10/31 8146)      Interval reduction of tibiotalar joint dislocation  Again seen are acute posterior and lateral malleolar  fractures, and old well-corticated avulsion fracture fragment off the medial malleolus  Workstation performed: [id number]         XR ankle 3+ views LEFT   ED Interpretation by Sam Tang DO (10/30 1419)   Fracture dislocation      Final Result by Inna Giles MD (10/31 8342)      Acute displaced angulated fracture of the distal fibula  Potential fracture of the posterior tibial malleolus  Marked anteromedial displacement of the tibial plateau with respect to the talar dome  Clinical provider aware of the findings        Workstation performed: [id number]               Results from last 7 days   Lab Units 10/30/22  1829   WBC Thousand/uL 12 98*   HEMOGLOBIN g/dL 12 1   HEMATOCRIT % 39 5   PLATELETS Thousands/uL 283         Results from last 7 days   Lab Units 10/31/22  0529 10/30/22  1829   SODIUM mmol/L 137 139   POTASSIUM mmol/L 3 9 4 2   CHLORIDE mmol/L 103 104   CO2 mmol/L 27 29   ANION GAP mmol/L 7 6   BUN mg/dL 20 16   CREATININE mg/dL 0 90 0 91   EGFR ml/min/1 73sq m 64 63   CALCIUM mg/dL 8 1* 8 5             Results from last 7 days   Lab Units 10/31/22  0529 10/30/22  1829   GLUCOSE RANDOM mg/dL 116 196*           Results from last 7 days   Lab Units 10/30/22  1829   TSH 3RD GENERATON uIU/mL 1 745         ED Treatment:   Medication Administration from 10/30/2022 1329 to 10/30/2022 1552       Date/Time Order Dose Route Action     10/30/2022 1339 oxyCODONE (ROXICODONE) IR tablet 5 mg 5 mg Oral Given     10/30/2022 1437 propofol (DIPRIVAN) 200 MG/20ML bolus injection 100 mg 80 mg Intravenous Given        Past Medical History:   Diagnosis Date   • HLD (hyperlipidemia)    • Hypothyroid    • [facility]      Present on Admission:  • Gastro-esophageal reflux disease without esophagitis  • Acquired hypothyroidism  • Closed fracture dislocation of left ankle  • Parkinson's disease ([facility] )      Admitting Diagnosis: Unspecified multiple injuries, initial encounter [T07  XXXA]  Closed fracture dislocation of left ankle, initial encounter [S8 892A]  Age/Sex: 70 y o  female  Admission Orders:  Scheduled Medications:  aspirin, 81 mg, Oral, Daily  carbidopa-levodopa, 1 tablet, Oral, TID  cholecalciferol, 1,000 Units, Oral, Daily  enoxaparin, 40 mg, Subcutaneous, Daily  ezetimibe, 10 mg, Oral, HS  levothyroxine, 50 mcg, Oral, Early Morning  metoprolol tartrate, 25 mg, Oral, Daily  pantoprazole, 20 mg, Oral, Early Morning  Pimavanserin Tartrate, 34 mg, Oral, HS  pravastatin, 20 mg, Oral, Daily With Dinner  senna-docusate sodium, 1 tablet, Oral, BID      Continuous IV Infusions:     PRN Meds:  acetaminophen, 650 mg, Oral, Q6H PRN  metoprolol, 5 mg, Intravenous, Q8H PRN  ondansetron, 4 mg, Intravenous, Q6H PRN  oxyCODONE, 10 mg, Oral, Q4H PRN  oxyCODONE, 5 mg, Oral, Q4H PRN  polyethylene glycol, 17 g, Oral, Daily PRN        IP CONSULT TO ORTHOPEDIC SURGERY    Network Utilization Review Department  ATTENTION: Please call with any questions or concerns to [phone] and carefully listen to the prompts so that you are directed to the right person  All voicemails are confidential   [place] all requests for admission clinical reviews, approved or denied determinations and any other requests to dedicated fax number below belonging to the campus where the patient is receiving treatment   List of dedicated fax numbers for the Facilities:  FACILITY NAME UR FAX NUMBER   ADMISSION DENIALS (Administrative/Medical Necessity) [phone]   [address] (Maternity/NICU/Pediatrics) [address] [place]  [phone]   [address] [place] [id number] [place] [address] [address] [facility] [place] [address] [facility] [phone]

## 2022-10-31 NOTE — ANESTHESIA POSTPROCEDURE EVALUATION
Post-Op Assessment Note    CV Status:  Stable  Pain Score: 0    Pain management: adequate     Mental Status:  Sleepy and arousable   Hydration Status:  Stable   PONV Controlled:  None   Airway Patency:  Patent      Post Op Vitals Reviewed: Yes      Staff: Anesthesiologist         No complications documented      /59 (10/31/22 1850)    Temp 98 °F (36 7 °C) (10/31/22 1850)    Pulse 67 (10/31/22 1850)   Resp 18 (10/31/22 1850)    SpO2 99 % (10/31/22 1850)

## 2022-10-31 NOTE — NURSING NOTE
Sent a message to covering provider for patient regarding patients frequent urination "all day" today  Patient states has been off and on the bed pan  Patient on and off the bedpan in the APU multiple times with small amounts of strong smelling urine  Urine is noted to be kolby in color with sediment noted as well  Reached out to check if providers wanted urine culture sent  Response was "no "  "We aren't going to look for an infection when she is here for something completely different "  No urine sent to the lab at this time

## 2022-10-31 NOTE — OP NOTE
OPERATIVE REPORT  PATIENT NAME: Katelynn Sofia    :  1950  MRN: [id number]  Pt Location: OW OR ROOM 02    SURGERY DATE: 10/31/2022    Surgeon(s) and Role:     * Rubi Westbrook - Primary     * Trinidad Weir PA-C - Assisting    Preop Diagnosis:  Closed fracture dislocation of left ankle, initial encounter [W12 743T]    Post-Op Diagnosis Codes:     * Closed fracture dislocation of left ankle, initial encounter [M26 125N]    Procedure(s) (LRB):  OPEN REDUCTION W/ INTERNAL FIXATION (ORIF) ANKLE (Left)    Tourniquet time:  42 minutes at 300 mmHg    Estimated Blood Loss:   Minimal    Fluids:  1000 cc    Anesthesia Type:   General    Operative Indications:  Closed fracture dislocation of left ankle, initial encounter Lucy dubon is a 49-year-old female who fell injuring her left ankle on 10/30/2022  X-rays demonstrated a fracture subluxation which was reduced in the emergency room  She was seen by Orthopedics, the risk, benefits, options and alternatives of treatment discussed and it was elected to proceed with surgical fixation  Operative Findings: At the time of the procedure, the distal fibular fracture was stably fixated in standard technique with a anterior to posterior interfragmentary compression screw and a distal fibular locking plate  Under fluoroscopic imaging, the medial malleolar fragment seem to be more of an accessory ossicle and excellent ankle alignment was noted after fixation of the distal fibula  Traction on the distal fibula under live fluoroscopic imaging demonstrated no syndesmotic widening and external rotation valgus stress demonstrated no lateral talar tilt  It was determined that there would be no need to open the medial side  Final fluoroscopic images in the AP, lateral and mortise views demonstrated anatomic alignment of the fracture and stable fixation with no evidence of joint space malalignment      Complications:   None    Procedure and Technique:  Patient was taken to the operating room administered a general anesthetic  A well-padded tourniquet was applied to the proximal left lower extremity  The limb was then prepped and draped in standard fashion  A time-out was performed and preoperative antibiotics administered  The limb was then elevated, exsanguinated with an Esmarch bandage and the tourniquet inflated to 300 mmHg  A lateral incision was made, guided by fluoroscopy, over the distal fibula and curved slightly anteriorly at the malleolar level  Sharp dissection was carried down through the skin and subcutaneous tissues  Sural nerve was identified protected and the distal fibula exposed subperiosteally  The fracture was mobilized, cleansed of debris and irrigated  The fracture was then reduced and secured with a bone-holding clamp  An anterior to posterior interfragmentary compression screw was inserted in standard fashion  A 5 hole distal fibular locking plate was then placed,  secured in standard fashion proximally and distally to the fracture  Fluoroscopic images confirmed anatomic alignment and additional screws were placed as needed  Final fluoroscopic images in AP, lateral and mortise views demonstrated excellent fracture alignment and stable fixation  Utilizing a towel clip traction was placed on the distal fibula with live fluoroscopic imaging demonstrating no evidence of syndesmotic widening or medial joint space widening  External rotation valgus stress demonstrated no medial joint space widening or talar tilt  The wound was irrigated with saline solution  Closure was performed in standard fashion utilizing 0 Vicryl for the peroneal fascia, 2 O Vicryl for the subcutaneous tissues and skin glue  The tourniquet was deflated after total tourniquet time of 42 minutes  Dressings were applied consisting of Telfa, gauze and Webril      The limb was immobilized with a U splint and the patient then transferred to recovery room in stable and satisfactory postoperative condition       I was present for the entire procedure, A qualified resident physician was not available and A physician assistant was required during the procedure for retraction tissue handling,dissection and suturing    Patient Disposition:  Critical Care Unit        SIGNATURE: Yesica Jennings  DATE: October 31, 2022  TIME: 6:46 PM

## 2022-10-31 NOTE — PLAN OF CARE
Problem: Potential for Falls  Goal: Patient will remain free of falls  Description: INTERVENTIONS:  - Educate patient/family on patient safety including physical limitations  - Instruct patient to call for assistance with activity   - Consult OT/PT to assist with strengthening/mobility   - Keep Call bell within reach  - Keep bed low and locked with side rails adjusted as appropriate  - Keep care items and personal belongings within reach  - Initiate and maintain comfort rounds  - Make Fall Risk Sign visible to staff  - Offer Toileting every 2 Hours, in advance of need  - Initiate/Maintain bed alarm  - Obtain necessary fall risk management equipment: fall bracelet, non skid socks  - Apply yellow socks and bracelet for high fall risk patients  - Consider moving patient to room near nurses station  Outcome: Progressing     Problem: MOBILITY - ADULT  Goal: Maintain or return to baseline ADL function  Description: INTERVENTIONS:  -  Assess patient's ability to carry out ADLs; assess patient's baseline for ADL function and identify physical deficits which impact ability to perform ADLs (bathing, care of mouth/teeth, toileting, grooming, dressing, etc )  - Assess/evaluate cause of self-care deficits   - Assess range of motion  - Assess patient's mobility; develop plan if impaired  - Assess patient's need for assistive devices and provide as appropriate  - Encourage maximum independence but intervene and supervise when necessary  - Involve family in performance of ADLs  - Assess for home care needs following discharge   - Consider OT consult to assist with ADL evaluation and planning for discharge  - Provide patient education as appropriate  Outcome: Progressing  Goal: Maintains/Returns to pre admission functional level  Description: INTERVENTIONS:  - Perform BMAT or MOVE assessment daily    - Set and communicate daily mobility goal to care team and patient/family/caregiver     - Collaborate with rehabilitation services on mobility goals if consulted  - Perform Range of Motion 3 times a day  - Reposition patient every 2 hours    - Dangle patient 3 times a day  - Stand patient 3 times a day  - Ambulate patient 3 times a day  - Out of bed to chair 3 times a day   - Out of bed for meals 3 times a day  - Out of bed for toileting  - Record patient progress and toleration of activity level   Outcome: Progressing     Problem: PAIN - ADULT  Goal: Verbalizes/displays adequate comfort level or baseline comfort level  Description: Interventions:  - Encourage patient to monitor pain and request assistance  - Assess pain using appropriate pain scale  - Administer analgesics based on type and severity of pain and evaluate response  - Implement non-pharmacological measures as appropriate and evaluate response  - Consider cultural and social influences on pain and pain management  - Notify physician/advanced practitioner if interventions unsuccessful or patient reports new pain  Outcome: Progressing     Problem: SAFETY ADULT  Goal: Patient will remain free of falls  Description: INTERVENTIONS:  - Educate patient/family on patient safety including physical limitations  - Instruct patient to call for assistance with activity   - Consult OT/PT to assist with strengthening/mobility   - Keep Call bell within reach  - Keep bed low and locked with side rails adjusted as appropriate  - Keep care items and personal belongings within reach  - Initiate and maintain comfort rounds  - Make Fall Risk Sign visible to staff  - Offer Toileting every 2 Hours, in advance of need  - Initiate/Maintain bed alarm  - Obtain necessary fall risk management equipment: bed alarm, fall bracelet  - Apply yellow socks and bracelet for high fall risk patients  - Consider moving patient to room near nurses station  Outcome: Progressing  Goal: Maintain or return to baseline ADL function  Description: INTERVENTIONS:  -  Assess patient's ability to carry out ADLs; assess patient's baseline for ADL function and identify physical deficits which impact ability to perform ADLs (bathing, care of mouth/teeth, toileting, grooming, dressing, etc )  - Assess/evaluate cause of self-care deficits   - Assess range of motion  - Assess patient's mobility; develop plan if impaired  - Assess patient's need for assistive devices and provide as appropriate  - Encourage maximum independence but intervene and supervise when necessary  - Involve family in performance of ADLs  - Assess for home care needs following discharge   - Consider OT consult to assist with ADL evaluation and planning for discharge  - Provide patient education as appropriate  Outcome: Progressing  Goal: Maintains/Returns to pre admission functional level  Description: INTERVENTIONS:  - Perform BMAT or MOVE assessment daily    - Set and communicate daily mobility goal to care team and patient/family/caregiver  - Collaborate with rehabilitation services on mobility goals if consulted  - Perform Range of Motion 3 times a day  - Reposition patient every 2 hours    - Dangle patient 3 times a day  - Stand patient 3 times a day  - Ambulate patient 3 times a day  - Out of bed to chair 3 times a day   - Out of bed for meals 3 times a day  - Out of bed for toileting  - Record patient progress and toleration of activity level   Outcome: Progressing     Problem: DISCHARGE PLANNING  Goal: Discharge to home or other facility with appropriate resources  Description: INTERVENTIONS:  - Identify barriers to discharge w/patient and caregiver  - Arrange for needed discharge resources and transportation as appropriate  - Identify discharge learning needs (meds, wound care, etc )  - Arrange for interpretive services to assist at discharge as needed  - Refer to Case Management Department for coordinating discharge planning if the patient needs post-hospital services based on physician/advanced practitioner order or complex needs related to functional status, cognitive ability, or social support system  Outcome: Progressing     Problem: Knowledge Deficit  Goal: Patient/family/caregiver demonstrates understanding of disease process, treatment plan, medications, and discharge instructions  Description: Complete learning assessment and assess knowledge base    Interventions:  - Provide teaching at level of understanding  - Provide teaching via preferred learning methods  Outcome: Progressing     Problem: Prexisting or High Potential for Compromised Skin Integrity  Goal: Skin integrity is maintained or improved  Description: INTERVENTIONS:  - Identify patients at risk for skin breakdown  - Assess and monitor skin integrity  - Assess and monitor nutrition and hydration status  - Monitor labs   - Assess for incontinence   - Turn and reposition patient  - Assist with mobility/ambulation  - Relieve pressure over bony prominences  - Avoid friction and shearing  - Provide appropriate hygiene as needed including keeping skin clean and dry  - Evaluate need for skin moisturizer/barrier cream  - Collaborate with interdisciplinary team   - Patient/family teaching  - Consider wound care consult   Outcome: Progressing

## 2022-10-31 NOTE — DISCHARGE INSTRUCTIONS
Discharge Instructions - Orthopedics  Rayne Aguilar 70 y o  female MRN: [id number]  Unit/Bed#: APU 06    Weight Bearing Status:                                           Non-weight bearing on the left lower extremity    DVT prophylaxis:  Continue your daily ASA 81mg     Pain:  Continue analgesics as directed  A prescription for pain medication was sent to your pharmacy on file  Dressing Instructions:   Keep splint clean, dry and intact until follow up appointment  Do not remove  Do not shower until seen in clinic  Sponge bathe to avoid getting splint wet  Appt Instructions: If you do not have your appointment, please call the clinic at [phone] to f/u with Dr Natasha Cobb in 1 week  Contact the office sooner if you experience any increased numbness/tingling in the extremities

## 2022-10-31 NOTE — ANESTHESIA PREPROCEDURE EVALUATION
Procedure:  OPEN REDUCTION W/ INTERNAL FIXATION (ORIF) ANKLE (Left Ankle)    Relevant Problems   CARDIO   (+) Hypertensive urgency      ENDO   (+) Acquired hypothyroidism      GI/HEPATIC   (+) Gastro-esophageal reflux disease without esophagitis      PULMONARY   (+) Chronic obstructive pulmonary disease (HCC)        Physical Exam    Airway    Mallampati score: III  TM Distance: <3 FB  Neck ROM: full     Dental       Cardiovascular  Cardiovascular exam normal    Pulmonary  Pulmonary exam normal     Other Findings        Anesthesia Plan  ASA Score- 3 Emergent    Anesthesia Type- general with ASA Monitors  Additional Monitors:   Airway Plan: LMA  Plan Factors-Exercise tolerance (METS): >4 METS  Chart reviewed  Existing labs reviewed  Patient summary reviewed  Patient is not a current smoker  Induction- intravenous  Postoperative Plan- Plan for postoperative opioid use  Planned trial extubation    Informed Consent- Anesthetic plan and risks discussed with patient  I personally reviewed this patient with the CRNA  Discussed and agreed on the Anesthesia Plan with the CRNA  Bubba Moreno Pedro is a 70 y o  presenting today for ORIF of left ankle s/p fall  NPO since last night  Discussed risk and benefits of general which include and are not limited to: MI, stroke, sore throat, PONV, post- op pain  No new symptoms of  CP, SOB, F, chills, N/V, dizziness, numbness or tingling, cough, and other symptoms except as noted  AQA  Consented  History of anesthesia:  no personal issues/family issues  Originally planned for regional block and MAC but given her daily dose of lovenox, since deep compartment, recommended to wait 12 hours after lovenox injection  Given this morning at 10 AM  Will forego regional at this time         Speaking with patient notes that she had a urology appointment today but was unable to make it, states she has chronic urinary issues is why she follows with a urologist and states that she does get UTIs  Asked if if she is having urinary issues prior to fall and states that she does  Urine in pre op area suspicious for UTI

## 2022-11-01 PROBLEM — R35.0 FREQUENT URINATION: Status: ACTIVE | Noted: 2022-11-01

## 2022-11-01 LAB
ANION GAP SERPL CALCULATED.3IONS-SCNC: 6 MMOL/L (ref 4–13)
ATRIAL RATE: 75 BPM
BUN SERPL-MCNC: 14 MG/DL (ref 5–25)
CALCIUM SERPL-MCNC: 7.7 MG/DL (ref 8.3–10.1)
CHLORIDE SERPL-SCNC: 100 MMOL/L (ref 96–108)
CO2 SERPL-SCNC: 29 MMOL/L (ref 21–32)
CREAT SERPL-MCNC: 1 MG/DL (ref 0.6–1.3)
ERYTHROCYTE [DISTWIDTH] IN BLOOD BY AUTOMATED COUNT: 12.1 % (ref 11.6–15.1)
GFR SERPL CREATININE-BSD FRML MDRD: 56 ML/MIN/1.73SQ M
GLUCOSE SERPL-MCNC: 165 MG/DL (ref 65–140)
HCT VFR BLD AUTO: 36 % (ref 34.8–46.1)
HGB BLD-MCNC: 11.4 G/DL (ref 11.5–15.4)
MCH RBC QN AUTO: 29.2 PG (ref 26.8–34.3)
MCHC RBC AUTO-ENTMCNC: 31.7 G/DL (ref 31.4–37.4)
MCV RBC AUTO: 92 FL (ref 82–98)
P AXIS: 46 DEGREES
PLATELET # BLD AUTO: 246 THOUSANDS/UL (ref 149–390)
PMV BLD AUTO: 10.1 FL (ref 8.9–12.7)
POTASSIUM SERPL-SCNC: 4.4 MMOL/L (ref 3.5–5.3)
PR INTERVAL: 126 MS
QRS AXIS: -32 DEGREES
QRSD INTERVAL: 92 MS
QT INTERVAL: 412 MS
QTC INTERVAL: 460 MS
RBC # BLD AUTO: 3.9 MILLION/UL (ref 3.81–5.12)
SODIUM SERPL-SCNC: 135 MMOL/L (ref 135–147)
T WAVE AXIS: 87 DEGREES
VENTRICULAR RATE: 75 BPM
WBC # BLD AUTO: 10.85 THOUSAND/UL (ref 4.31–10.16)

## 2022-11-01 RX ORDER — OXYBUTYNIN CHLORIDE 5 MG/1
5 TABLET ORAL 2 TIMES DAILY
Status: DISCONTINUED | OUTPATIENT
Start: 2022-11-01 | End: 2022-11-04 | Stop reason: HOSPADM

## 2022-11-01 RX ADMIN — PANTOPRAZOLE SODIUM 20 MG: 20 TABLET, DELAYED RELEASE ORAL at 05:38

## 2022-11-01 RX ADMIN — SENNOSIDES, DOCUSATE SODIUM 1 TABLET: 8.6; 5 TABLET ORAL at 18:10

## 2022-11-01 RX ADMIN — OXYCODONE HYDROCHLORIDE 10 MG: 10 TABLET ORAL at 21:06

## 2022-11-01 RX ADMIN — ENOXAPARIN SODIUM 40 MG: 40 INJECTION SUBCUTANEOUS at 10:29

## 2022-11-01 RX ADMIN — OXYBUTYNIN CHLORIDE 5 MG: 5 TABLET ORAL at 18:10

## 2022-11-01 RX ADMIN — SENNOSIDES, DOCUSATE SODIUM 1 TABLET: 8.6; 5 TABLET ORAL at 10:29

## 2022-11-01 RX ADMIN — OXYCODONE HYDROCHLORIDE 10 MG: 10 TABLET ORAL at 07:11

## 2022-11-01 RX ADMIN — Medication 1000 UNITS: at 10:29

## 2022-11-01 RX ADMIN — CARBIDOPA AND LEVODOPA 1 TABLET: 25; 100 TABLET ORAL at 21:06

## 2022-11-01 RX ADMIN — CEFAZOLIN SODIUM 2000 MG: 2 SOLUTION INTRAVENOUS at 00:31

## 2022-11-01 RX ADMIN — ASPIRIN 81 MG CHEWABLE TABLET 81 MG: 81 TABLET CHEWABLE at 10:29

## 2022-11-01 RX ADMIN — LEVOTHYROXINE SODIUM 50 MCG: 50 TABLET ORAL at 05:38

## 2022-11-01 RX ADMIN — CEFAZOLIN SODIUM 2000 MG: 2 SOLUTION INTRAVENOUS at 10:29

## 2022-11-01 RX ADMIN — OXYBUTYNIN CHLORIDE 5 MG: 5 TABLET ORAL at 10:29

## 2022-11-01 RX ADMIN — CARBIDOPA AND LEVODOPA 1 TABLET: 25; 100 TABLET ORAL at 10:29

## 2022-11-01 RX ADMIN — SODIUM CHLORIDE, SODIUM LACTATE, POTASSIUM CHLORIDE, AND CALCIUM CHLORIDE 125 ML/HR: .6; .31; .03; .02 INJECTION, SOLUTION INTRAVENOUS at 10:33

## 2022-11-01 RX ADMIN — CEFAZOLIN SODIUM 2000 MG: 2 SOLUTION INTRAVENOUS at 18:10

## 2022-11-01 RX ADMIN — PIMAVANSERIN TARTRATE 34 MG: 34 CAPSULE ORAL at 21:07

## 2022-11-01 RX ADMIN — ACETAMINOPHEN 975 MG: 325 TABLET ORAL at 15:49

## 2022-11-01 RX ADMIN — ACETAMINOPHEN 975 MG: 325 TABLET ORAL at 21:05

## 2022-11-01 RX ADMIN — PRAVASTATIN SODIUM 20 MG: 20 TABLET ORAL at 15:49

## 2022-11-01 RX ADMIN — EZETIMIBE 10 MG: 10 TABLET ORAL at 21:06

## 2022-11-01 RX ADMIN — METOPROLOL TARTRATE 25 MG: 25 TABLET, FILM COATED ORAL at 10:29

## 2022-11-01 RX ADMIN — ACETAMINOPHEN 975 MG: 325 TABLET ORAL at 05:38

## 2022-11-01 RX ADMIN — CARBIDOPA AND LEVODOPA 1 TABLET: 25; 100 TABLET ORAL at 15:49

## 2022-11-01 RX ADMIN — GLYCERIN, HYPROMELLOSE, POLYETHYLENE GLYCOL 1 DROP: .2; .2; 1 LIQUID OPHTHALMIC at 14:30

## 2022-11-01 RX ADMIN — OXYCODONE HYDROCHLORIDE 5 MG: 5 TABLET ORAL at 00:58

## 2022-11-01 NOTE — CASE MANAGEMENT
Case Management Assessment & Discharge Planning Note    Patient name Rio Sensing  Location /-51 MRN [id number]  : 1950 Date 2022       Current Admission Date: 10/30/2022  Current Admission Diagnosis:Closed fracture dislocation of left ankle   Patient Active Problem List    Diagnosis Date Noted   • Frequent urination 2022   • Fall down steps 10/30/2022   • Closed fracture dislocation of left ankle 10/30/2022   • Parkinson's disease ([facility] [place] [address] ) 10/30/2022   • Hypertensive urgency 10/30/2022   • Gastro-esophageal reflux disease without esophagitis 01/10/2022   • Chronic obstructive pulmonary disease ([facility] [place] [address] ) 2021   • Acquired hypothyroidism 2015      LOS (days): 1  Geometric Mean LOS (GMLOS) (days): 2 70  Days to GMLOS:1 6     OBJECTIVE:    Risk of Unplanned Readmission Score: 12 8         Current admission status: Inpatient       Preferred Pharmacy:   [facility] DR KAYLA ANDERSON [address] [place] [address]  Phone: [phone] Fax: [phone]    Primary Care Provider: Jacy Jimenez DO    Primary Insurance: [employer] [facility]  Secondary Insurance:     ASSESSMENT:  [address] Proxies    There are no active Health Care Proxies on file         Advance Directives  Does patient have a [address] [place] [address] Avenue?: No  Was patient offered paperwork?: Yes (patient declined)  Does patient currently have a Health Care decision maker?: Yes, please see Health Care Proxy section  Does patient have Advance Directives?: No  Was patient offered paperwork?: Yes (patient declined)  Primary Contact: Valeri Myers, spouse         Readmission Root Cause  30 Day Readmission: No    Patient Information  Admitted from[de-identified] Home  Mental Status: Alert  During Assessment patient was accompanied by: Not accompanied during assessment  Assessment information provided by[de-identified] Patient, Spouse  Primary Caregiver: Self  Support Systems: Spouse/significant other, Daughter, Family members  [place] of Residence: [address] do you live in?: [id number] [facility] entry access options   Select all that apply : Stairs  Number of steps to enter home : One Flight (2 steps to get to porch and 18 steps to apartment floor)  Do the steps have railings?: Yes  Type of Current Residence: Apartment  Floor Level: 2  Upon entering residence, is there a bedroom on the main floor (no further steps)?: Yes  Upon entering residence, is there a bathroom on the main floor (no further steps)?: Yes  In the last 12 months, was there a time when you were not able to pay the mortgage or rent on time?: No  In the last 12 months, how many places have you lived?: 1  In the last 12 months, was there a time when you did not have a steady place to sleep or slept in a shelter (including now)?: No  Homeless/housing insecurity resource given?: N/A  Living Arrangements: Lives w/ Spouse/significant other  Is patient a ?: No    Activities of Daily Living Prior to Admission  Functional Status: Assistance  Completes ADLs independently?: Yes  Ambulates independently?: Yes  Does patient use assisted devices?: Yes  Assisted Devices (DME) used: Bedside Commode  Does patient currently own DME?: Yes  What DME does the patient currently own?: Bedside Commode  Does patient have a history of Outpatient Therapy (PT/OT)?: Yes Mikey Hickman  Does the patient have a history of Short-Term Rehab?: No  Does patient have a history of HHC?: No  Does patient currently have [facility]?: No         Patient Information Continued  Income Source: SSI/SSD  Does patient have prescription coverage?: Yes  Within the past 12 months, you worried that your food would run out before you got the money to buy more : Never true  Within the past 12 months, the food you bought just didn't last and you didn't have money to get more : Never true  Food insecurity resource given?: N/A  Does patient receive dialysis treatments?: No  Does patient have a history of substance abuse?: No  Does patient have a history of Mental Health Diagnosis?: No         Means of Transportation  Means of Transport to Appts[de-identified] Family transport  In the past 12 months, has lack of transportation kept you from medical appointments or from getting medications?: No  In the past 12 months, has lack of transportation kept you from meetings, work, or from getting things needed for daily living?: No  Was application for public transport provided?: N/A        DISCHARGE DETAILS:    Discharge planning discussed with[de-identified] patient and spouse  Freedom of Choice: Yes     CM contacted family/caregiver?: Yes             Contacts  Patient Contacts: Daniel Wise, spouse  Relationship to Patient[de-identified] Family  Contact Method: In Person  Reason/Outcome: Continuity of Care, Discharge Planning              CM met with patient and spouse at the bedside, baseline information was obtained  CM discussed the role of CM in helping the patient develop a discharge plan and assist the patient in carry out their plan  Patient lives with spouse in apartment building owned by patient and spouse  Patient and spouse live on second floor, 18 steps to get to apartment  Patient independent with ambulation prior to admission, bedside commode in bedroom, if needed  Patient has 2 daughters who live in one [place] home, one in [place] and one in [facility])  Daughter in [place] works during day, daughter in [facility]) works from home  Patient is able to discharge to [facility] following rehab, if needed  CM discussed with patient acute rehab recommendation from therapy  A post acute care recommendation was made by your care team for Acute Rehab  Discussed [place] of Choice with patient and spouse  Offered patient and spouse blanket referral for agencies via in person  patient and spouse aware the list is custom by insurance and patient's medical needs  CM submitted blanket Acute Rehab referral for patient in [address] [place] [address]

## 2022-11-01 NOTE — UTILIZATION REVIEW
NOTIFICATION OF INPATIENT ADMISSION   AUTHORIZATION REQUEST   SERVICING FACILITY:   [address] [place] [address]  M[address], [facility] Janny Swanson  Tax ID: [id number]  NPI: [id number] ATTENDING PROVIDER:  Attending Name and NPI#: Rona Scheuermann, Md [[id number]]  Address: [facility]  Michele [address]  Phone: [phone]   ADMISSION INFORMATION:  Place of Service: [place] [address]  Place of Service Code: 21  Inpatient Admission Date/Time: 10/31/22 12:31 PM  Discharge Date/Time: No discharge date for patient encounter  Admitting Diagnosis Code/Description:  Unspecified multiple injuries, initial encounter [T07  XXXA]  Closed fracture dislocation of left ankle, initial encounter [F98 233S]     UTILIZATION REVIEW CONTACT:  [employer] Utilization   Network Utilization Review Department  Phone: [phone]  Fax [phone]  Email: [email or web address]  Contact for approvals/pending authorizations, clinical reviews, and discharge  PHYSICIAN ADVISORY SERVICES:  Medical Necessity Denial & [id number] Review  Phone: [phone]  Fax: [phone]  Email: [email or web address]

## 2022-11-01 NOTE — OCCUPATIONAL THERAPY NOTE
Occupational Therapy Evaluation     Evaluation: 0320-7809  Treatment: [id number]    Patient Name: Leif Garces  LOPQF'M Date: 11/1/2022  Problem List  Principal Problem:    Closed fracture dislocation of left ankle  Active Problems:    Acquired hypothyroidism    Gastro-esophageal reflux disease without esophagitis    Fall down steps    Parkinson's disease ([facility] [address] )    Hypertensive urgency    Frequent urination    Past Medical History  Past Medical History:   Diagnosis Date    HLD (hyperlipidemia)     Hypothyroid     Parkinson disease ([facility] [address] )      Past Surgical History  Past Surgical History:   Procedure Laterality Date    ORIF TIBIA & FIBULA FRACTURES Left 10/31/2022    Procedure: OPEN REDUCTION W/ INTERNAL FIXATION (ORIF) ANKLE;  Surgeon: Kenyatta Rivero; Location:  MAIN OR;  Service: Orthopedics           11/01/22 0956   Note Type   Note type Evaluation   Pain Assessment   Pain Assessment Tool FLACC   Pain Score 10 - Worst Possible Pain   Pain Location/Orientation   (L ankle)   Pain Rating: FLACC (Rest) - Face 1   Pain Rating: FLACC (Rest) - Legs 0   Pain Rating: FLACC (Rest) - Activity 0   Pain Rating: FLACC (Rest) - Cry 1   Pain Rating: FLACC (Rest) - Consolability 0   Score: FLACC (Rest) 2   Pain Rating: FLACC (Activity) - Face 1   Pain Rating: FLACC (Activity) - Legs 0   Pain Rating: FLACC (Activity) - Activity 0   Pain Rating: FLACC (Activity) - Cry 1   Pain Rating: FLACC (Activity) - Consolability 0   Score: FLACC (Activity) 2   Restrictions/Precautions   Weight Bearing Precautions Per Order Yes   LLE Weight Bearing Per Order NWB   Braces or Orthoses Splint   Other Precautions Chair Alarm; Bed Alarm; Fall Risk;Pain;WBS;Multiple lines   Home Living   Type of Home Apartment  ([address])   Home Layout One level;Performs ADLs on one level; Able to live on main level with bedroom/bathroom   Bathroom Shower/Tub Tub/shower unit   Bathroom Toilet Standard   Bathroom Equipment Grab bars in shower  ("my  is going to get me a shower chair")   Home Equipment Quad cane  (has 2 canes  was not using PTA)   Additional Comments Pt reports living in a 2nd floor apartment with [address]  Pt ambulates with no AD at baseline   Prior Function   Level of [place] Independent with ADLs; Independent with functional mobility; Independent with IADLS   Lives With Spouse   Receives Help From Family   IADLs Independent with driving; Independent with meal prep; Independent with medication management   Falls in the last 6 months 1 to 4   Comments Pt reports completing ADLs, IADLs, functional ambulatio nand community mobility + driving @ I    ADL   Where Assessed Edge of bed   Grooming Assistance 5  Supervision/Setup   Grooming Deficit Setup   UB Dressing Assistance 5  Supervision/Setup   UB Dressing Deficit Setup   LB Dressing Assistance 2  Maximal Assistance   LB Dressing Deficit Steadying; Requires assistive device for steadying;Verbal cueing;Supervision/safety; Increased time to complete; Don/doff R sock; Don/doff L sock; Thread RLE into pants; Thread LLE into pants;Pull up over hips   Additional Comments Pt completing ADL tasks while seated at EOB  UB Dressing and grooming tasks @ S after set-up  LB Dressing @ Max A for donning socks/pants aroun feet and Mod A for CM around waist with UB supported from RW   Bed Mobility   Supine to Sit 4  Minimal assistance   Additional items Assist x 1; Increased time required;Verbal cues  (trunk management  use of bedrails PRN  Pt reports :"my  is getting my some bed rails")   Additional Comments HOB flat, use of bedrails PRN  vc'ing for side rolling and technique  Transfers   Sit to Stand 4  Minimal assistance   Additional items Assist x 1; Increased time required;Verbal cues   Stand to Sit   [facility] Assist)   Additional items Assist x 1; Increased time required;Verbal cues   Stand pivot 4  Minimal assistance   Additional items Assist x 1; Increased time required;Verbal cues   Additional Comments Pt completing functional transfers with use of RW for UB support  Min A for STS and SPT to recliner chair with vc'ing for safety/tehcnique, RW management an dweight shifting  Pt unable to hop on RLE therefore Pt slowly pivoting/scooting RLE   Balance   Static Sitting Normal   Dynamic Sitting Good   Static Standing Fair   Dynamic Standing Fair -   Activity Tolerance   Activity Tolerance Patient limited by fatigue;Patient limited by pain   Medical Staff Made Aware Spoke with PT, [place]   Nurse Made Aware Spoke with RN   RUE Assessment   RUE Assessment WFL   LUE Assessment   LUE Assessment WFL   Hand Function   Gross Motor Coordination Functional   Fine Motor Coordination Functional   Sensation   Light Touch No apparent deficits   Cognition   Overall Cognitive Status WFL   Arousal/Participation Responsive; Alert; Cooperative   Attention Attends with cues to redirect   Orientation Level Oriented X4   Memory Within functional limits   Following Commands Follows one step commands without difficulty   Assessment   Limitation Decreased ADL status; Decreased UE strength;Decreased Safe judgement during ADL;Decreased endurance;Decreased self-care trans;Decreased high-level ADLs   Prognosis Good   Assessment Pt is a 70 y o  female, admitted to [address] [place] [address] 10/30/2022 d/t experiencing L ankle pain after experiencing a fall  Dx: closed fracture dislocation of L ankle  Pt underwent surgical intervention on 10/31/2022 - OPEN REDUCTION W/ INTERNAL FIXATION (ORIF) ANKLE (Left)  Pt is currently NWB LLE  Pt with PMHx impacting their performance during ADL tasks, including: hyperlipidemia, hypothyroid, parkinsons  Prior to admission to the hospital Pt was performing ADLs without physical assistance  IADLs without physical assistance  Functional transfers/ambulation without physical assistance  Cognitive status was PTA was intact   OT order placed to assess Pt's ADLs, cognitive status, and performance during functional tasks in order to maximize safety and independence while making most appropriate d/c recommendations  Pt's clinical presentation is currently unstable/unpredictable given new onset deficits that effect Pt's occupational performance and ability to safely return to PLOF including decrease activity tolerance, decrease standing balance, decrease performance during ADL tasks, decrease safety awareness , decrease UB MS, increased pain, decrease generalized strength, decrease activity engagement, decrease performance during functional transfers, high fall risk and limited insight to deficits combined with medical complications of hypertension , pain impacting overall mobility status, edema/swelling, decreased skin integrity, incontinence, need for input for mobility technique/safety and new NWB status LLE  Personal factors affecting Pt at time of initial evaluation include: inaccessible home environment, step(s) to enter environment, availability as recommended, past experience, inability to perform current job functions, inability to perform IADLs, inability to perform ADLs, new need for AD, inability to ambulate household distances, inability to navigate community distances, limited insight into impairments, decreased initiation and engagement and recent fall(s)/fall history  Pt will benefit from continued skilled OT services to address deficits as defined above and to maximize level independence/participation during ADLs and functional tasks to facilitate return toward PLOF and improved quality of life  From an occupational therapy standpoint, recommendation at time of d/c would be post acute rehab  Plan   Treatment Interventions ADL retraining;Functional transfer training;UE strengthening/ROM; Endurance training;Patient/family training;Equipment evaluation/education; Neuromuscular reeducation; Compensatory technique education;Continued evaluation; Energy conservation; Activityengagement   Goal Expiration Date 11/15/22   OT Treatment Day 1   OT Frequency 3-5x/wk   Recommendation   OT Discharge Recommendation Post acute rehabilitation services   [facility] Daily Activity Inpatient   Lower Body Dressing 1   Bathing 2   Toileting 2   Upper Body Dressing 3   Grooming 3   Eating 4   Daily Activity Raw Score 15   Daily Activity Standardized Score (Calc for Raw Score >=11) 34 69   AM-PAC Applied Cognition Inpatient   Following a Speech/Presentation 4   Understanding Ordinary Conversation 4   Taking Medications 4   Remembering Where Things Are Placed or Put Away 4   Remembering List of 4-5 Errands 4   Taking Care of Complicated Tasks 4   Applied Cognition Raw Score 24   Applied Cognition Standardized Score 62 21   Additional Treatment Session   Start Time 1005   End Time 1015   Treatment Assessment Pt competing BUE HEP while seated upright in recliner chair  Pt completing 2 sets of 10 with exercises focusing on biceps, triceps, and shoulder/chest  Pt tolerating exercises well with Min vc'ing for maintaining proper movements and speed  Exercises completed this date to maximize overall BUE MS and increased safety and independence during ADLs and functional transfers  Pt verbalizes understanding and reports they will complete daily  Pt would benefit from continued review to ensure proper carryover upon d/c from [address] [place] [address]  At end of session, Pt seated OOB in recliner chair with call bell in reach, chair alarm intact and all needs met at this time  Additional Treatment Day 1     The patient's raw score on the AM-PAC Daily Activity inpatient short form is 15, standardized score is 34 69, less than 39 4  Patients at this level are likely to benefit from DC to post-acute rehabilitation services  Please refer to the recommendation of the Occupational Therapist for safe DC planning  Pt goals to be met by 11/15/2022    Pt will demonstrate ability to complete LB dressing @ S in order to increase safety and independence during meaningful tasks     Pt will demonstrate ability to nadia/doff socks/shoes while sitting EOB @ S in order to increase safety and independence during meaningful tasks  Pt will demonstrate ability to complete toileting tasks including CM and pericare @ S in order to increase safety and independence during meaningful tasks  Pt will demonstrate ability to complete EOB, chair, toilet/commode transfers @ S in order to increase performance and participation during functional tasks  Pt will demonstrate ability to stand for 4-5 minutes while maintaining F+ balance with use of RW for UB support PRN  Pt will demonstrate ability to tolerate 30-35 minute OT session with no vc'ing for deep breathing or use of energy conservation techniques in order to increase activity tolerance during functional tasks  Pt will demonstrate Good carryover of use of energy conservation/compensatory strategies during ADLs and functional tasks in order to increase safety and reduce risk for falls  Pt will demonstrate Good attention and participation in continued evaluation of functional ambulation house hold distances in order to assist with safe d/c planning  Pt will attend to continued cognitive assessments 100% of the time in order to provide most appropriate d/c recommendations  Pt will follow 100% simple 2-step commands and be A&O x4 consistently with environmental cues to increase participation in functional activities  Pt will identify 3 areas of interest/hobbies and 1 intervention on how to incorporate into daily life in order to increase interaction with environment and peers as well as increase participation in meaningful tasks  Pt will demonstrate 100% carryover of BUE HEP in order to increase BUE MS and increase performance during functional tasks upon d/c home      Joie Anne OTR/L

## 2022-11-01 NOTE — PROGRESS NOTES
[address]  Progress Note Daija Jamil 1950, 70 y o  female MRN: [id number]  Unit/Bed#: -01 Encounter: [id number]  Primary Care Provider: Ronit Varma DO   Date and time admitted to hospital: 10/30/2022  1:29 PM    * Closed fracture dislocation of left ankle  Assessment & Plan  · Pain, swelling inability to walk follow-up fall  · X-ray with dislocation and closed fracture left ankle (official read not available)  · ED left ankle reduced and splinted  · Pain control  · Orthopedic consult  · Bimalleolar fracture subluxation left ankle   · POD 1 - ORIF left ankle  · Neurovascular checks  · PT/OT consult pending     Frequent urination  Assessment & Plan  · Patient has had frequent urination for "quite some time"   · Was recently put on oxybutynin - will now continue as inpatient (was not listed on med rec)  · Was to have appointment with urogynegology however was admitted to hospital   UA negative for UTI   · Encourage follow up with urogynecology as outpatient     Hypertensive urgency  Assessment & Plan  · Noted intermittent 180s-190s in ED and on arrival to unit  · PTA Metoprolol tartrate 25 mg daily  · PRN pain control  · Prn lopressor 5mg    Resolved - BP controlled with home medications      Parkinson's disease ([facility] [address] )  Assessment & Plan  · Continue PTA Sinemet    Fall down steps  Assessment & Plan  · Presents with left ankle pain and swelling after fall down 2 cement steps twisting left ankle, unable to walk after    Not on anticoagulation  · X-ray with closed fracture and dislocation of left ankle  · Ortho consult  · PT/OT consult       Gastro-esophageal reflux disease without esophagitis  Assessment & Plan  · PTA omeprazole    Acquired hypothyroidism  Assessment & Plan  · Continue PTA levothyroxine  · reports frequent dry mouth and voiding  · TSH wnl   · HgbA1C - prediabetic range - counseled       VTE Pharmacologic Prophylaxis: VTE Score: 9 High Risk (Score >/= 5) - Pharmacological DVT Prophylaxis Ordered: enoxaparin (Lovenox)  Sequential Compression Devices Ordered  Patient Centered Rounds: I performed bedside rounds with nursing staff today  Discussions with Specialists or Other Care Team Provider: Cm, ortho     Education and Discussions with Family / Patient: Attempted to update  () via phone  Left voicemail  Time Spent for Care: 30 minutes  More than 50% of total time spent on counseling and coordination of care as described above  Current Length of Stay: 1 day(s)  Current Patient Status: Inpatient   Certification Statement: The patient will continue to require additional inpatient hospital stay due to ankle fracture, therapy evaluations   Discharge Plan: Anticipate discharge in 24-48 hrs to discharge location to be determined pending rehab evaluations  Code Status: Level 3 - DNAR and DNI    Subjective:   Seen and examined  Pain is better now after pain medications  No headache, shortness of breath, chest pain, dizziness  Discussed her frequent urination and restarted outpatient oxybutynin that was not originally included on her med rec  Objective:     Vitals:   Temp (24hrs), Av 1 °F (36 7 °C), Min:97 9 °F (36 6 °C), Max:98 6 °F (37 °C)    Temp:  [97 9 °F (36 6 °C)-98 6 °F (37 °C)] 98 4 °F (36 9 °C)  HR:  [64-80] 76  Resp:  [16-19] 19  BP: (116-180)/(59-99) 153/91  SpO2:  [91 %-99 %] 96 %  Body mass index is 38 26 kg/m²  Input and Output Summary (last 24 hours): Intake/Output Summary (Last 24 hours) at 2022 0938  Last data filed at 2022 0541  Gross per 24 hour   Intake 1000 ml   Output 700 ml   Net 300 ml       Physical Exam:   Physical Exam  Vitals and nursing note reviewed  Constitutional:       General: She is not in acute distress  Appearance: Normal appearance  HENT:      Head: Normocephalic and atraumatic  Nose: No congestion        Mouth/Throat:      Mouth: Mucous membranes are moist  Eyes:      Conjunctiva/sclera: Conjunctivae normal    Cardiovascular:      Rate and Rhythm: Normal rate and regular rhythm  Pulses: Normal pulses  Heart sounds: Normal heart sounds  No murmur heard  Pulmonary:      Effort: Pulmonary effort is normal  No respiratory distress  Breath sounds: Normal breath sounds  Abdominal:      General: Bowel sounds are normal       Palpations: Abdomen is soft  Tenderness: There is no abdominal tenderness  Musculoskeletal:         General: Normal range of motion  Right lower leg: No edema  Left lower leg: No edema  Comments: Splint/bandage around left ankle, N/V intact   Skin:     General: Skin is warm and dry  Capillary Refill: Capillary refill takes less than 2 seconds  Neurological:      Mental Status: She is alert and oriented to person, place, and time  Sensory: No sensory deficit            Additional Data:     Labs:  Results from last 7 days   Lab Units 11/01/22  0515   WBC Thousand/uL 10 85*   HEMOGLOBIN g/dL 11 4*   HEMATOCRIT % 36 0   PLATELETS Thousands/uL 246     Results from last 7 days   Lab Units 11/01/22  0515   SODIUM mmol/L 135   POTASSIUM mmol/L 4 4   CHLORIDE mmol/L 100   CO2 mmol/L 29   BUN mg/dL 14   CREATININE mg/dL 1 00   ANION GAP mmol/L 6   CALCIUM mg/dL 7 7*   GLUCOSE RANDOM mg/dL 165*             Results from last 7 days   Lab Units 10/30/22  1829   HEMOGLOBIN A1C % 5 9*           Lines/Drains:  Invasive Devices  Report    Peripheral Intravenous Line  Duration           Peripheral IV 10/30/22 Right Antecubital 1 day          Drain  Duration           External Urinary Catheter <1 day                      Imaging: Reviewed radiology reports from this admission including: xray(s)    Recent Cultures (last 7 days):         Last 24 Hours Medication List:   Current Facility-Administered Medications   Medication Dose Route Frequency Provider Last Rate   • acetaminophen  975 mg Oral [facility] Amanda Zhang PA-C • aspirin  81 mg Oral Daily Lorrane Chaffee, PA-C     • carbidopa-levodopa  1 tablet Oral TID Lorrane Chaffee, NONA     • cefazolin  2,000 mg Intravenous C9J Lorrane Chaffee, PA-C 2,000 mg (11/01/22 0031)   • cholecalciferol  1,000 Units Oral Daily Lorrane Chaffee, PA-C     • enoxaparin  40 mg Subcutaneous Daily Lorrane Johnna, PA-C     • ezetimibe  10 mg Oral HS Lorrane Johnna, PAEmanuelC     • glycerin-hypromellose-  1 drop Both Eyes Q4H PRN Sylvester Koo, NONA     • lactated ringers  1,000 mL Intravenous Once PRN Lorrane Johnna, NONA      And   • lactated ringers  1,000 mL Intravenous Once PRN Lorrane Chaffee, PAEMMANUEL     • lactated ringers  125 mL/hr Intravenous Continuous Lorrane Chaffee, PA-C 125 mL/hr (10/31/22 2156)   • levothyroxine  50 mcg Oral Early Morning Lorrane Chaffee, PAEmanuelC     • metoprolol  5 mg Intravenous Q8H PRN Lorrane Chaffee, PA-C     • metoprolol tartrate  25 mg Oral Daily Lorrane Chaffee, PAEmanuelC     • ondansetron  4 mg Intravenous Q6H PRN Lorrane Chaffee, PA-C     • oxybutynin  5 mg Oral BID Sylvester Koo, PA-C     • oxyCODONE  10 mg Oral Q4H PRN Lorrane Chaffee, PA-C     • oxyCODONE  5 mg Oral Q4H PRN Lorrane Chaffee, PA-C     • pantoprazole  20 mg Oral Early Morning Lorrane Johnna, PA-C     • Pimavanserin Tartrate  34 mg Oral HS Lorrane Johnna, PAEmanuelC     • polyethylene glycol  17 g Oral Daily PRN Lorrane Chaffee, PAEmanuelC     • pravastatin  20 mg Oral Daily With Cody Francis, NONA     • senna-docusate sodium  1 tablet Oral BID Lorrane Johnna, PA-C     • sodium chloride  1,000 mL Intravenous Once PRN Lorrane Johnna, NONA      And   • sodium chloride  1,000 mL Intravenous Once PRN Lorrane Johnna, NONA          Today, Patient Was Seen By: Sylvester Koo    **Please Note: This note may have been constructed using a voice recognition system  **

## 2022-11-01 NOTE — ASSESSMENT & PLAN NOTE
· Continue PTA levothyroxine  · reports frequent dry mouth and voiding  · TSH wnl   · HgbA1C - prediabetic range - counseled

## 2022-11-01 NOTE — ASSESSMENT & PLAN NOTE
· Noted intermittent 180s-190s in ED and on arrival to unit  · PTA Metoprolol tartrate 25 mg daily  · PRN pain control  · Prn lopressor 5mg    Resolved - BP controlled with home medications

## 2022-11-01 NOTE — PROGRESS NOTES
Progress Note - Orthopedics   Bobbi Coker 70 y o  female MRN: [id number]  Unit/Bed#: -01      Subjective:    70 y  o female POD1 s/p left ankle ORIF  No acute overnight events, no new complaints  The patient is doing well today  She notes an increase in pain along her ankle, but admits that the ankle does feel better than prior to the surgery  The patient's pain is well controlled with pain medication  Denies fevers, chills, CP, SOB, N/V, numbness or tingling  The patient states that she has been up and ambulating with a walker with PT this morning  She notes her concerns about going home once discharged as she has 18 steps up to her home       Labs:  0   Lab Value Date/Time    HCT 36 0 11/01/2022 0515    HCT 39 5 10/30/2022 1829    HCT 37 7 06/02/2022 2239    HGB 11 4 (L) 11/01/2022 0515    HGB 12 1 10/30/2022 1829    HGB 12 2 06/02/2022 2239    WBC 10 85 (H) 11/01/2022 0515    WBC 12 98 (H) 10/30/2022 1829    WBC 12 36 (H) 06/02/2022 2239       Meds:    Current Facility-Administered Medications:   •  acetaminophen (TYLENOL) tablet 975 mg, 975 mg, Oral, Q8H [facility], Marilee West PA-C, 975 mg at 11/01/22 1385  •  aspirin chewable tablet 81 mg, 81 mg, Oral, Daily, Marilee West PA-C, 81 mg at 11/01/22 1029  •  carbidopa-levodopa (SINEMET)  mg per tablet 1 tablet, 1 tablet, Oral, TID, Marilee West PA-C, 1 tablet at 11/01/22 1029  •  ceFAZolin (ANCEF) IVPB (premix in dextrose) 2,000 mg 50 mL, 2,000 mg, Intravenous, Q8H, Padma Monterroso PA-C, Last Rate: 100 mL/hr at 11/01/22 1029, 2,000 mg at 11/01/22 1029  •  cholecalciferol (VITAMIN D3) tablet 1,000 Units, 1,000 Units, Oral, Daily, Marilee West PA-C, 1,000 Units at 11/01/22 1029  •  enoxaparin (LOVENOX) subcutaneous injection 40 mg, 40 mg, Subcutaneous, Daily, Marilee West PA-C, 40 mg at 11/01/22 1029  •  ezetimibe (ZETIA) tablet 10 mg, 10 mg, Oral, HS, Marilee West PA-C, 10 mg at 10/31/22 1223  •  glycerin-hypromellose- (ARTIFICIAL TEARS) ophthalmic solution 1 drop, 1 drop, Both Eyes, Q4H PRN, Cherylene Bunch, PA-C  •  lactated ringers bolus 1,000 mL, 1,000 mL, Intravenous, Once PRN **AND** lactated ringers bolus 1,000 mL, 1,000 mL, Intravenous, Once PRN, José Antonio Franco PA-C  •  lactated ringers infusion, 125 mL/hr, Intravenous, Continuous, José Antonio Franco PA-C, Last Rate: 125 mL/hr at 11/01/22 1033, 125 mL/hr at 11/01/22 1033  •  levothyroxine tablet 50 mcg, 50 mcg, Oral, Early Morning, José Antonio Franco PA-C, 50 mcg at 11/01/22 4361  •  metoprolol (LOPRESSOR) injection 5 mg, 5 mg, Intravenous, Q8H PRN, José Antonio Franco PA-C  •  metoprolol tartrate (LOPRESSOR) tablet 25 mg, 25 mg, Oral, Daily, José Antonio Franco PA-C, 25 mg at 11/01/22 1029  •  ondansetron (ZOFRAN) injection 4 mg, 4 mg, Intravenous, Q6H PRN, José Antonio Franco PA-C  •  oxybutynin [facility]) tablet 5 mg, 5 mg, Oral, BID, Cherylene Bunch, PA-C, 5 mg at 11/01/22 1029  •  oxyCODONE (ROXICODONE) immediate release tablet 10 mg, 10 mg, Oral, Q4H PRN, José Antonio Franco PA-C, 10 mg at 11/01/22 9520  •  oxyCODONE (ROXICODONE) IR tablet 5 mg, 5 mg, Oral, Q4H PRN, José Antonio Franco PA-C, 5 mg at 11/01/22 8086  •  pantoprazole (PROTONIX) EC tablet 20 mg, 20 mg, Oral, Early Morning, José Antonio Franco PA-C, 20 mg at 11/01/22 1276  •  Pimavanserin Tartrate CAPS 34 mg, 34 mg, Oral, HS, José Antonio Franco PA-C, 34 mg at 10/31/22 2257  •  polyethylene glycol (MIRALAX) packet 17 g, 17 g, Oral, Daily PRN, José Antonio Franco PA-C  •  pravastatin (PRAVACHOL) tablet 20 mg, 20 mg, Oral, Daily With Meir Robledo PA-C, 20 mg at 10/30/22 1749  •  senna-docusate sodium (SENOKOT S) 8 6-50 mg per tablet 1 tablet, 1 tablet, Oral, BID, José Antonio Franco PA-C, 1 tablet at 11/01/22 1029  •  sodium chloride 0 9 % bolus 1,000 mL, 1,000 mL, Intravenous, Once PRN **AND** sodium chloride 0 9 % bolus 1,000 mL, 1,000 mL, Intravenous, Once PRN, José Antonio Franco PA-C    Blood Culture:   Lab Results   Component Value Date    BLOODCX Streptococcus mitis oralis group (A) 06/02/2022    BLOODCX Staphylococcus coagulase negative (A) 06/02/2022       Wound Culture:   No results found for: WOUNDCULT    Ins and Outs:  I/O last 24 hours: In: 2180 [P O :180; I V :2000]  Out: 700 [Urine:700]      Physical:  Vitals:    11/01/22 1029   BP: 129/68   Pulse: 70   Resp:    Temp:    SpO2:      Musculoskeletal: left Lower Extremity  · The patient is sitting in her chair, comfortably, in no acute distress  · The splint is clean, dry, and intact without serosanguinous strike-through  Skin above and below the splint without lesions, ecchymosis, erythema, swelling, warmth, or other signs of infection or irritation  · Sensation intact to light touch distally  · Patient is able to wiggle all toes  · Soft lower extremity compartments, negative Анна's sign  · Digits warm and well perfused  · Capillary refill < 2 seconds    Assessment:    70 y  o female POD1 s/p left ankle ORIF  Patient doing well post-operatively  ambulatory dysfunction; Parkinson's disease; hypertensive urgency; GERD; hypothyroidism    Plan:  · NWB LLE  · Keep splint clean, dry, and intact  May reinforce splint with abds/ace wrap as needed  · HGB 11 4 this AM  Will continue to monitor for signs and symptoms of ABLA, and administer IVF/prbc as indicated   · PT/OT gait training with NWB restrictions  · Pain control per primary team  · DVT ppx with Lovenox in hospital  May transition to ASA 325mg BID if discharged home  DVT prophylaxis required for a total of 21 days post-operatively  · Dispo: Ortho will follow   · Discharge once medically cleared, home vs  [facility]  CM on board  · Once discharged, the patient should follow up with Dr Alka ta within 7-10 days for ongoing management of her fracture       Olivia Olguin PA-C

## 2022-11-01 NOTE — PLAN OF CARE
Problem: PHYSICAL THERAPY ADULT  Goal: Performs mobility at highest level of function for planned discharge setting  See evaluation for individualized goals  Description: Treatment/Interventions: ADL retraining, Functional transfer training, LE strengthening/ROM, Elevations, Therapeutic exercise, Endurance training, Patient/family training, Equipment eval/education, Bed mobility, Gait training, Compensatory technique education, Spoke to nursing, Spoke to case management, OT  Equipment Recommended:  (TBD by rehab)       See flowsheet documentation for full assessment, interventions and recommendations  [id number] by Bettye Do PT  Note: Prognosis: Good  Problem List: Decreased strength, Decreased endurance, Impaired balance, Decreased mobility, Decreased safety awareness, Obesity, Decreased skin integrity, Orthopedic restrictions, Pain  Pt tolerated session fairly  SHe is able to miantain NWB L LE with transfers and was able to ambulate with left knee walker attachment  She requires minimal manual and moderate verbal cues for technique and sequence  She is limited by decreased strength, balance, endurance and increased pain  SHe will continue to benefit from PT services to maximize LOF  Barriers to Discharge: Decreased caregiver support, Inaccessible home environment  Barriers to Discharge Comments: requires assistance to complete mobility, has 18 steps to access apartment  PT Discharge Recommendation: Post acute rehabilitation services (consider acute inpatient rehab)    See flowsheet documentation for full assessment

## 2022-11-01 NOTE — PHYSICAL THERAPY NOTE
PHYSICAL THERAPY EVALUATION  NAME:  Kael Molina  DATE: 11/01/22    AGE:   70 y o  Mrn:   [id number]  ADMIT DX:  Unspecified multiple injuries, initial encounter [T07  XXXA]  Closed fracture dislocation of left ankle, initial encounter [E71 532N]    Past Medical History:   Diagnosis Date   • HLD (hyperlipidemia)    • Hypothyroid    • Parkinson disease ([address] )      Length Of Stay: 1  Performed at least 2 patient identifiers during session: Name and Birthday  PHYSICAL THERAPY EVALUATION :    11/01/22 0944   PT Last Visit   PT Visit Date 11/01/22   Note Type   Note type Evaluation   Pain Assessment   Pain Assessment Tool FLACC   Pain Score 10 - Worst Possible Pain   Pain Location/Orientation Orientation: Left   Pain Rating: FLACC (Rest) - Face 1   Pain Rating: FLACC (Rest) - Legs 0   Pain Rating: FLACC (Rest) - Activity 0   Pain Rating: FLACC (Rest) - Cry 1   Pain Rating: FLACC (Rest) - Consolability 0   Score: FLACC (Rest) 2   Pain Rating: FLACC (Activity) - Face 1   Pain Rating: FLACC (Activity) - Legs 0   Pain Rating: FLACC (Activity) - Activity 0   Pain Rating: FLACC (Activity) - Cry 1   Pain Rating: FLACC (Activity) - Consolability 0   Score: FLACC (Activity) 2   Restrictions/Precautions   Weight Bearing Precautions Per Order Yes   LLE Weight Bearing Per Order NWB   Braces or Orthoses Splint   Other Precautions WBS; Chair Alarm; Bed Alarm; Fall Risk;Pain;Multiple lines   Home Living   Type of Home Apartment  ([address])   Home Layout One level;Performs ADLs on one level; Able to live on main level with bedroom/bathroom   Bathroom Shower/Tub Tub/shower unit   Bathroom Toilet Standard   Bathroom Equipment Grab bars in shower  (reports getting shower chair)   Home Equipment Quad cane  (has 2 quad canes, wasn't using PTA)   Additional Comments Reports living in a 2nd floor apartment with [address]  Pt wasn't using an AD PTA  Prior Function   Level of [place] Independent with ADLs; Independent with functional mobility; Needs assistance with functional mobility   Lives With Spouse   Receives Help From Family   IADLs Independent with driving; Independent with meal prep; Independent with medication management   Falls in the last 6 months 1 to 4   Comments Reports being independent with mobility, ADLs and IADLs  Reports her  helps her as needed with IADLs  General   Additional Pertinent History Pt is s/p OPEN REDUCTION W/ INTERNAL FIXATION (ORIF) ANKLE (Left) 10/31/22  Pt is NWB L LE  L LE edema  Family/Caregiver Present No   Cognition   Orientation Level Oriented X4   Following Commands Follows one step commands without difficulty   Subjective   Subjective "I plan to go home "   RLE Assessment   RLE Assessment WFL  (4-/5)   LLE Assessment   LLE Assessment WFL  (3+/5 except left ankle not assessed in splint)   Light Touch   RLE Light Touch Grossly intact   LLE Light Touch Grossly intact   Bed Mobility   Rolling R 4  Minimal assistance   Additional items Assist x 1; Increased time required;Verbal cues   Supine to Sit 4  Minimal assistance   Additional items Assist x 1; Increased time required;Verbal cues  (trunk management  pt reports spouse is getting her bedrails )   Additional Comments HOB flat  increased time and effort required to achieve sitting EOB with mod verbal cues for sequence and technique   Transfers   Sit to Stand 4  Minimal assistance   Additional items Assist x 1; Increased time required;Verbal cues   Stand to Sit   (steadying assistance)   Additional items Assist x 1; Increased time required;Verbal cues   Stand pivot 4  Minimal assistance   Additional items Assist x 1; Increased time required;Verbal cues   Additional Comments verbal and visual instruction provided for transfer technique and sequence with L LE NWB  Pt verbalized understanding  verbal cues for hand placement, technique  required minAx1 to achieve standing  L LE NWB throughout   spt with pt pivoting on R LE, unable to hop, with verbal cues for technique and manual cues for wt shifting and balance  pt c/o some ddizziness and nausea sititng EOB, reporting "I don't take pain meds "   Ambulation/Elevation   Distance pt unable to hop on R LE  Balance   Static Sitting Normal   Dynamic Sitting Good   Static Standing Fair   Dynamic Standing Fair -   Ambulatory Fair -   Activity Tolerance   Activity Tolerance Patient limited by fatigue;Patient limited by pain   Medical Staff Made Aware OTPanchito   Nurse Made Aware RN   Assessment   Prognosis Good   Problem List Decreased strength;Decreased endurance; Impaired balance;Decreased mobility; Decreased safety awareness; Obesity; Decreased skin integrity;Orthopedic restrictions;Pain   Barriers to Discharge Decreased caregiver support; Inaccessible home environment   Barriers to Discharge Comments requires assistance to complete mobility, has 18 steps to access apartment  Goals   Patient Goals "Go home"   [facility] Expiration Date 11/15/22   PT Treatment Day 1   Plan   Treatment/Interventions ADL retraining;Functional transfer training;LE strengthening/ROM; Elevations; Therapeutic exercise; Endurance training;Patient/family training;Equipment eval/education; Bed mobility;Gait training; Compensatory technique education;Spoke to nursing;Spoke to case management;OT   PT Frequency 3-5x/wk   Recommendation   PT Discharge Recommendation Post acute rehabilitation services  (consider acute inpatient rehab)   Equipment Recommended   (TBD by rehab)   Shanda [address] 435   Turning in Bed Without Bedrails 3   Lying on Back to Sitting on Edge of Flat Bed 3   Moving Bed to Chair 3   Standing Up From Chair 3   Walk in Room 1   Climb 3-5 Stairs 1   Basic Mobility Inpatient Raw Score 14   Basic Mobility Standardized Score 35 55   Highest Level Of Mobility   JH-HLM Goal 4: Move to chair/commode   JH-HLM Achieved 4: Move to chair/commode   Additional Treatment Session   Start Time 1001   End Time 1011   Treatment Assessment Pt tolerated session fairly  SHe is able to miantain NWB L LE with transfers and was able to ambulate with left knee walker attachment  She requires minimal manual and moderate verbal cues for technique and sequence  She is limited by decreased strength, balance, endurance and increased pain  SHe will continue to benefit from PT services to maximize LOF  Equipment Use initiated knee walker attachment on RW  verbal and visual instruction provided prior to trial  pt verbalized understanding  sit to stand with minAx1 with verbal cues for technique and sequence  L LE NWB throughout  verbal cues for Left knee placement  able to ambulate 4'x1 with chair follow with minimal to steadying assistance with further distance limited by fatigue  mod verbal cues for sequence and technique  End of Consult   Patient Position at End of Consult Bedside chair;Bed/Chair alarm activated; All needs within reach     (Please find full objective findings from PT assessment regarding body systems outlined above)  Assessment: Pt is a 70 y o  female seen for PT evaluation s/p admission to [address] [place] [address] on 10/30/2022 with Closed fracture dislocation of left ankle  Order placed for PT services    Upon evaluation: Pt is presenting with impaired functional mobility due to pain, decreased strength, decreased endurance, impaired balance, gait deviations, decreased safety awareness, orthopedic restrictions, fall risk, LE edema, and impaired skin integrity requiring minimal assistance for bed mobility, minimal assistance for transfers and unable to hop to ambulate with RW  Pt's clinical presentation is currently unpredictable given the functional mobility deficits above, especially weakness, edema of extremities, decreased skin integrity, decreased endurance, gait deviations, pain, decreased activity tolerance, decreased functional mobility tolerance and decreased safety awareness, coupled with fall risks as indicated by AM-PAC 6-Clicks: 15/33 as well as hx of falls, impaired balance, polypharmacy and decreased safety awareness and combined with medical complications of hypertension , pain impacting overall mobility status, abnormal CBC, abnormal blood sugars and need for input for mobility technique/safety  Pt's PMHx and comorbidities that may affect physical performance and progress include: COPD and Parkinson's Disease  Personal factors affecting pt at time of IE include: inaccessible home environment, step(s) to enter environment, inability to perform IADLs, inability to perform ADLs, inability to navigate level surfaces without external assistance, inability to ambulate household distances and recent fall(s)/fall history  Pt will benefit from continued skilled PT services to address deficits as defined above and to maximize level of functional mobility to facilitate return toward PLOF and improved QOL  From PT/mobility standpoint, recommendation at time of d/c would be Short term rehab pending progress in order to reduce fall risk and maximize pt's functional independence and consistency with mobility in order to facilitate return to PLOF  Recommend trial with walker next 1-2 sessions and ther ex next 1-2 sessions  The patient's AM-PAC Basic Mobility Inpatient Short Form Raw Score is 14  A Raw score of less than or equal to 16 suggests the patient may benefit from discharge to post-acute rehabilitation services  Please also refer to the recommendation of the Physical Therapist for safe discharge planning  Goals: Pt will: Perform bed mobility tasks with modified I to reposition in bed and prepare for transfers  Pt will perform transfers with modified I with least restrictive technique/LRAD (knee walker attachment with RW versus just RW) to decrease burden of care, decrease risk for falls, improve activity tolerance and demonstrate compliance with WB limitations and prepare for ambulation   Pt will ambulate with LRAD (RW with or without knee walker attachment) for >/= 76' with  modified I  to decrease burden of care, decrease risk for falls, improve activity tolerance, demonstrate compliance with WB limitations and improve gait quality and to access home environment  Pt will complete >/= 12 steps with least restrictive technique (hopping, bumping on buttock or shower chair  with unilateral handrail with steadying assistance to decrease burden of care, return to home with MARCOS, decrease risk for falls, improve activity tolerance and demonstrate compliance with WB limitations  Pt will increase B LE strength >/= 1/2 MMT grade to facilitate functional mobility  Pt will self propel manual wheelchair >/= 150' modified independent to access community environment        Jim Ojeda

## 2022-11-01 NOTE — PLAN OF CARE
Problem: Potential for Falls  Goal: Patient will remain free of falls  Description: INTERVENTIONS:  - Educate patient/family on patient safety including physical limitations  - Instruct patient to call for assistance with activity   - Consult OT/PT to assist with strengthening/mobility   - Keep Call bell within reach  - Keep bed low and locked with side rails adjusted as appropriate  - Keep care items and personal belongings within reach  - Initiate and maintain comfort rounds  - Make Fall Risk Sign visible to staff  - Offer Toileting every 2 Hours, in advance of need  - Initiate/Maintain bed and chair alarm  - Obtain necessary fall risk management equipment: walker  - Apply yellow socks and bracelet for high fall risk patients  - Consider moving patient to room near nurses station  Outcome: Progressing     Problem: MOBILITY - ADULT  Goal: Maintain or return to baseline ADL function  Description: INTERVENTIONS:  -  Assess patient's ability to carry out ADLs; assess patient's baseline for ADL function and identify physical deficits which impact ability to perform ADLs (bathing, care of mouth/teeth, toileting, grooming, dressing, etc )  - Assess/evaluate cause of self-care deficits   - Assess range of motion  - Assess patient's mobility; develop plan if impaired  - Assess patient's need for assistive devices and provide as appropriate  - Encourage maximum independence but intervene and supervise when necessary  - Involve family in performance of ADLs  - Assess for home care needs following discharge   - Consider OT consult to assist with ADL evaluation and planning for discharge  - Provide patient education as appropriate  Outcome: Progressing  Goal: Maintains/Returns to pre admission functional level  Description: INTERVENTIONS:  - Perform BMAT or MOVE assessment daily    - Set and communicate daily mobility goal to care team and patient/family/caregiver     - Collaborate with rehabilitation services on mobility goals if consulted  - Perform Range of Motion 3 times a day  - Reposition patient every 3 hours    - Dangle patient 3 times a day  - Stand patient 3 times a day  - Ambulate patient 3 times a day  - Out of bed to chair 3 times a day   - Out of bed for meals 3 times a day  - Out of bed for toileting  - Record patient progress and toleration of activity level   Outcome: Progressing     Problem: PAIN - ADULT  Goal: Verbalizes/displays adequate comfort level or baseline comfort level  Description: Interventions:  - Encourage patient to monitor pain and request assistance  - Assess pain using appropriate pain scale  - Administer analgesics based on type and severity of pain and evaluate response  - Implement non-pharmacological measures as appropriate and evaluate response  - Consider cultural and social influences on pain and pain management  - Notify physician/advanced practitioner if interventions unsuccessful or patient reports new pain  Outcome: Progressing     Goal: Maintain or return to baseline ADL function  Description: INTERVENTIONS:  -  Assess patient's ability to carry out ADLs; assess patient's baseline for ADL function and identify physical deficits which impact ability to perform ADLs (bathing, care of mouth/teeth, toileting, grooming, dressing, etc )  - Assess/evaluate cause of self-care deficits   - Assess range of motion  - Assess patient's mobility; develop plan if impaired  - Assess patient's need for assistive devices and provide as appropriate  - Encourage maximum independence but intervene and supervise when necessary  - Involve family in performance of ADLs  - Assess for home care needs following discharge   - Consider OT consult to assist with ADL evaluation and planning for discharge  - Provide patient education as appropriate  Outcome: Progressing     Problem: DISCHARGE PLANNING  Goal: Discharge to home or other facility with appropriate resources  Description: INTERVENTIONS:  - Identify barriers to discharge w/patient and caregiver  - Arrange for needed discharge resources and transportation as appropriate  - Identify discharge learning needs (meds, wound care, etc )  - Arrange for interpretive services to assist at discharge as needed  - Refer to Case Management Department for coordinating discharge planning if the patient needs post-hospital services based on physician/advanced practitioner order or complex needs related to functional status, cognitive ability, or social support system  Outcome: Progressing     Problem: Knowledge Deficit  Goal: Patient/family/caregiver demonstrates understanding of disease process, treatment plan, medications, and discharge instructions  Description: Complete learning assessment and assess knowledge base    Interventions:  - Provide teaching at level of understanding  - Provide teaching via preferred learning methods  Outcome: Progressing     Problem: Prexisting or High Potential for Compromised Skin Integrity  Goal: Skin integrity is maintained or improved  Description: INTERVENTIONS:  - Identify patients at risk for skin breakdown  - Assess and monitor skin integrity  - Assess and monitor nutrition and hydration status  - Monitor labs   - Assess for incontinence   - Turn and reposition patient  - Assist with mobility/ambulation  - Relieve pressure over bony prominences  - Avoid friction and shearing  - Provide appropriate hygiene as needed including keeping skin clean and dry  - Evaluate need for skin moisturizer/barrier cream  - Collaborate with interdisciplinary team   - Patient/family teaching  - Consider wound care consult   Outcome: Progressing

## 2022-11-01 NOTE — PLAN OF CARE
Problem: OCCUPATIONAL THERAPY ADULT  Goal: Performs self-care activities at highest level of function for planned discharge setting  See evaluation for individualized goals  Description: Treatment Interventions: ADL retraining, Functional transfer training, UE strengthening/ROM, Endurance training, Patient/family training, Equipment evaluation/education, Neuromuscular reeducation, Compensatory technique education, Continued evaluation, Energy conservation, Activityengagement          See flowsheet documentation for full assessment, interventions and recommendations  Note: Limitation: Decreased ADL status, Decreased UE strength, Decreased Safe judgement during ADL, Decreased endurance, Decreased self-care trans, Decreased high-level ADLs  Prognosis: Good  Assessment: Pt is a 70 y o  female, admitted to [address] [place] [address] 10/30/2022 d/t experiencing L ankle pain after experiencing a fall  Dx: closed fracture dislocation of L ankle  Pt underwent surgical intervention on 10/31/2022 - OPEN REDUCTION W/ INTERNAL FIXATION (ORIF) ANKLE (Left)  Pt is currently NWB LLE  Pt with PMHx impacting their performance during ADL tasks, including: hyperlipidemia, hypothyroid, parkinsons  Prior to admission to the hospital Pt was performing ADLs without physical assistance  IADLs without physical assistance  Functional transfers/ambulation without physical assistance  Cognitive status was PTA was intact  OT order placed to assess Pt's ADLs, cognitive status, and performance during functional tasks in order to maximize safety and independence while making most appropriate d/c recommendations   Pt's clinical presentation is currently unstable/unpredictable given new onset deficits that effect Pt's occupational performance and ability to safely return to OF including decrease activity tolerance, decrease standing balance, decrease performance during ADL tasks, decrease safety awareness , decrease UB MS, increased pain, decrease generalized strength, decrease activity engagement, decrease performance during functional transfers, high fall risk and limited insight to deficits combined with medical complications of hypertension , pain impacting overall mobility status, edema/swelling, decreased skin integrity, incontinence, need for input for mobility technique/safety and new NWB status LLE  Personal factors affecting Pt at time of initial evaluation include: inaccessible home environment, step(s) to enter environment, availability as recommended, past experience, inability to perform current job functions, inability to perform IADLs, inability to perform ADLs, new need for AD, inability to ambulate household distances, inability to navigate community distances, limited insight into impairments, decreased initiation and engagement and recent fall(s)/fall history  Pt will benefit from continued skilled OT services to address deficits as defined above and to maximize level independence/participation during ADLs and functional tasks to facilitate return toward PLOF and improved quality of life  From an occupational therapy standpoint, recommendation at time of d/c would be post acute rehab       OT Discharge Recommendation: Post acute rehabilitation services

## 2022-11-01 NOTE — ASSESSMENT & PLAN NOTE
· Presents with left ankle pain and swelling after fall down 2 cement steps twisting left ankle, unable to walk after    Not on anticoagulation  · X-ray with closed fracture and dislocation of left ankle  · Ortho consult  · PT/OT consult

## 2022-11-01 NOTE — PLAN OF CARE
Problem: PHYSICAL THERAPY ADULT  Goal: Performs mobility at highest level of function for planned discharge setting  See evaluation for individualized goals  Description: Treatment/Interventions: ADL retraining, Functional transfer training, LE strengthening/ROM, Elevations, Therapeutic exercise, Endurance training, Patient/family training, Equipment eval/education, Bed mobility, Gait training, Compensatory technique education, Spoke to nursing, Spoke to case management, OT  Equipment Recommended:  (TBD by rehab)       See flowsheet documentation for full assessment, interventions and recommendations  Note: Prognosis: Good  Problem List: Decreased strength, Decreased endurance, Impaired balance, Decreased mobility, Decreased safety awareness, Obesity, Decreased skin integrity, Orthopedic restrictions, Pain  Assessment: Pt is a 70 y o  female seen for PT evaluation s/p admission to [address] [place] [address] on 10/30/2022 with Closed fracture dislocation of left ankle  Order placed for PT services    Upon evaluation: Pt is presenting with impaired functional mobility due to pain, decreased strength, decreased endurance, impaired balance, gait deviations, decreased safety awareness, orthopedic restrictions, fall risk, LE edema, and impaired skin integrity requiring minimal assistance for bed mobility, minimal assistance for transfers and unable to hop to ambulate with RW  Pt's clinical presentation is currently unpredictable given the functional mobility deficits above, especially weakness, edema of extremities, decreased skin integrity, decreased endurance, gait deviations, pain, decreased activity tolerance, decreased functional mobility tolerance and decreased safety awareness, coupled with fall risks as indicated by AM-PAC 6-Clicks: 86/85 as well as hx of falls, impaired balance, polypharmacy and decreased safety awareness and combined with medical complications of hypertension , pain impacting overall mobility status, abnormal CBC, abnormal blood sugars and need for input for mobility technique/safety  Pt's PMHx and comorbidities that may affect physical performance and progress include: COPD and Parkinson's Disease  Personal factors affecting pt at time of IE include: inaccessible home environment, step(s) to enter environment, inability to perform IADLs, inability to perform ADLs, inability to navigate level surfaces without external assistance, inability to ambulate household distances and recent fall(s)/fall history  Pt will benefit from continued skilled PT services to address deficits as defined above and to maximize level of functional mobility to facilitate return toward PLOF and improved QOL  From PT/mobility standpoint, recommendation at time of d/c would be Short term rehab pending progress in order to reduce fall risk and maximize pt's functional independence and consistency with mobility in order to facilitate return to PLOF  Recommend trial with walker next 1-2 sessions and ther ex next 1-2 sessions  Barriers to Discharge: Decreased caregiver support, Inaccessible home environment  Barriers to Discharge Comments: requires assistance to complete mobility, has 18 steps to access apartment  PT Discharge Recommendation: Post acute rehabilitation services (consider acute inpatient rehab)    See flowsheet documentation for full assessment

## 2022-11-01 NOTE — PLAN OF CARE
Problem: Potential for Falls  Goal: Patient will remain free of falls  Description: INTERVENTIONS:  - Educate patient/family on patient safety including physical limitations  - Instruct patient to call for assistance with activity   - Consult OT/PT to assist with strengthening/mobility   - Keep Call bell within reach  - Keep bed low and locked with side rails adjusted as appropriate  - Keep care items and personal belongings within reach  - Initiate and maintain comfort rounds  - Make Fall Risk Sign visible to staff  - Offer Toileting every 2  Hours, in advance of need  - Initiate/Maintain bed alarm  - Apply yellow socks and bracelet for high fall risk patients  - Consider moving patient to room near nurses station  Outcome: Progressing     Problem: MOBILITY - ADULT  Goal: Maintain or return to baseline ADL function  Description: INTERVENTIONS:  -  Assess patient's ability to carry out ADLs; assess patient's baseline for ADL function and identify physical deficits which impact ability to perform ADLs (bathing, care of mouth/teeth, toileting, grooming, dressing, etc )  - Assess/evaluate cause of self-care deficits   - Assess range of motion  - Assess patient's mobility; develop plan if impaired  - Assess patient's need for assistive devices and provide as appropriate  - Encourage maximum independence but intervene and supervise when necessary  - Involve family in performance of ADLs  - Assess for home care needs following discharge   - Consider OT consult to assist with ADL evaluation and planning for discharge  - Provide patient education as appropriate  Outcome: Progressing  Goal: Maintains/Returns to pre admission functional level  Description: INTERVENTIONS:  - Perform BMAT or MOVE assessment daily    - Set and communicate daily mobility goal to care team and patient/family/caregiver  - Collaborate with rehabilitation services on mobility goals if consulted  - Perform Range of Motion 3 times a day    - Reposition patient every 3 hours    - Dangle patient 3 times a day  - Stand patient 3 times a day  - Ambulate patient 3 times a day  - Out of bed to chair 3 times a day   - Out of bed for meals 3 times a day  - Out of bed for toileting  - Record patient progress and toleration of activity level   Outcome: Progressing     Problem: PAIN - ADULT  Goal: Verbalizes/displays adequate comfort level or baseline comfort level  Description: Interventions:  - Encourage patient to monitor pain and request assistance  - Assess pain using appropriate pain scale  - Administer analgesics based on type and severity of pain and evaluate response  - Implement non-pharmacological measures as appropriate and evaluate response  - Consider cultural and social influences on pain and pain management  - Notify physician/advanced practitioner if interventions unsuccessful or patient reports new pain  Outcome: Progressing     Goal: Maintain or return to baseline ADL function  Description: INTERVENTIONS:  -  Assess patient's ability to carry out ADLs; assess patient's baseline for ADL function and identify physical deficits which impact ability to perform ADLs (bathing, care of mouth/teeth, toileting, grooming, dressing, etc )  - Assess/evaluate cause of self-care deficits   - Assess range of motion  - Assess patient's mobility; develop plan if impaired  - Assess patient's need for assistive devices and provide as appropriate  - Encourage maximum independence but intervene and supervise when necessary  - Involve family in performance of ADLs  - Assess for home care needs following discharge   - Consider OT consult to assist with ADL evaluation and planning for discharge  - Provide patient education as appropriate  Outcome: Progressing     Problem: DISCHARGE PLANNING  Goal: Discharge to home or other facility with appropriate resources  Description: INTERVENTIONS:  - Identify barriers to discharge w/patient and caregiver  - Arrange for needed discharge resources and transportation as appropriate  - Identify discharge learning needs (meds, wound care, etc )  - Arrange for interpretive services to assist at discharge as needed  - Refer to Case Management Department for coordinating discharge planning if the patient needs post-hospital services based on physician/advanced practitioner order or complex needs related to functional status, cognitive ability, or social support system  Outcome: Progressing     Problem: Knowledge Deficit  Goal: Patient/family/caregiver demonstrates understanding of disease process, treatment plan, medications, and discharge instructions  Description: Complete learning assessment and assess knowledge base    Interventions:  - Provide teaching at level of understanding  - Provide teaching via preferred learning methods  Outcome: Progressing     Problem: Prexisting or High Potential for Compromised Skin Integrity  Goal: Skin integrity is maintained or improved  Description: INTERVENTIONS:  - Identify patients at risk for skin breakdown  - Assess and monitor skin integrity  - Assess and monitor nutrition and hydration status  - Monitor labs   - Assess for incontinence   - Turn and reposition patient  - Assist with mobility/ambulation  - Relieve pressure over bony prominences  - Avoid friction and shearing  - Provide appropriate hygiene as needed including keeping skin clean and dry  - Evaluate need for skin moisturizer/barrier cream  - Collaborate with interdisciplinary team   - Patient/family teaching  - Consider wound care consult   Outcome: Progressing

## 2022-11-01 NOTE — ASSESSMENT & PLAN NOTE
· Patient has had frequent urination for "quite some time"   · Was recently put on oxybutynin - will now continue as inpatient (was not listed on med rec)  · Was to have appointment with urogynegology however was admitted to hospital   UA negative for UTI   · Encourage follow up with urogynecology as outpatient

## 2022-11-01 NOTE — ASSESSMENT & PLAN NOTE
· Pain, swelling inability to walk follow-up fall  · X-ray with dislocation and closed fracture left ankle (official read not available)  · ED left ankle reduced and splinted  · Pain control  · Orthopedic consult  · Bimalleolar fracture subluxation left ankle   · POD 1 - ORIF left ankle  · Neurovascular checks  · PT/OT consult pending

## 2022-11-02 LAB
ANION GAP SERPL CALCULATED.3IONS-SCNC: 4 MMOL/L (ref 4–13)
BUN SERPL-MCNC: 23 MG/DL (ref 5–25)
CALCIUM SERPL-MCNC: 7.8 MG/DL (ref 8.3–10.1)
CHLORIDE SERPL-SCNC: 99 MMOL/L (ref 96–108)
CO2 SERPL-SCNC: 33 MMOL/L (ref 21–32)
CREAT SERPL-MCNC: 0.99 MG/DL (ref 0.6–1.3)
ERYTHROCYTE [DISTWIDTH] IN BLOOD BY AUTOMATED COUNT: 12.3 % (ref 11.6–15.1)
GFR SERPL CREATININE-BSD FRML MDRD: 57 ML/MIN/1.73SQ M
GLUCOSE SERPL-MCNC: 121 MG/DL (ref 65–140)
HCT VFR BLD AUTO: 32.1 % (ref 34.8–46.1)
HGB BLD-MCNC: 10.1 G/DL (ref 11.5–15.4)
MCH RBC QN AUTO: 29.6 PG (ref 26.8–34.3)
MCHC RBC AUTO-ENTMCNC: 31.5 G/DL (ref 31.4–37.4)
MCV RBC AUTO: 94 FL (ref 82–98)
PLATELET # BLD AUTO: 203 THOUSANDS/UL (ref 149–390)
PMV BLD AUTO: 9.8 FL (ref 8.9–12.7)
POTASSIUM SERPL-SCNC: 4.2 MMOL/L (ref 3.5–5.3)
RBC # BLD AUTO: 3.41 MILLION/UL (ref 3.81–5.12)
SODIUM SERPL-SCNC: 136 MMOL/L (ref 135–147)
WBC # BLD AUTO: 9.74 THOUSAND/UL (ref 4.31–10.16)

## 2022-11-02 RX ADMIN — Medication 1000 UNITS: at 08:55

## 2022-11-02 RX ADMIN — EZETIMIBE 10 MG: 10 TABLET ORAL at 21:41

## 2022-11-02 RX ADMIN — OXYBUTYNIN CHLORIDE 5 MG: 5 TABLET ORAL at 16:47

## 2022-11-02 RX ADMIN — ACETAMINOPHEN 975 MG: 325 TABLET ORAL at 05:40

## 2022-11-02 RX ADMIN — CARBIDOPA AND LEVODOPA 1 TABLET: 25; 100 TABLET ORAL at 08:54

## 2022-11-02 RX ADMIN — CARBIDOPA AND LEVODOPA 1 TABLET: 25; 100 TABLET ORAL at 16:46

## 2022-11-02 RX ADMIN — ASPIRIN 81 MG CHEWABLE TABLET 81 MG: 81 TABLET CHEWABLE at 08:54

## 2022-11-02 RX ADMIN — ENOXAPARIN SODIUM 40 MG: 40 INJECTION SUBCUTANEOUS at 08:54

## 2022-11-02 RX ADMIN — PIMAVANSERIN TARTRATE 34 MG: 34 CAPSULE ORAL at 21:42

## 2022-11-02 RX ADMIN — METOPROLOL TARTRATE 25 MG: 25 TABLET, FILM COATED ORAL at 08:54

## 2022-11-02 RX ADMIN — SENNOSIDES, DOCUSATE SODIUM 1 TABLET: 8.6; 5 TABLET ORAL at 08:54

## 2022-11-02 RX ADMIN — LEVOTHYROXINE SODIUM 50 MCG: 50 TABLET ORAL at 05:40

## 2022-11-02 RX ADMIN — GLYCERIN, HYPROMELLOSE, POLYETHYLENE GLYCOL 1 DROP: .2; .2; 1 LIQUID OPHTHALMIC at 08:59

## 2022-11-02 RX ADMIN — PANTOPRAZOLE SODIUM 20 MG: 20 TABLET, DELAYED RELEASE ORAL at 05:40

## 2022-11-02 RX ADMIN — PRAVASTATIN SODIUM 20 MG: 20 TABLET ORAL at 16:46

## 2022-11-02 RX ADMIN — OXYBUTYNIN CHLORIDE 5 MG: 5 TABLET ORAL at 08:54

## 2022-11-02 RX ADMIN — SENNOSIDES, DOCUSATE SODIUM 1 TABLET: 8.6; 5 TABLET ORAL at 16:46

## 2022-11-02 RX ADMIN — CARBIDOPA AND LEVODOPA 1 TABLET: 25; 100 TABLET ORAL at 21:41

## 2022-11-02 RX ADMIN — ACETAMINOPHEN 975 MG: 325 TABLET ORAL at 13:56

## 2022-11-02 RX ADMIN — ACETAMINOPHEN 975 MG: 325 TABLET ORAL at 21:41

## 2022-11-02 NOTE — ASSESSMENT & PLAN NOTE
· Pain, swelling inability to walk follow-up fall  · X-ray with dislocation and closed fracture left ankle (official read not available)  · ED left ankle reduced and splinted  · Pain control  · Orthopedic consult  · Bimalleolar fracture subluxation left ankle   · POD 2 - ORIF left ankle  · NWB LLE   · Neurovascular checks  · PT/OT consult -acute rehab  · DVT prophylaxis Lovenox while inpatient, transition to  b i d   If discharged home total 21 days postop

## 2022-11-02 NOTE — OCCUPATIONAL THERAPY NOTE
Occupational Therapy Progress Note     Patient Name: Bry Mckeon  ZJRIN'U Date: 11/2/2022  Problem List  Principal Problem:    Closed fracture dislocation of left ankle  Active Problems:    Acquired hypothyroidism    Gastro-esophageal reflux disease without esophagitis    Fall down steps    Parkinson's disease ([facility] [address] )    Hypertensive urgency    Frequent urination        11/02/22 1117   Note Type   Note Type Treatment   Pain Assessment   Pain Assessment Tool 0-10   Pain Score 2   Pain Location/Orientation Orientation: Left; Location: Foot   Pain Onset/Description Descriptor: Burning   Restrictions/Precautions   Weight Bearing Precautions Per Order Yes   LLE Weight Bearing Per Order NWB   Braces or Orthoses Splint   Other Precautions Chair Alarm; Bed Alarm; Fall Risk;WBS   ADL   LB Dressing Assistance 1  Total Assistance   LB Dressing Deficit Setup; Requires assistive device for steadying;Verbal cueing;Supervision/safety; Increased time to complete; Don/doff L sock; Don/doff R sock; Thread LLE into underwear; Thread RLE into underwear;Pull up over hips   Toileting Assistance  1  Total Assistance   Toileting Deficit Setup;Supervison/safety; Increased time to complete;Perineal hygiene;Clothing management down;Clothing management up   Bed Mobility   Supine to Sit 4  Minimal assistance   Additional items Assist x 1; Increased time required;Verbal cues;LE management   Additional Comments Pt supine in bed at beginning of session  Supine to sit @ Min A for LLE management  Transfers   Sit to Stand 4  Minimal assistance   Additional items Assist x 1; Increased time required;Verbal cues   Stand to Sit 4  Minimal assistance   Additional items Assist x 1; Increased time required;Verbal cues   Stand pivot 4  Minimal assistance   Additional items Assist x 1; Increased time required;Verbal cues   Toilet transfer 4  Minimal assistance   Additional items Assist x 1; Increased time required;Verbal cues; Commode   Additional Comments STS from EOB to RW @ Min A  SPT > BSC > recliner chair @ Min A with RW  Pt seated in chair at end of session with chair alarm intact, call bell within reach and all needs met  Cognition   Overall Cognitive Status WFL   Arousal/Participation Alert; Responsive; Cooperative   Attention Attends with cues to redirect   Orientation Level Oriented X4   Memory Within functional limits   Following Commands Follows one step commands without difficulty   Activity Tolerance   Activity Tolerance Patient limited by pain   Assessment   Assessment Pt completed OT tx session #1 focused on ADL performance and functional mobility  Pt alert and agreeable to participate  Pt continuing to require increased assistance for functional transfers and ADL tasks at this time d/t pain, WBS, decreased standing balance/tolerance, decreased activity tolerance and decreased endurance  Pt currently performing UB ADLs @ S/set-up while seated, LB ADLs @ Total A and stand pivot transfers with RW @ Min A  Pt demonstrated Good performance and Good potential to acheive goals  Pt's parkinson's diagnosis is playing a role in pt's mobility and performance during functional tasks and pt would benefit from intensive rehab to address said deficits  Continue to recommend post acute rehabilitation services at this time to increase safety and independence in ADL tasks and functional mobility     Plan   Treatment Interventions ADL retraining;Functional transfer training   Goal Expiration Date 11/15/22   OT Treatment Day 2   OT Frequency 3-5x/wk   Recommendation   OT Discharge Recommendation Post acute rehabilitation services   AM-PAC Daily Activity Inpatient   Lower Body Dressing 1   Bathing 2   Toileting 1   Upper Body Dressing 3   Grooming 3   Eating 4   Daily Activity Raw Score 14   Daily Activity Standardized Score (Calc for Raw Score >=11) 33 39   AM-PAC Applied Cognition Inpatient   Following a Speech/Presentation 4   Understanding Ordinary Conversation 4   Taking Medications 4   Remembering Where Things Are Placed or Put Away 4   Remembering List of 4-5 Errands 4   Taking Care of Complicated Tasks 4   Applied Cognition Raw Score 24   Applied Cognition Standardized Score 62 21        Pt goals to be met by 11/15/2022     1  Pt will demonstrate ability to complete LB dressing @ S in order to increase safety and independence during meaningful tasks  2  Pt will demonstrate ability to nadia/doff socks/shoes while sitting EOB @ S in order to increase safety and independence during meaningful tasks  3  Pt will demonstrate ability to complete toileting tasks including CM and pericare @ S in order to increase safety and independence during meaningful tasks  4  Pt will demonstrate ability to complete EOB, chair, toilet/commode transfers @ S in order to increase performance and participation during functional tasks  5  Pt will demonstrate ability to stand for 4-5 minutes while maintaining F+ balance with use of RW for UB support PRN  6  Pt will demonstrate ability to tolerate 30-35 minute OT session with no vc'ing for deep breathing or use of energy conservation techniques in order to increase activity tolerance during functional tasks  7  Pt will demonstrate Good carryover of use of energy conservation/compensatory strategies during ADLs and functional tasks in order to increase safety and reduce risk for falls  8  Pt will demonstrate Good attention and participation in continued evaluation of functional ambulation house hold distances in order to assist with safe d/c planning  9  Pt will attend to continued cognitive assessments 100% of the time in order to provide most appropriate d/c recommendations  10  Pt will follow 100% simple 2-step commands and be A&O x4 consistently with environmental cues to increase participation in functional activities     11  Pt will identify 3 areas of interest/hobbies and 1 intervention on how to incorporate into daily life in order to increase interaction with environment and peers as well as increase participation in meaningful tasks  12  Pt will demonstrate 100% carryover of BUE HEP in order to increase BUE MS and increase performance during functional tasks upon d/c home      Mandi Amin OTR/L

## 2022-11-02 NOTE — CASE MANAGEMENT
Support Center received request for authorization from Care Manager  Authorization request for: [address] Name: [facility] Acute Rehab  NPI: [id number]  Facility MD:  Dr Danyelle Yung:  [phone]  Authorization initiated by contacting: Lilli Miranda Via: Fax  Clinicals submitted via:  Fax [phone]

## 2022-11-02 NOTE — PLAN OF CARE
Problem: OCCUPATIONAL THERAPY ADULT  Goal: Performs self-care activities at highest level of function for planned discharge setting  See evaluation for individualized goals  Description: Treatment Interventions: ADL retraining, Functional transfer training, UE strengthening/ROM, Endurance training, Patient/family training, Equipment evaluation/education, Neuromuscular reeducation, Compensatory technique education, Continued evaluation, Energy conservation, Activityengagement          See flowsheet documentation for full assessment, interventions and recommendations  Note: Limitation: Decreased ADL status, Decreased UE strength, Decreased Safe judgement during ADL, Decreased endurance, Decreased self-care trans, Decreased high-level ADLs  Prognosis: Good  Assessment: Pt completed OT tx session #1 focused on ADL performance and functional mobility  Pt alert and agreeable to participate  Pt continuing to require increased assistance for functional transfers and ADL tasks at this time d/t pain, WBS, decreased standing balance/tolerance, decreased activity tolerance and decreased endurance  Pt currently performing UB ADLs @ S/set-up while seated, LB ADLs @ Total A and stand pivot transfers with RW @ Min A  Pt demonstrated Good performance and Good potential to acheive goals  Pt's parkinson's diagnosis is playing a role in pt's mobility and performance during functional tasks and pt would benefit from intensive rehab to address said deficits  Continue to recommend post acute rehabilitation services at this time to increase safety and independence in ADL tasks and functional mobility       OT Discharge Recommendation: Post acute rehabilitation services

## 2022-11-02 NOTE — PROGRESS NOTES
[address]  Progress Note Tonya Fallon 1950, 70 y o  female MRN: [id number]  Unit/Bed#: -01 Encounter: [id number]  Primary Care Provider: Jose Law DO   Date and time admitted to hospital: 10/30/2022  1:29 PM    * Closed fracture dislocation of left ankle  Assessment & Plan  · Pain, swelling inability to walk follow-up fall  · X-ray with dislocation and closed fracture left ankle (official read not available)  · ED left ankle reduced and splinted  · Pain control  · Orthopedic consult  · Bimalleolar fracture subluxation left ankle   · POD 2 - ORIF left ankle  · NWB LLE   · Neurovascular checks  · PT/OT consult -acute rehab  · DVT prophylaxis Lovenox while inpatient, transition to  b i d  If discharged home total 21 days postop    Fall down steps  Assessment & Plan  · Presents with left ankle pain and swelling after fall down 2 cement steps twisting left ankle, unable to walk after    Not on anticoagulation  · X-ray with closed fracture and dislocation of left ankle  · Ortho consult  · PT/OT consult :  Recommending rehab> [facility] pending auth      Frequent urination  Assessment & Plan  · Patient has had frequent urination for "quite some time"   · Was recently put on oxybutynin - will now continue as inpatient (was not listed on med rec)  · Was to have appointment with urogynegology however was admitted to hospital   UA negative for UTI   · Encourage follow up with urogynecology as outpatient     Hypertensive urgency  Assessment & Plan  · Noted intermittent 180s-190s in ED and on arrival to unit  · PTA Metoprolol tartrate 25 mg daily  · PRN pain control  · Prn lopressor 5mg    Resolved - BP controlled with home medications      Parkinson's disease ([facility] [address] )  Assessment & Plan  · Continue PTA Sinemet    Gastro-esophageal reflux disease without esophagitis  Assessment & Plan  · PTA omeprazole    Acquired hypothyroidism  Assessment & Plan  · Continue PTA levothyroxine  · reports frequent dry mouth and voiding  · TSH wnl   · HgbA1C - prediabetic range - counseled         VTE Pharmacologic Prophylaxis: VTE Score: 9 High Risk (Score >/= 5) - Pharmacological DVT Prophylaxis Ordered: enoxaparin (Lovenox)  Sequential Compression Devices Ordered  Patient Centered Rounds: I performed bedside rounds with nursing staff today  Discussions with Specialists or Other Care Team Provider: SWETA    Education and Discussions with Family / Patient: Updated  () via phone  Time Spent for Care: 45 minutes  More than 50% of total time spent on counseling and coordination of care as described above  Current Length of Stay: 2 day(s)  Current Patient Status: Inpatient   Certification Statement: The patient will continue to require additional inpatient hospital stay due to medical optomization pending rehab placement and authorization  Discharge Plan: Anticipate discharge in 24-48 hrs to rehab facility  Code Status: Level 3 - DNAR and DNI    Subjective:   Resting comfortably denies any complaints overnight pain controlled with p r n  Objective:     Vitals:   Temp (24hrs), Av 6 °F (36 4 °C), Min:97 3 °F (36 3 °C), Max:97 9 °F (36 6 °C)    Temp:  [97 3 °F (36 3 °C)-97 9 °F (36 6 °C)] 97 9 °F (36 6 °C)  HR:  [62-66] 62  Resp:  [18] 18  BP: (117-144)/(63-93) 117/63  SpO2:  [96 %-98 %] 97 %  Body mass index is 38 26 kg/m²  Input and Output Summary (last 24 hours): Intake/Output Summary (Last 24 hours) at 2022 1614  Last data filed at 2022 1413  Gross per 24 hour   Intake 420 ml   Output 350 ml   Net 70 ml       Physical Exam:   Physical Exam  Vitals and nursing note reviewed  Constitutional:       General: She is not in acute distress  Appearance: She is well-developed  HENT:      Head: Normocephalic and atraumatic        Mouth/Throat:      Mouth: Mucous membranes are moist    Eyes:      Conjunctiva/sclera: Conjunctivae normal  Pupils: Pupils are equal, round, and reactive to light  Cardiovascular:      Rate and Rhythm: Normal rate and regular rhythm  Pulses: Normal pulses  Heart sounds: Normal heart sounds  No murmur heard  Pulmonary:      Effort: Pulmonary effort is normal  No respiratory distress  Breath sounds: Normal breath sounds  Abdominal:      General: Bowel sounds are normal  There is no distension  Palpations: Abdomen is soft  Tenderness: There is no abdominal tenderness  Musculoskeletal:      Cervical back: Neck supple  Comments: LLE splint intact, NV intact toes wiggle, warm <3 cap refill     Skin:     General: Skin is warm and dry  Capillary Refill: Capillary refill takes less than 2 seconds  Neurological:      General: No focal deficit present  Mental Status: She is alert            Additional Data:     Labs:  Results from last 7 days   Lab Units 11/02/22  0446   WBC Thousand/uL 9 74   HEMOGLOBIN g/dL 10 1*   HEMATOCRIT % 32 1*   PLATELETS Thousands/uL 203     Results from last 7 days   Lab Units 11/02/22  0446   SODIUM mmol/L 136   POTASSIUM mmol/L 4 2   CHLORIDE mmol/L 99   CO2 mmol/L 33*   BUN mg/dL 23   CREATININE mg/dL 0 99   ANION GAP mmol/L 4   CALCIUM mg/dL 7 8*   GLUCOSE RANDOM mg/dL 121             Results from last 7 days   Lab Units 10/30/22  1829   HEMOGLOBIN A1C % 5 9*           Lines/Drains:  Invasive Devices  Report    Peripheral Intravenous Line  Duration           Peripheral IV 10/30/22 Right Antecubital 3 days          Drain  Duration           External Urinary Catheter 1 day                      Imaging: Reviewed radiology reports from this admission including: xray(s)    Recent Cultures (last 7 days):         Last 24 Hours Medication List:   Current Facility-Administered Medications   Medication Dose Route Frequency Provider Last Rate   • acetaminophen  975 mg Oral [facility] Belkis Nugent PA-C     • aspirin  81 mg Oral Daily Belkis Nugent PA-C     • carbidopa-levodopa  1 tablet Oral TID Kendra Cerrato PA-C     • cholecalciferol  1,000 Units Oral Daily Kendra Cerrato PA-C     • enoxaparin  40 mg Subcutaneous Daily Kendra Cerrato PA-C     • ezetimibe  10 mg Oral HS Kendra NONA Cerrato     • glycerin-hypromellose-  1 drop Both Eyes Q4H PRN Yessica NONA Lama     • lactated ringers  125 mL/hr Intravenous Continuous Kendra Cerrato PA-C Stopped (11/01/22 2112)   • levothyroxine  50 mcg Oral Early Morning Kendra Cerrato PA-C     • metoprolol  5 mg Intravenous Q8H PRN Kendra Cerrato PA-C     • metoprolol tartrate  25 mg Oral Daily Kendra Cerrato PA-C     • ondansetron  4 mg Intravenous Q6H PRN Kendra Cerrato PA-C     • oxybutynin  5 mg Oral BID Yessica NONA Lama     • oxyCODONE  10 mg Oral Q4H PRN Kendra Cerrato PA-C     • oxyCODONE  5 mg Oral Q4H PRN Kendra Cerrato PA-C     • pantoprazole  20 mg Oral Early Morning Kendra Cerrato PA-C     • Pimavanserin Tartrate  34 mg Oral HS Kendra Cerrato PA-C     • polyethylene glycol  17 g Oral Daily PRN Kendra Cerrato PA-C     • pravastatin  20 mg Oral Daily With Lucas Nichols PA-C     • senna-docusate sodium  1 tablet Oral BID Kendra Cerrato PA-C          Today, Patient Was Seen By: Erlin Felton    **Please Note: This note may have been constructed using a voice recognition system  **

## 2022-11-02 NOTE — ASSESSMENT & PLAN NOTE
· Presents with left ankle pain and swelling after fall down 2 cement steps twisting left ankle, unable to walk after    Not on anticoagulation  · X-ray with closed fracture and dislocation of left ankle  · Ortho consult  · PT/OT consult :  Recommending rehab> [facility] pending auth

## 2022-11-02 NOTE — PLAN OF CARE
Problem: PHYSICAL THERAPY ADULT  Goal: Performs mobility at highest level of function for planned discharge setting  See evaluation for individualized goals  Description: Treatment/Interventions: ADL retraining, Functional transfer training, LE strengthening/ROM, Elevations, Therapeutic exercise, Endurance training, Patient/family training, Equipment eval/education, Bed mobility, Gait training, Compensatory technique education, Spoke to nursing, Spoke to case management, OT  Equipment Recommended:  (TBD by rehab)       See flowsheet documentation for full assessment, interventions and recommendations  Outcome: Not Progressing  Note: Prognosis: Good  Problem List: Decreased strength, Decreased endurance, Impaired balance, Decreased mobility, Decreased safety awareness, Obesity, Decreased skin integrity, Orthopedic restrictions, Pain  Assessment: Pt seen for PT treatment session this date with interventions consisting of Therapeutic exercise consisting of: AROM and AAROM 20 reps B LE in supine position and therapeutic activity consisting of training: bed mobility, supine<>sit transfers, sit<>stand transfers, static sitting tolerance at EOB for 8 minutes w/ min UE support, static standing tolerance for 1 minutes w/ B UE support, vc and tactile cues for static sitting posture faciliation and vc and tactile cues for static standing posture faciliation  Pt agreeable to PT treatment session upon arrival, pt found supine in bed w/ HOB elevated, in no apparent distress, A&O x 4 and responsive  In comparison to previous session, pt with no improvements as evidenced by patient unable to hop on RLE  Molly Walshvíctor Post session: pt returned BTB, bed alarm engaged, all needs in reach and RN notified of session findings/recommendations  Continue to recommend post acute rehabilitation services at time of d/c in order to maximize pt's functional independence and safety w/ mobility   Pt continues to be functioning below baseline level, and remains limited 2* factors listed above and including Parkinson's diagnosis affecting mobility and activity tolerance  PT will continue to see pt during current hospitalization in order to address the deficits listed above and provide interventions consistent w/ POC in effort to achieve STGs  Barriers to Discharge: Inaccessible home environment, Decreased caregiver support  Barriers to Discharge Comments: pt has 18 steps to enter apartment  PT Discharge Recommendation: Post acute rehabilitation services    See flowsheet documentation for full assessment

## 2022-11-02 NOTE — PHYSICAL THERAPY NOTE
PHYSICAL THERAPY TREATMENT NOTE  NAME:  Dana Pozo  DATE: 11/02/22    Length Of Stay: 2  Performed at least 2 patient identifiers during session: Name and ID bracelet    TREATMENT NOTE:     11/02/22 1050   PT Last Visit   PT Visit Date 11/02/22   Note Type   Note Type Treatment   Pain Assessment   Pain Assessment Tool 0-10   Pain Score 2   Pain Location/Orientation Orientation: Left; Location: Foot   Pain Onset/Description Descriptor: Burning   Patient's Stated Pain Goal No pain   Hospital Pain Intervention(s) Repositioned;Elevated; Emotional support   Multiple Pain Sites No   Restrictions/Precautions   Weight Bearing Precautions Per Order Yes   LLE Weight Bearing Per Order NWB   Braces or Orthoses Splint   Other Precautions Chair Alarm; Bed Alarm; Fall Risk;WBS   General   Chart Reviewed Yes   Family/Caregiver Present No   Cognition   Overall Cognitive Status WFL   Arousal/Participation Alert; Responsive; Cooperative   Attention Attends with cues to redirect   Orientation Level Oriented X4   Memory Within functional limits   Following Commands Follows one step commands without difficulty   Comments pt pleasant and cooperative   Bed Mobility   Rolling R 4  Minimal assistance   Additional items Assist x 1; Increased time required;Verbal cues; Bedrails   Rolling L 4  Minimal assistance   Additional items Assist x 1;Bedrails; Increased time required;Verbal cues   Supine to Sit 4  Minimal assistance   Additional items Assist x 1;Bedrails; Increased time required;Verbal cues;LE management   Sit to Supine 3  Moderate assistance   Additional items Assist x 1;Bedrails; Increased time required;Verbal cues;LE management   Additional Comments verbal cues required for proper sequencing and body mechanics   Transfers   Sit to Stand 4  Minimal assistance   Additional items Assist x 1;Bedrails; Increased time required;Verbal cues   Stand to Sit 4  Minimal assistance   Additional items Assist x 1; Increased time required; Bedrails;Verbal cues   Additional Comments pt able to stand at EOB with [employer] RW for approx 1 minute, patient able to maintain NWB throughout  Pt request back to bed due to increased fatigue, patient reporting increased fatigue due to her Parkinson's diagnosis   Balance   Static Sitting Normal   Dynamic Sitting Good   Static Standing Fair   Dynamic Standing Fair -   Endurance Deficit   Endurance Deficit Yes   Endurance Deficit Description pt fatigues easily   Activity Tolerance   Activity Tolerance Patient limited by fatigue;Patient limited by pain   Nurse Made Aware RN   Exercises   Quad Sets Supine;20 reps;AROM; Bilateral   Heelslides Supine;20 reps;AROM; Bilateral   Glute Sets Supine;20 reps;AROM; Bilateral   Hip Flexion Supine;20 reps;AROM; Right;AAROM; Left   Hip Abduction Supine;20 reps;AROM; Bilateral   Hip Adduction Supine;20 reps;AROM; Bilateral   Knee AROM Short Arc Quad Supine;20 reps;AROM; Bilateral   Ankle Pumps Supine;20 reps;AROM; Right   Balance training  sitting balance activities seated at EOB   Assessment   Prognosis Good   Problem List Decreased strength;Decreased endurance; Impaired balance;Decreased mobility; Decreased safety awareness; Obesity; Decreased skin integrity;Orthopedic restrictions;Pain   Assessment Pt seen for PT treatment session this date with interventions consisting of Therapeutic exercise consisting of: AROM and AAROM 20 reps B LE in supine position and therapeutic activity consisting of training: bed mobility, supine<>sit transfers, sit<>stand transfers, static sitting tolerance at EOB for 8 minutes w/ min UE support, static standing tolerance for 1 minutes w/ B UE support, vc and tactile cues for static sitting posture faciliation and vc and tactile cues for static standing posture faciliation  Pt agreeable to PT treatment session upon arrival, pt found supine in bed w/ HOB elevated, in no apparent distress, A&O x 4 and responsive   In comparison to previous session, pt with no improvements as evidenced by patient unable to hop on RLE  Riya Naas Post session: pt returned BTB, bed alarm engaged, all needs in reach and RN notified of session findings/recommendations  Continue to recommend post acute rehabilitation services at time of d/c in order to maximize pt's functional independence and safety w/ mobility  Pt continues to be functioning below baseline level, and remains limited 2* factors listed above and including Parkinson's diagnosis affecting mobility and activity tolerance  PT will continue to see pt during current hospitalization in order to address the deficits listed above and provide interventions consistent w/ POC in effort to achieve STGs  Barriers to Discharge Inaccessible home environment;Decreased caregiver support   Barriers to Discharge Comments pt has 18 steps to enter apartment   Goals   Patient Goals to be able to walk   STG Expiration Date 11/15/22   Plan   Treatment/Interventions Functional transfer training;LE strengthening/ROM; Therapeutic exercise; Endurance training;Bed mobility;Gait training;Spoke to nursing   Progress Slow progress, medical status limitations  (progress hindered by diagnosis of Parkinson's diagnosis)   PT Frequency 3-5x/wk   Recommendation   PT Discharge Recommendation Post acute rehabilitation services   AM-PAC Basic Mobility Inpatient   Turning in Bed Without Bedrails 3   Lying on Back to Sitting on Edge of Flat Bed 3   Moving Bed to Chair 3   Standing Up From Chair 3   Walk in Room 1   Climb 3-5 Stairs 1   Basic Mobility Inpatient Raw Score 14   Basic Mobility Standardized Score 35 55   Highest Level Of Mobility   JH-HLM Goal 4: Move to chair/commode   JH-HLM Achieved 3: Sit at edge of bed   Education   Education Provided Mobility training;Assistive device;Home exercise program   Patient Demonstrates acceptance/verbal understanding   End of Consult   Patient Position at End of Consult Supine;Bed/Chair alarm activated; All needs within reach       The patient's AM-PAC Basic Mobility Inpatient Short Form Raw Score is 14  A Raw score of less than or equal to 16 suggests the patient may benefit from discharge to post-acute rehabilitation services  Please also refer to the recommendation of the Physical Therapist for safe discharge planning        Jaquan Mojica

## 2022-11-02 NOTE — PROGRESS NOTES
Progress Note - Orthopedics   Tracey Correa 70 y o  female MRN: [id number]  Unit/Bed#: -01      Subjective:    70 y  o female POD2 s/p left ankle ORIF  No acute events, no new complaints  Patient doing well  Pain well controlled  She does complain of a burning pain along the top of the foot  Denies fevers, chills, CP, SOB, N/V, numbness or tingling  She has been up and working with PT on gait training, noted to be doing overall well with non-weight bearing restrictions, though per PT/OT teams does continue to require increased assistance      Labs:  0   Lab Value Date/Time    HCT 32 1 (L) 11/02/2022 0446    HCT 36 0 11/01/2022 0515    HCT 39 5 10/30/2022 1829    HGB 10 1 (L) 11/02/2022 0446    HGB 11 4 (L) 11/01/2022 0515    HGB 12 1 10/30/2022 1829    WBC 9 74 11/02/2022 0446    WBC 10 85 (H) 11/01/2022 0515    WBC 12 98 (H) 10/30/2022 1829       Meds:    Current Facility-Administered Medications:   •  acetaminophen (TYLENOL) tablet 975 mg, 975 mg, Oral, Q8H [facility], Lashonda Mckeon PA-C, 975 mg at 11/02/22 0540  •  aspirin chewable tablet 81 mg, 81 mg, Oral, Daily, Lashonda Mckeon PA-C, 81 mg at 11/02/22 9299  •  carbidopa-levodopa (SINEMET)  mg per tablet 1 tablet, 1 tablet, Oral, TID, Lashonda Mckeon PA-C, 1 tablet at 11/02/22 9143  •  cholecalciferol (VITAMIN D3) tablet 1,000 Units, 1,000 Units, Oral, Daily, Lashonda Mckeon PA-C, 1,000 Units at 11/02/22 0255  •  enoxaparin (LOVENOX) subcutaneous injection 40 mg, 40 mg, Subcutaneous, Daily, Lashonda Mckeon PA-C, 40 mg at 11/02/22 9100  •  ezetimibe (ZETIA) tablet 10 mg, 10 mg, Oral, HS, Lashonda Mckeon PA-C, 10 mg at 11/01/22 2106  •  glycerin-hypromellose- (ARTIFICIAL TEARS) ophthalmic solution 1 drop, 1 drop, Both Eyes, Q4H PRN, Omar Albert PA-C, 1 drop at 11/02/22 9434  •  lactated ringers infusion, 125 mL/hr, Intravenous, Continuous, Lashonda Mckeon PA-C, Stopped at 11/01/22 2112  •  levothyroxine tablet 50 mcg, 50 mcg, Oral, Early Morning, JULES Zaragoza-C, 50 mcg at 11/02/22 0540  •  metoprolol (LOPRESSOR) injection 5 mg, 5 mg, Intravenous, Q8H PRN, Amanda Zhang PA-C  •  metoprolol tartrate (LOPRESSOR) tablet 25 mg, 25 mg, Oral, Daily, JULES Zaragoza-C, 25 mg at 11/02/22 6316  •  ondansetron [facility]) injection 4 mg, 4 mg, Intravenous, Q6H PRN, Amanda Zhang PA-C  •  oxybutynin [facility]) tablet 5 mg, 5 mg, Oral, BID, JULES Beckford-C, 5 mg at 11/02/22 1311  •  oxyCODONE (ROXICODONE) immediate release tablet 10 mg, 10 mg, Oral, Q4H PRN, JULES Zaragoza-C, 10 mg at 11/01/22 2106  •  oxyCODONE (ROXICODONE) IR tablet 5 mg, 5 mg, Oral, Q4H PRN, JULES Zaragoza-C, 5 mg at 11/01/22 7271  •  pantoprazole (PROTONIX) EC tablet 20 mg, 20 mg, Oral, Early Morning, SHARLENE ZaragozaC, 20 mg at 11/02/22 0540  •  Pimavanserin Tartrate CAPS 34 mg, 34 mg, Oral, HS, JULES Zaragoza-C, 34 mg at 11/01/22 2107  •  polyethylene glycol (MIRALAX) packet 17 g, 17 g, Oral, Daily PRN, Amanda Zhang PA-C  •  pravastatin (PRAVACHOL) tablet 20 mg, 20 mg, Oral, Daily With Brenda Jewell PA-C, 20 mg at 11/01/22 1549  •  senna-docusate sodium (SENOKOT S) 8 6-50 mg per tablet 1 tablet, 1 tablet, Oral, BID, Amanda Zhang PA-C, 1 tablet at 11/02/22 1309    Blood Culture:   Lab Results   Component Value Date    BLOODCX Streptococcus mitis oralis group (A) 06/02/2022    BLOODCX Staphylococcus coagulase negative (A) 06/02/2022       Wound Culture:   No results found for: WOUNDCULT    Ins and Outs:  I/O last 24 hours: In: 1360 [P O :360; I V :1000]  Out: 350 [Urine:350]      Physical:  Vitals:    11/02/22 0755   BP: 144/93   Pulse: 64   Resp: 18   Temp: 97 5 °F (36 4 °C)   SpO2: 98%     Musculoskeletal: left Lower Extremity  · Patient lying in bed, in no acute distress  · The splint is clean, dry, and intact without serosanguinous strikethrough  There is swelling noted along the dorsum of the foot and toes, consistent with surgical course   No lesions, ecchymosis, erythema, warmth, or other signs of infection or irritation  · Sensation intact to saphenous, sural, tibial, superficial peroneal nerve, and deep peroneal  · Patient is able to wiggle all toes  · 2+ DP pulse, symmetric bilaterally  · Digits warm and well perfused  · Soft lower extremity compartments, negative Анна's sign  · Capillary refill < 2 seconds, lower extremity is well perfused    Assessment:    70 y  o female POD2 s/p left ankle ORIF  Patient doing well  Ambulatory dysfunction; Parkinson's diease; hypertensive urgency; GERD; hypothyroidism    Plan:  · NWB LLE  · Keep splint clean, dry, and intact  May reinforce splint with abds/ace wrap as needed  · HGB 10 1 this AM  Will continue to monitor for signs and symptoms of ABLA, and administer IVF/prbc as indicated   · PT/OT gait training with NWB restrictions  · Pain control per primary team  · DVT ppx with Lovenox in hospital  May transition to ASA 325mg BID if discharged home  DVT prophylaxis required for a total of 21 days post-operatively  · Dispo: Ortho will follow   ? Discharge to [facility] once medically cleared  CM on board     ? Once discharged, the patient will follow up with Dr Arline Rodriguez PA-C

## 2022-11-02 NOTE — PLAN OF CARE
Problem: Potential for Falls  Goal: Patient will remain free of falls  Description: INTERVENTIONS:  - Educate patient/family on patient safety including physical limitations  - Instruct patient to call for assistance with activity   - Consult OT/PT to assist with strengthening/mobility   - Keep Call bell within reach  - Keep bed low and locked with side rails adjusted as appropriate  - Keep care items and personal belongings within reach  - Initiate and maintain comfort rounds  - Make Fall Risk Sign visible to staff  - Offer Toileting every   Hours, in advance of need  - Initiate/Maintain   alarm  - Obtain necessary fall risk management equipment:      - Apply yellow socks and bracelet for high fall risk patients  - Consider moving patient to room near nurses station  Outcome: Progressing     Problem: MOBILITY - ADULT  Goal: Maintain or return to baseline ADL function  Description: INTERVENTIONS:  - Educate patient/family on patient safety including physical limitations  - Instruct patient to call for assistance with activity   - Consult OT/PT to assist with strengthening/mobility   - Keep Call bell within reach  - Keep bed low and locked with side rails adjusted as appropriate  - Keep care items and personal belongings within reach  - Initiate and maintain comfort rounds  - Make Fall Risk Sign visible to staff  - Offer Toileting every   Hours, in advance of need  - Initiate/Maintain   alarm  - Obtain necessary fall risk management equipment:      - Apply yellow socks and bracelet for high fall risk patients  - Consider moving patient to room near nurses station  Outcome: Progressing  Goal: Maintains/Returns to pre admission functional level  Description: INTERVENTIONS:  - Perform BMAT or MOVE assessment daily    - Set and communicate daily mobility goal to care team and patient/family/caregiver  - Collaborate with rehabilitation services on mobility goals if consulted  - Perform Range of Motion   times a day    - Reposition patient every   hours  - Dangle patient   times a day  - Stand patient   times a day  - Ambulate patient   times a day  - Out of bed to chair   times a day   - Out of bed for meals   times a day  - Out of bed for toileting  - Record patient progress and toleration of activity level   Outcome: Progressing     Problem: PAIN - ADULT  Goal: Verbalizes/displays adequate comfort level or baseline comfort level  Description: Interventions:  - Encourage patient to monitor pain and request assistance  - Assess pain using appropriate pain scale  - Administer analgesics based on type and severity of pain and evaluate response  - Implement non-pharmacological measures as appropriate and evaluate response  - Consider cultural and social influences on pain and pain management  - Notify physician/advanced practitioner if interventions unsuccessful or patient reports new pain  Outcome: Progressing     Problem: SAFETY ADULT  Goal: Patient will remain free of falls  Description: INTERVENTIONS:  - Educate patient/family on patient safety including physical limitations  - Instruct patient to call for assistance with activity   - Consult OT/PT to assist with strengthening/mobility   - Keep Call bell within reach  - Keep bed low and locked with side rails adjusted as appropriate  - Keep care items and personal belongings within reach  - Initiate and maintain comfort rounds  - Make Fall Risk Sign visible to staff  - Offer Toileting every   Hours, in advance of need  - Initiate/Maintain   alarm  - Obtain necessary fall risk management equipment:      - Apply yellow socks and bracelet for high fall risk patients  - Consider moving patient to room near nurses station  Outcome: Progressing  Goal: Maintain or return to baseline ADL function  Description: INTERVENTIONS:  - Educate patient/family on patient safety including physical limitations  - Instruct patient to call for assistance with activity   - Consult OT/PT to assist with strengthening/mobility   - Keep Call bell within reach  - Keep bed low and locked with side rails adjusted as appropriate  - Keep care items and personal belongings within reach  - Initiate and maintain comfort rounds  - Make Fall Risk Sign visible to staff  - Offer Toileting every   Hours, in advance of need  - Initiate/Maintain   alarm  - Obtain necessary fall risk management equipment:      - Apply yellow socks and bracelet for high fall risk patients  - Consider moving patient to room near nurses station  Outcome: Progressing  Goal: Maintains/Returns to pre admission functional level  Description: INTERVENTIONS:  - Perform BMAT or MOVE assessment daily    - Set and communicate daily mobility goal to care team and patient/family/caregiver  - Collaborate with rehabilitation services on mobility goals if consulted  - Perform Range of Motion   times a day  - Reposition patient every   hours  - Dangle patient   times a day  - Stand patient   times a day  - Ambulate patient   times a day  - Out of bed to chair   times a day   - Out of bed for meals     times a day  - Out of bed for toileting  - Record patient progress and toleration of activity level   Outcome: Progressing     Problem: DISCHARGE PLANNING  Goal: Discharge to home or other facility with appropriate resources  Description: INTERVENTIONS:  - Identify barriers to discharge w/patient and caregiver  - Arrange for needed discharge resources and transportation as appropriate  - Identify discharge learning needs (meds, wound care, etc )  - Arrange for interpretive services to assist at discharge as needed  - Refer to Case Management Department for coordinating discharge planning if the patient needs post-hospital services based on physician/advanced practitioner order or complex needs related to functional status, cognitive ability, or social support system  Outcome: Progressing     Problem: Knowledge Deficit  Goal: Patient/family/caregiver demonstrates understanding of disease process, treatment plan, medications, and discharge instructions  Description: Complete learning assessment and assess knowledge base  Interventions:  - Provide teaching at level of understanding  - Provide teaching via preferred learning methods  Outcome: Progressing     Problem: Prexisting or High Potential for Compromised Skin Integrity  Goal: Skin integrity is maintained or improved  Description: INTERVENTIONS:  - Identify patients at risk for skin breakdown  - Assess and monitor skin integrity  - Assess and monitor nutrition and hydration status  - Monitor labs   - Assess for incontinence   - Turn and reposition patient  - Assist with mobility/ambulation  - Relieve pressure over bony prominences  - Avoid friction and shearing  - Provide appropriate hygiene as needed including keeping skin clean and dry  - Evaluate need for skin moisturizer/barrier cream  - Collaborate with interdisciplinary team   - Patient/family teaching  - Consider wound care consult   Outcome: Progressing

## 2022-11-02 NOTE — PLAN OF CARE
Problem: Potential for Falls  Goal: Patient will remain free of falls  Description: INTERVENTIONS:  - Educate patient/family on patient safety including physical limitations  - Instruct patient to call for assistance with activity   - Consult OT/PT to assist with strengthening/mobility   - Keep Call bell within reach  - Keep bed low and locked with side rails adjusted as appropriate  - Keep care items and personal belongings within reach  - Initiate and maintain comfort rounds  - Make Fall Risk Sign visible to staff    - Apply yellow socks and bracelet for high fall risk patients  - Consider moving patient to room near nurses station  Outcome: Progressing     Problem: MOBILITY - ADULT  Goal: Maintain or return to baseline ADL function  Description: INTERVENTIONS:  -  Assess patient's ability to carry out ADLs; assess patient's baseline for ADL function and identify physical deficits which impact ability to perform ADLs (bathing, care of mouth/teeth, toileting, grooming, dressing, etc )  - Assess/evaluate cause of self-care deficits   - Assess range of motion  - Assess patient's mobility; develop plan if impaired  - Assess patient's need for assistive devices and provide as appropriate  - Encourage maximum independence but intervene and supervise when necessary  - Involve family in performance of ADLs  - Assess for home care needs following discharge   - Consider OT consult to assist with ADL evaluation and planning for discharge  - Provide patient education as appropriate  Outcome: Progressing  Goal: Maintains/Returns to pre admission functional level  Description: INTERVENTIONS:  - Perform BMAT or MOVE assessment daily    - Set and communicate daily mobility goal to care team and patient/family/caregiver     - Collaborate with rehabilitation services on mobility goals if consulted    - Out of bed for toileting  - Record patient progress and toleration of activity level   Outcome: Progressing     Problem: PAIN - ADULT  Goal: Verbalizes/displays adequate comfort level or baseline comfort level  Description: Interventions:  - Encourage patient to monitor pain and request assistance  - Assess pain using appropriate pain scale  - Administer analgesics based on type and severity of pain and evaluate response  - Implement non-pharmacological measures as appropriate and evaluate response  - Consider cultural and social influences on pain and pain management  - Notify physician/advanced practitioner if interventions unsuccessful or patient reports new pain  Outcome: Progressing     Problem: SAFETY ADULT  Goal: Patient will remain free of falls  Description: INTERVENTIONS:  - Educate patient/family on patient safety including physical limitations  - Instruct patient to call for assistance with activity   - Consult OT/PT to assist with strengthening/mobility   - Keep Call bell within reach  - Keep bed low and locked with side rails adjusted as appropriate  - Keep care items and personal belongings within reach  - Initiate and maintain comfort rounds  - Make Fall Risk Sign visible to staff    - Apply yellow socks and bracelet for high fall risk patients  - Consider moving patient to room near nurses station  Outcome: Progressing  Goal: Maintain or return to baseline ADL function  Description: INTERVENTIONS:  -  Assess patient's ability to carry out ADLs; assess patient's baseline for ADL function and identify physical deficits which impact ability to perform ADLs (bathing, care of mouth/teeth, toileting, grooming, dressing, etc )  - Assess/evaluate cause of self-care deficits   - Assess range of motion  - Assess patient's mobility; develop plan if impaired  - Assess patient's need for assistive devices and provide as appropriate  - Encourage maximum independence but intervene and supervise when necessary  - Involve family in performance of ADLs  - Assess for home care needs following discharge   - Consider OT consult to assist with ADL evaluation and planning for discharge  - Provide patient education as appropriate  Outcome: Progressing  Goal: Maintains/Returns to pre admission functional level  Description: INTERVENTIONS:  - Perform BMAT or MOVE assessment daily    - Set and communicate daily mobility goal to care team and patient/family/caregiver  - Collaborate with rehabilitation services on mobility goals if consulted    - Out of bed for toileting  - Record patient progress and toleration of activity level   Outcome: Progressing     Problem: DISCHARGE PLANNING  Goal: Discharge to home or other facility with appropriate resources  Description: INTERVENTIONS:  - Identify barriers to discharge w/patient and caregiver  - Arrange for needed discharge resources and transportation as appropriate  - Identify discharge learning needs (meds, wound care, etc )  - Arrange for interpretive services to assist at discharge as needed  - Refer to Case Management Department for coordinating discharge planning if the patient needs post-hospital services based on physician/advanced practitioner order or complex needs related to functional status, cognitive ability, or social support system  Outcome: Progressing     Problem: Knowledge Deficit  Goal: Patient/family/caregiver demonstrates understanding of disease process, treatment plan, medications, and discharge instructions  Description: Complete learning assessment and assess knowledge base    Interventions:  - Provide teaching at level of understanding  - Provide teaching via preferred learning methods  Outcome: Progressing     Problem: Prexisting or High Potential for Compromised Skin Integrity  Goal: Skin integrity is maintained or improved  Description: INTERVENTIONS:  - Identify patients at risk for skin breakdown  - Assess and monitor skin integrity  - Assess and monitor nutrition and hydration status  - Monitor labs   - Assess for incontinence   - Turn and reposition patient  - Assist with mobility/ambulation  - Relieve pressure over bony prominences  - Avoid friction and shearing  - Provide appropriate hygiene as needed including keeping skin clean and dry  - Evaluate need for skin moisturizer/barrier cream  - Collaborate with interdisciplinary team   - Patient/family teaching  - Consider wound care consult   Outcome: Progressing

## 2022-11-02 NOTE — CASE MANAGEMENT
Case Management Discharge Planning Note    Patient name Holly Barraza  Location /-19 MRN [id number]  : 1950 Date 2022       Current Admission Date: 10/30/2022  Current Admission Diagnosis:Closed fracture dislocation of left ankle   Patient Active Problem List    Diagnosis Date Noted   • Frequent urination 2022   • Fall down steps 10/30/2022   • Closed fracture dislocation of left ankle 10/30/2022   • Parkinson's disease ([facility] ) 10/30/2022   • Hypertensive urgency 10/30/2022   • Gastro-esophageal reflux disease without esophagitis 01/10/2022   • Chronic obstructive pulmonary disease ([facility] ) 2021   • Acquired hypothyroidism 2015      LOS (days): 2  Geometric Mean LOS (GMLOS) (days): 2 70  Days to GMLOS:0 6     OBJECTIVE:  Risk of Unplanned Readmission Score: 12 31         Current admission status: Inpatient   Preferred Pharmacy:   [facility] [address] [facility]UN[facility] [address]  Phone: [phone] Fax: [phone]    Primary Care Provider: Tatyana Wilkerson DO    Primary Insurance: Jacqueline Davison [facility]  Secondary Insurance:     DISCHARGE DETAILS:    Discharge planning discussed with[de-identified] patient and spouse  Freedom of Choice: Yes  Comments - Freedom of Choice: [facility]  CM contacted family/caregiver?: Yes             Contacts  Patient Contacts: Perri Crespo, spouse  Relationship to Patient[de-identified] Family  Contact Method:  In Person  Reason/Outcome: Continuity of Care, Discharge [address]         Is the patient interested in [facility] at discharge?: No    DME Referral Provided  Referral made for DME?: No    Other Referral/Resources/Interventions Provided:  Interventions: Acute Rehab  Referral Comments: [facility]         Treatment Team Recommendation: Acute Rehab  Discharge Destination Plan[de-identified] Acute Rehab            CM met with patient and spouse to review [facility] Provider Choice List, availability at [facility], [facility] and [facility]  Family chose [facility]  CM made [facility] providers aware of patients choice and CM submitted auth for [facility]

## 2022-11-03 LAB
ANION GAP SERPL CALCULATED.3IONS-SCNC: 6 MMOL/L (ref 4–13)
BUN SERPL-MCNC: 24 MG/DL (ref 5–25)
CALCIUM SERPL-MCNC: 8.1 MG/DL (ref 8.3–10.1)
CHLORIDE SERPL-SCNC: 104 MMOL/L (ref 96–108)
CO2 SERPL-SCNC: 29 MMOL/L (ref 21–32)
CREAT SERPL-MCNC: 0.85 MG/DL (ref 0.6–1.3)
ERYTHROCYTE [DISTWIDTH] IN BLOOD BY AUTOMATED COUNT: 12.6 % (ref 11.6–15.1)
GFR SERPL CREATININE-BSD FRML MDRD: 69 ML/MIN/1.73SQ M
GLUCOSE SERPL-MCNC: 102 MG/DL (ref 65–140)
HCT VFR BLD AUTO: 31.4 % (ref 34.8–46.1)
HGB BLD-MCNC: 9.9 G/DL (ref 11.5–15.4)
MCH RBC QN AUTO: 29.7 PG (ref 26.8–34.3)
MCHC RBC AUTO-ENTMCNC: 31.5 G/DL (ref 31.4–37.4)
MCV RBC AUTO: 94 FL (ref 82–98)
PLATELET # BLD AUTO: 221 THOUSANDS/UL (ref 149–390)
PMV BLD AUTO: 10.5 FL (ref 8.9–12.7)
POTASSIUM SERPL-SCNC: 4.2 MMOL/L (ref 3.5–5.3)
RBC # BLD AUTO: 3.33 MILLION/UL (ref 3.81–5.12)
SODIUM SERPL-SCNC: 139 MMOL/L (ref 135–147)
WBC # BLD AUTO: 8.82 THOUSAND/UL (ref 4.31–10.16)

## 2022-11-03 RX ORDER — AMOXICILLIN 250 MG
2 CAPSULE ORAL 2 TIMES DAILY
Status: DISCONTINUED | OUTPATIENT
Start: 2022-11-03 | End: 2022-11-04 | Stop reason: HOSPADM

## 2022-11-03 RX ORDER — BISACODYL 10 MG
10 SUPPOSITORY, RECTAL RECTAL ONCE
Status: COMPLETED | OUTPATIENT
Start: 2022-11-03 | End: 2022-11-03

## 2022-11-03 RX ADMIN — PANTOPRAZOLE SODIUM 20 MG: 20 TABLET, DELAYED RELEASE ORAL at 06:19

## 2022-11-03 RX ADMIN — SENNOSIDES, DOCUSATE SODIUM 2 TABLET: 8.6; 5 TABLET ORAL at 17:23

## 2022-11-03 RX ADMIN — ACETAMINOPHEN 975 MG: 325 TABLET ORAL at 21:32

## 2022-11-03 RX ADMIN — ASPIRIN 81 MG CHEWABLE TABLET 81 MG: 81 TABLET CHEWABLE at 09:25

## 2022-11-03 RX ADMIN — BISACODYL 10 MG: 10 SUPPOSITORY RECTAL at 09:25

## 2022-11-03 RX ADMIN — Medication 1000 UNITS: at 09:26

## 2022-11-03 RX ADMIN — SENNOSIDES, DOCUSATE SODIUM 2 TABLET: 8.6; 5 TABLET ORAL at 09:25

## 2022-11-03 RX ADMIN — CARBIDOPA AND LEVODOPA 1 TABLET: 25; 100 TABLET ORAL at 21:33

## 2022-11-03 RX ADMIN — METOPROLOL TARTRATE 25 MG: 25 TABLET, FILM COATED ORAL at 09:26

## 2022-11-03 RX ADMIN — CARBIDOPA AND LEVODOPA 1 TABLET: 25; 100 TABLET ORAL at 09:26

## 2022-11-03 RX ADMIN — EZETIMIBE 10 MG: 10 TABLET ORAL at 21:32

## 2022-11-03 RX ADMIN — CARBIDOPA AND LEVODOPA 1 TABLET: 25; 100 TABLET ORAL at 15:58

## 2022-11-03 RX ADMIN — OXYBUTYNIN CHLORIDE 5 MG: 5 TABLET ORAL at 17:24

## 2022-11-03 RX ADMIN — OXYBUTYNIN CHLORIDE 5 MG: 5 TABLET ORAL at 09:25

## 2022-11-03 RX ADMIN — PRAVASTATIN SODIUM 20 MG: 20 TABLET ORAL at 15:58

## 2022-11-03 RX ADMIN — ACETAMINOPHEN 975 MG: 325 TABLET ORAL at 13:14

## 2022-11-03 RX ADMIN — ENOXAPARIN SODIUM 40 MG: 40 INJECTION SUBCUTANEOUS at 09:25

## 2022-11-03 RX ADMIN — ACETAMINOPHEN 975 MG: 325 TABLET ORAL at 06:19

## 2022-11-03 RX ADMIN — PIMAVANSERIN TARTRATE 34 MG: 34 CAPSULE ORAL at 21:34

## 2022-11-03 RX ADMIN — GLYCERIN, HYPROMELLOSE, POLYETHYLENE GLYCOL 1 DROP: .2; .2; 1 LIQUID OPHTHALMIC at 09:26

## 2022-11-03 RX ADMIN — LEVOTHYROXINE SODIUM 50 MCG: 50 TABLET ORAL at 06:19

## 2022-11-03 NOTE — PROGRESS NOTES
Progress Note - Orthopedics   Fred Mendoza 70 y o  female MRN: [id number]  Unit/Bed#: -01      Subjective:    70 y  o female POD3 s/p left ankle ORIF  No acute events, no new complaints  Patient doing well  Pain well controlled  The patient notes ongoing burning sensation along the top of the foot if she lets the extremity hang down like when she is sitting over the edge of the bed or in the chair  Denies fevers, chills, CP, SOB, N/V, numbness or tingling  The patient notes that she has had her first bowel movement since her surgery today       Labs:  0   Lab Value Date/Time    HCT 31 4 (L) 11/03/2022 0531    HCT 32 1 (L) 11/02/2022 0446    HCT 36 0 11/01/2022 0515    HGB 9 9 (L) 11/03/2022 0531    HGB 10 1 (L) 11/02/2022 0446    HGB 11 4 (L) 11/01/2022 0515    WBC 8 82 11/03/2022 0531    WBC 9 74 11/02/2022 0446    WBC 10 85 (H) 11/01/2022 0515       Meds:    Current Facility-Administered Medications:   •  acetaminophen (TYLENOL) tablet 975 mg, 975 mg, Oral, Q8H [facility], Fannie Lobato PA-C, 975 mg at 11/03/22 1314  •  aspirin chewable tablet 81 mg, 81 mg, Oral, Daily, Fannie Lobato PA-C, 81 mg at 11/03/22 7010  •  carbidopa-levodopa (SINEMET)  mg per tablet 1 tablet, 1 tablet, Oral, TID, Fannie Lobato PA-C, 1 tablet at 11/03/22 1558  •  cholecalciferol (VITAMIN D3) tablet 1,000 Units, 1,000 Units, Oral, Daily, Fannie Lobato PA-C, 1,000 Units at 11/03/22 2246  •  enoxaparin (LOVENOX) subcutaneous injection 40 mg, 40 mg, Subcutaneous, Daily, Fannie Lobato PA-C, 40 mg at 11/03/22 6775  •  ezetimibe (ZETIA) tablet 10 mg, 10 mg, Oral, HS, Fannie Lobato PA-C, 10 mg at 11/02/22 2141  •  glycerin-hypromellose- (ARTIFICIAL TEARS) ophthalmic solution 1 drop, 1 drop, Both Eyes, Q4H PRN, Julius Becerra PA-C, 1 drop at 11/03/22 9726  •  lactated ringers infusion, 125 mL/hr, Intravenous, Continuous, Fannie Lobato PA-C, Stopped at 11/01/22 2112  •  levothyroxine tablet 50 mcg, 50 mcg, Oral, Early Morning, Cecilia Ocasio PA-C, 50 mcg at 11/03/22 6554  •  metoprolol tartrate (LOPRESSOR) tablet 25 mg, 25 mg, Oral, Daily, Cecilia Ocasio PA-C, 25 mg at 11/03/22 1222  •  ondansetron [facility]) injection 4 mg, 4 mg, Intravenous, Q6H PRN, Cecilia Ocasio PA-C  •  oxybutynin [facility]) tablet 5 mg, 5 mg, Oral, BID, Bert Nelson PA-C, 5 mg at 11/03/22 9156  •  oxyCODONE (ROXICODONE) immediate release tablet 10 mg, 10 mg, Oral, Q4H PRN, Cecilia Ocasio PA-C, 10 mg at 11/01/22 2106  •  oxyCODONE (ROXICODONE) IR tablet 5 mg, 5 mg, Oral, Q4H PRN, Cecilia Ocasio PA-C, 5 mg at 11/01/22 0175  •  pantoprazole (PROTONIX) EC tablet 20 mg, 20 mg, Oral, Early Morning, Cecilia Ocasio PA-C, 20 mg at 11/03/22 0744  •  Pimavanserin Tartrate CAPS 34 mg, 34 mg, Oral, HS, JULES Dodson-C, 34 mg at 11/02/22 2142  •  polyethylene glycol (MIRALAX) packet 17 g, 17 g, Oral, Daily PRN, Cecilia Ocasio PA-C  •  pravastatin (PRAVACHOL) tablet 20 mg, 20 mg, Oral, Daily With Fernando Hughes PA-C, 20 mg at 11/03/22 1558  •  senna-docusate sodium (SENOKOT S) 8 6-50 mg per tablet 2 tablet, 2 tablet, Oral, BID, BOLIVAR Ngo, 2 tablet at 11/03/22 0723    Blood Culture:   Lab Results   Component Value Date    BLOODCX Streptococcus mitis oralis group (A) 06/02/2022    BLOODCX Staphylococcus coagulase negative (A) 06/02/2022       Wound Culture:   No results found for: WOUNDCULT    Ins and Outs:  I/O last 24 hours: In: 960 [P O :960]  Out: 400 [Urine:400]      Physical:  Vitals:    11/03/22 1356   BP: 156/79   Pulse:    Resp: 18   Temp: 98 1 °F (36 7 °C)   SpO2:      Musculoskeletal: left Lower Extremity  · Patient lying in bed, in no acute distress  · The splint is clean, dry, and intact  There is mild serosanguinous strikethrough present along the posterior aspect  There is swelling noted along the dorsum of the foot and toes, consistent with surgical course   No lesions, ecchymosis, erythema, warmth, or other signs of infection or irritation  · Sensation intact to saphenous, sural, tibial, superficial peroneal nerve, and deep peroneal  · Patient is able to wiggle all toes  · Intact active ROM of the knee and hip  Patient is able to perform a straight leg raise  · 2+ DP pulse, symmetric bilaterally  · Digits warm and well perfused  · Soft lower extremity compartments, negative Анна's sign  · Capillary refill < 2 seconds, lower extremity is well perfused    Assessment:    70 y  o female POD3 s/p left ankle ORIF  Patient doing well  Ambulatory dysfunction; Parkinson's diease; hypertensive urgency; GERD; hypothyroidism    Plan:  · NWB LLE  · Keep splint clean, dry, and intact  May reinforce splint with abds/ace wrap as needed  · HGB 9 9 this AM  Will continue to monitor for signs and symptoms of ABLA, and administer IVF/prbc as indicated   · PT/OT gait training with NWB restrictions  · Pain control per primary team  · DVT ppx with Lovenox in hospital  May transition to ASA 325mg BID if discharged home  DVT prophylaxis required for a total of 21 days post-operatively  · Dispo: Ortho will follow   ? Discharge to [facility], pending placement  CM on board     ? Once discharged, the patient will follow up with Dr Aurelio Saha PA-C

## 2022-11-03 NOTE — CASE MANAGEMENT
Support Center has received denial   Denial received for: Acute Rehab  Facility: [place] [address] Manager notified: Torin Goodwin  Denial #: [id number]  Peer to Peer phone#:  [phone]     Deadline: 11/4 @4pm    Bashir Sánchez has approved an authorization for admission to a SNF

## 2022-11-03 NOTE — CASE MANAGEMENT
Support Center received request for authorization from Care Manager    Authorization request for: SNF  Facility Name: [facility]  NPI: [id number]  Facility MD:   Dr Cindy Eason:  [id number]  Authorization initiated by contacting: [facility] Via: Fax  Clinicals submitted via: [phone]

## 2022-11-03 NOTE — ASSESSMENT & PLAN NOTE
· Presents with left ankle pain and swelling after fall down 2 cement steps twisting left ankle, unable to walk after    Not on anticoagulation  · X-ray with closed fracture and dislocation of left ankle  · Ortho consult  · PT/OT consult :  Recommending rehab> acute rehab peer to peer need

## 2022-11-03 NOTE — PROGRESS NOTES
[address]  Progress Note Mike Petersen 1950, 70 y o  female MRN: [id number]  Unit/Bed#: -01 Encounter: [id number]  Primary Care Provider: Gwen Westfall DO   Date and time admitted to hospital: 10/30/2022  1:29 PM    * Closed fracture dislocation of left ankle  Assessment & Plan  · Pain, swelling inability to walk follow-up fall  · X-ray with dislocation and closed fracture left ankle (official read not available)  · ED left ankle reduced and splinted  · Pain control  · Orthopedic consult  · Bimalleolar fracture subluxation left ankle   · POD 2 - ORIF left ankle  · NWB LLE   · Neurovascular checks  · PT/OT consult -acute rehab, pending   · DVT prophylaxis Lovenox while inpatient, transition to  b i d  If discharged home total 21 days postop    Fall down steps  Assessment & Plan  · Presents with left ankle pain and swelling after fall down 2 cement steps twisting left ankle, unable to walk after    Not on anticoagulation  · X-ray with closed fracture and dislocation of left ankle  · Ortho consult  · PT/OT consult :  Recommending rehab> acute rehab peer to peer need       Frequent urination  Assessment & Plan  · Patient has had frequent urination for "quite some time"   · Was recently put on oxybutynin - will now continue as inpatient (was not listed on med rec)  · Was to have appointment with urogynegology however was admitted to hospital   UA negative for UTI   · Encourage follow up with urogynecology as outpatient     Hypertensive urgency  Assessment & Plan  · Noted intermittent 180s-190s in ED and on arrival to unit  · PTA Metoprolol tartrate 25 mg daily  · PRN pain control  · Prn lopressor 5mg    Resolved - BP controlled with home medications      Parkinson's disease ([facility] [address] )  Assessment & Plan  · Continue PTA Sinemet    Gastro-esophageal reflux disease without esophagitis  Assessment & Plan  · PTA omeprazole    Acquired hypothyroidism  Assessment & Plan  · Continue PTA levothyroxine  · reports frequent dry mouth and voiding  · TSH wnl   · HgbA1C - prediabetic range - counseled         VTE Pharmacologic Prophylaxis: VTE Score: 9 High Risk (Score >/= 5) - Pharmacological DVT Prophylaxis Ordered: enoxaparin (Lovenox)  Sequential Compression Devices Ordered  Patient Centered Rounds: I performed bedside rounds with nursing staff today  Discussions with Specialists or Other Care Team Provider: CM    Education and Discussions with Family / Patient: Updated  () at bedside  Time Spent for Care: 45 minutes  More than 50% of total time spent on counseling and coordination of care as described above  Current Length of Stay: 3 day(s)  Current Patient Status: Inpatient   Certification Statement: The patient will continue to require additional inpatient hospital stay due to Waiting authorization and placement for rehab  Discharge Plan: Anticipate discharge tomorrow to rehab facility  Code Status: Level 3 - DNAR and DNI    Subjective:   Having difficulty having bowel movements increased bowel regimen this morning  Also notes she is having frequent urination again last if her oxybutynin had been resumed discussed above started two days ago continue to monitor recommended rescheduling urogynecology appointment that she missed due to hospitalization for continued outpatient management    Objective:     Vitals:   Temp (24hrs), Av 3 °F (36 8 °C), Min:97 7 °F (36 5 °C), Max:99 °F (37 2 °C)    Temp:  [97 7 °F (36 5 °C)-99 °F (37 2 °C)] 98 1 °F (36 7 °C)  HR:  [75-81] 80  Resp:  [18] 18  BP: (119-156)/(64-89) 156/79  SpO2:  [94 %-97 %] 97 %  Body mass index is 38 26 kg/m²  Input and Output Summary (last 24 hours): Intake/Output Summary (Last 24 hours) at 11/3/2022 1522  Last data filed at 11/3/2022 0934  Gross per 24 hour   Intake 540 ml   Output 400 ml   Net 140 ml       Physical Exam:   Physical Exam  Vitals and nursing note reviewed  Constitutional:       General: She is not in acute distress  Appearance: She is well-developed  She is obese  HENT:      Head: Normocephalic and atraumatic  Mouth/Throat:      Mouth: Mucous membranes are moist    Eyes:      Conjunctiva/sclera: Conjunctivae normal       Pupils: Pupils are equal, round, and reactive to light  Cardiovascular:      Rate and Rhythm: Normal rate and regular rhythm  Heart sounds: Normal heart sounds  No murmur heard  Pulmonary:      Effort: Pulmonary effort is normal  No respiratory distress  Breath sounds: Normal breath sounds  Abdominal:      General: Bowel sounds are normal  There is no distension  Palpations: Abdomen is soft  Tenderness: There is no abdominal tenderness  Musculoskeletal:         General: Swelling (left foot, sensation intact, +2 pulse, <3 cap refill) present  Cervical back: Neck supple  Skin:     General: Skin is warm and dry  Neurological:      General: No focal deficit present  Mental Status: She is alert  Psychiatric:         Mood and Affect: Mood normal          Thought Content: Thought content normal           Additional Data:     Labs:  Results from last 7 days   Lab Units 11/03/22  0531   WBC Thousand/uL 8 82   HEMOGLOBIN g/dL 9 9*   HEMATOCRIT % 31 4*   PLATELETS Thousands/uL 221     Results from last 7 days   Lab Units 11/03/22  0531   SODIUM mmol/L 139   POTASSIUM mmol/L 4 2   CHLORIDE mmol/L 104   CO2 mmol/L 29   BUN mg/dL 24   CREATININE mg/dL 0 85   ANION GAP mmol/L 6   CALCIUM mg/dL 8 1*   GLUCOSE RANDOM mg/dL 102             Results from last 7 days   Lab Units 10/30/22  1829   HEMOGLOBIN A1C % 5 9*           Lines/Drains:  Invasive Devices  Report    Drain  Duration           External Urinary Catheter 2 days                      Imaging: No pertinent imaging reviewed      Recent Cultures (last 7 days):         Last 24 Hours Medication List:   Current Facility-Administered Medications   Medication Dose Route Frequency Provider Last Rate   • acetaminophen  975 mg Oral [facility] Kendra Cerrato PA-C     • aspirin  81 mg Oral Daily Elyse Gutierrez     • carbidopa-levodopa  1 tablet Oral TID Kendra Cerrato PA-C     • cholecalciferol  1,000 Units Oral Daily Kendra Cerrato PA-C     • enoxaparin  40 mg Subcutaneous Daily Kendra Cerrato PA-C     • ezetimibe  10 mg Oral HS Kendra Cerrato PA-C     • glycerin-hypromellose-  1 drop Both Eyes Q4H PRN Yessica NONA Lama     • lactated ringers  125 mL/hr Intravenous Continuous Kendra Cerrato PA-C Stopped (11/01/22 2112)   • levothyroxine  50 mcg Oral Early Morning Kendra Cerrato PA-C     • metoprolol tartrate  25 mg Oral Daily Kendra Cerrato PA-C     • ondansetron  4 mg Intravenous Q6H PRN Kendra Cerrato PA-C     • oxybutynin  5 mg Oral BID Yessica NONA Lama     • oxyCODONE  10 mg Oral Q4H PRN Kendra Cerrato PA-C     • oxyCODONE  5 mg Oral Q4H PRN Kendra Cerrato PA-C     • pantoprazole  20 mg Oral Early Morning Kendra Cerrato PA-C     • Pimavanserin Tartrate  34 mg Oral HS Kendra Cerrato PA-C     • polyethylene glycol  17 g Oral Daily PRN Kendra Cerrato PA-C     • pravastatin  20 mg Oral Daily With Lucas Nichols PA-C     • senna-docusate sodium  2 tablet Oral BID BOLIVAR Horowitz          Today, Patient Was Seen By: Erlin Felton    **Please Note: This note may have been constructed using a voice recognition system  **

## 2022-11-03 NOTE — PLAN OF CARE
Problem: Potential for Falls  Goal: Patient will remain free of falls  Description: INTERVENTIONS:  - Educate patient/family on patient safety including physical limitations  - Instruct patient to call for assistance with activity   - Consult OT/PT to assist with strengthening/mobility   - Keep Call bell within reach  - Keep bed low and locked with side rails adjusted as appropriate  - Keep care items and personal belongings within reach  - Initiate and maintain comfort rounds  - Make Fall Risk Sign visible to staff  - Offer Toileting every   Hours, in advance of need  - Initiate/Maintain   alarm  - Obtain necessary fall risk management equipment:      - Apply yellow socks and bracelet for high fall risk patients  - Consider moving patient to room near nurses station  Outcome: Progressing     Problem: MOBILITY - ADULT  Goal: Maintain or return to baseline ADL function  Description: INTERVENTIONS:  - Educate patient/family on patient safety including physical limitations  - Instruct patient to call for assistance with activity   - Consult OT/PT to assist with strengthening/mobility   - Keep Call bell within reach  - Keep bed low and locked with side rails adjusted as appropriate  - Keep care items and personal belongings within reach  - Initiate and maintain comfort rounds  - Make Fall Risk Sign visible to staff  - Offer Toileting every   Hours, in advance of need  - Initiate/Maintain   alarm  - Obtain necessary fall risk management equipment:      - Apply yellow socks and bracelet for high fall risk patients  - Consider moving patient to room near nurses station  Outcome: Progressing  Goal: Maintains/Returns to pre admission functional level  Description: INTERVENTIONS:  - Perform BMAT or MOVE assessment daily    - Set and communicate daily mobility goal to care team and patient/family/caregiver  - Collaborate with rehabilitation services on mobility goals if consulted  - Perform Range of Motion 2 times a day    - Reposition patient every 2 hours  - Dangle patient 2 times a day  - Stand patient 2 times a day  - Ambulate patient 2 times a day  - Out of bed to chair 2 times a day   - Out of bed for meals 2 times a day  - Out of bed for toileting  - Record patient progress and toleration of activity level   Outcome: Progressing     Problem: PAIN - ADULT  Goal: Verbalizes/displays adequate comfort level or baseline comfort level  Description: Interventions:  - Encourage patient to monitor pain and request assistance  - Assess pain using appropriate pain scale  - Administer analgesics based on type and severity of pain and evaluate response  - Implement non-pharmacological measures as appropriate and evaluate response  - Consider cultural and social influences on pain and pain management  - Notify physician/advanced practitioner if interventions unsuccessful or patient reports new pain  Outcome: Progressing     Problem: SAFETY ADULT  Goal: Patient will remain free of falls  Description: INTERVENTIONS:  - Educate patient/family on patient safety including physical limitations  - Instruct patient to call for assistance with activity   - Consult OT/PT to assist with strengthening/mobility   - Keep Call bell within reach  - Keep bed low and locked with side rails adjusted as appropriate  - Keep care items and personal belongings within reach  - Initiate and maintain comfort rounds  - Make Fall Risk Sign visible to staff  - Offer Toileting every   Hours, in advance of need  - Initiate/Maintain   alarm  - Obtain necessary fall risk management equipment:      - Apply yellow socks and bracelet for high fall risk patients  - Consider moving patient to room near nurses station  Outcome: Progressing  Goal: Maintain or return to baseline ADL function  Description: INTERVENTIONS:  - Educate patient/family on patient safety including physical limitations  - Instruct patient to call for assistance with activity   - Consult OT/PT to assist with strengthening/mobility   - Keep Call bell within reach  - Keep bed low and locked with side rails adjusted as appropriate  - Keep care items and personal belongings within reach  - Initiate and maintain comfort rounds  - Make Fall Risk Sign visible to staff  - Offer Toileting every   Hours, in advance of need  - Initiate/Maintain   alarm  - Obtain necessary fall risk management equipment:      - Apply yellow socks and bracelet for high fall risk patients  - Consider moving patient to room near nurses station  Outcome: Progressing  Goal: Maintains/Returns to pre admission functional level  Description: INTERVENTIONS:  - Perform BMAT or MOVE assessment daily    - Set and communicate daily mobility goal to care team and patient/family/caregiver  - Collaborate with rehabilitation services on mobility goals if consulted  - Perform Range of Motion 2 times a day  - Reposition patient every 2 hours    - Dangle patient 2 times a day  - Stand patient 2 times a day  - Ambulate patient 2 times a day  - Out of bed to chair 2 times a day   - Out of bed for meals 2 times a day  - Out of bed for toileting  - Record patient progress and toleration of activity level   Outcome: Progressing     Problem: DISCHARGE PLANNING  Goal: Discharge to home or other facility with appropriate resources  Description: INTERVENTIONS:  - Identify barriers to discharge w/patient and caregiver  - Arrange for needed discharge resources and transportation as appropriate  - Identify discharge learning needs (meds, wound care, etc )  - Arrange for interpretive services to assist at discharge as needed  - Refer to Case Management Department for coordinating discharge planning if the patient needs post-hospital services based on physician/advanced practitioner order or complex needs related to functional status, cognitive ability, or social support system  Outcome: Progressing     Problem: Knowledge Deficit  Goal: Patient/family/caregiver demonstrates understanding of disease process, treatment plan, medications, and discharge instructions  Description: Complete learning assessment and assess knowledge base    Interventions:  - Provide teaching at level of understanding  - Provide teaching via preferred learning methods  Outcome: Progressing     Problem: Prexisting or High Potential for Compromised Skin Integrity  Goal: Skin integrity is maintained or improved  Description: INTERVENTIONS:  - Identify patients at risk for skin breakdown  - Assess and monitor skin integrity  - Assess and monitor nutrition and hydration status  - Monitor labs   - Assess for incontinence   - Turn and reposition patient  - Assist with mobility/ambulation  - Relieve pressure over bony prominences  - Avoid friction and shearing  - Provide appropriate hygiene as needed including keeping skin clean and dry  - Evaluate need for skin moisturizer/barrier cream  - Collaborate with interdisciplinary team   - Patient/family teaching  - Consider wound care consult   Outcome: Progressing

## 2022-11-03 NOTE — CASE MANAGEMENT
Case Management Discharge Planning Note    Patient name Mitch Banuelos  Location /-48 MRN [id number]  : 1950 Date 11/3/2022       Current Admission Date: 10/30/2022  Current Admission Diagnosis:Closed fracture dislocation of left ankle   Patient Active Problem List    Diagnosis Date Noted   • Frequent urination 2022   • Fall down steps 10/30/2022   • Closed fracture dislocation of left ankle 10/30/2022   • Parkinson's disease ([facility] ) 10/30/2022   • Hypertensive urgency 10/30/2022   • Gastro-esophageal reflux disease without esophagitis 01/10/2022   • Chronic obstructive pulmonary disease ([facility] ) 2021   • Acquired hypothyroidism 2015      LOS (days): 3  Geometric Mean LOS (GMLOS) (days): 2 70  Days to GMLOS:-0 5     OBJECTIVE:  Risk of Unplanned Readmission Score: 12 39         Current admission status: Inpatient   Preferred Pharmacy:   [facility] [address] KAYLA ANDERSON [address]  Phone: [phone] Fax: [phone]    Primary Care Provider: Guerita Mar DO    Primary Insurance: [facility]  Secondary Insurance:     DISCHARGE DETAILS:    Discharge planning discussed with[de-identified] pateint and spouse  Freedom of Choice: Yes  Comments - Freedom of Choice: [facility] [address] [place] [address] contacted family/caregiver?: Yes  Were Treatment Team discharge recommendations reviewed with patient/caregiver?: Yes  Did patient/caregiver verbalize understanding of patient care needs?: Yes  Were patient/caregiver advised of the risks associated with not following Treatment Team discharge recommendations?: Yes    Contacts  Patient Contacts: Sajan Marquez spouse  Relationship to Patient[de-identified] Family  Contact Method:  In Person  Reason/Outcome: Discharge [address]         Is the patient interested in [facility] at discharge?: No    DME Referral Provided  Referral made for DME?: No    Other Referral/Resources/Interventions Provided:  Interventions: SNF  Referral Comments: [facility]    Would you like to participate in our [address] service program?  : No - Declined    Treatment Team Recommendation: SNF  Discharge Destination Plan[de-identified] SNF        Accepting Facility Name, [address] : [facility]  Receiving Facility/Agency Phone Number: [phone]  Facility/Agency Fax Number: [phone]        CM met with patient and spouse to discuss Peer to Peer Option vs SNF  CM informed patient [facility] does not take her insurance, [facility] concerned for cost of Parkinsons Medications but [address] [place] [address] agreed to accept patient  Patient happy with [facility]  and agreeable  CM updated providers in [facility]  CM submitted insurance [employer] for [facility]

## 2022-11-03 NOTE — CASE MANAGEMENT
Case Management Discharge Planning Note    Patient name Delos Closs  Location /-44 MRN [id number]  : 1950 Date 11/3/2022       Current Admission Date: 10/30/2022  Current Admission Diagnosis:Closed fracture dislocation of left ankle   Patient Active Problem List    Diagnosis Date Noted   • Frequent urination 2022   • Fall down steps 10/30/2022   • Closed fracture dislocation of left ankle 10/30/2022   • Parkinson's disease ([facility] ) 10/30/2022   • Hypertensive urgency 10/30/2022   • Gastro-esophageal reflux disease without esophagitis 01/10/2022   • Chronic obstructive pulmonary disease ([facility] ) 2021   • Acquired hypothyroidism 2015      LOS (days): 3  Geometric Mean LOS (GMLOS) (days): 2 70  Days to GMLOS:-0 4     OBJECTIVE:  Risk of Unplanned Readmission Score: 12 5         Current admission status: Inpatient   Preferred Pharmacy:   [facility] [address] KAYLA ANDERSON [address]  Phone: [phone] Fax: [phone]    Primary Care Provider: Gely Carr DO    Primary Insurance: [facility]  Secondary Insurance:     DISCHARGE DETAILS:     CM met with patient to review Acute Rehab denial, ability to complete Peer to Peer by tomorrow or pursue SNF  Patient desires to review decision with spouse who is on way in but agreed to SNF referrals in interim to [address] [place] [address]  and [facility]  CM submitted [facility] referrals as requested by patient for SNF  CM will meet with spouse when he arrives at [address] [place] [address]

## 2022-11-03 NOTE — PLAN OF CARE
Problem: Potential for Falls  Goal: Patient will remain free of falls  Description: INTERVENTIONS:  - Educate patient/family on patient safety including physical limitations  - Instruct patient to call for assistance with activity   - Consult OT/PT to assist with strengthening/mobility   - Keep Call bell within reach  - Keep bed low and locked with side rails adjusted as appropriate  - Keep care items and personal belongings within reach  - Initiate and maintain comfort rounds  - Make Fall Risk Sign visible to staff  - Offer Toileting every   Hours, in advance of need  - Initiate/Maintain   alarm  - Obtain necessary fall risk management equipment:      - Apply yellow socks and bracelet for high fall risk patients  - Consider moving patient to room near nurses station  Outcome: Progressing     Problem: MOBILITY - ADULT  Goal: Maintain or return to baseline ADL function  Description: INTERVENTIONS:  - Educate patient/family on patient safety including physical limitations  - Instruct patient to call for assistance with activity   - Consult OT/PT to assist with strengthening/mobility   - Keep Call bell within reach  - Keep bed low and locked with side rails adjusted as appropriate  - Keep care items and personal belongings within reach  - Initiate and maintain comfort rounds  - Make Fall Risk Sign visible to staff  - Offer Toileting every   Hours, in advance of need  - Initiate/Maintain   alarm  - Obtain necessary fall risk management equipment:      - Apply yellow socks and bracelet for high fall risk patients  - Consider moving patient to room near nurses station  Outcome: Progressing  Goal: Maintains/Returns to pre admission functional level  Description: INTERVENTIONS:  - Perform BMAT or MOVE assessment daily    - Set and communicate daily mobility goal to care team and patient/family/caregiver     - Collaborate with rehabilitation services on mobility goals if consulted  - Out of bed for toileting  - Record patient progress and toleration of activity level   Outcome: Progressing     Problem: PAIN - ADULT  Goal: Verbalizes/displays adequate comfort level or baseline comfort level  Description: Interventions:  - Encourage patient to monitor pain and request assistance  - Assess pain using appropriate pain scale  - Administer analgesics based on type and severity of pain and evaluate response  - Implement non-pharmacological measures as appropriate and evaluate response  - Consider cultural and social influences on pain and pain management  - Notify physician/advanced practitioner if interventions unsuccessful or patient reports new pain  Outcome: Progressing     Problem: SAFETY ADULT  Goal: Patient will remain free of falls  Description: INTERVENTIONS:  - Educate patient/family on patient safety including physical limitations  - Instruct patient to call for assistance with activity   - Consult OT/PT to assist with strengthening/mobility   - Keep Call bell within reach  - Keep bed low and locked with side rails adjusted as appropriate  - Keep care items and personal belongings within reach  - Initiate and maintain comfort rounds  - Make Fall Risk Sign visible to staff  - Offer Toileting every   Hours, in advance of need  - Initiate/Maintain   alarm  - Obtain necessary fall risk management equipment:      - Apply yellow socks and bracelet for high fall risk patients  - Consider moving patient to room near nurses station  Outcome: Progressing  Goal: Maintain or return to baseline ADL function  Description: INTERVENTIONS:  - Educate patient/family on patient safety including physical limitations  - Instruct patient to call for assistance with activity   - Consult OT/PT to assist with strengthening/mobility   - Keep Call bell within reach  - Keep bed low and locked with side rails adjusted as appropriate  - Keep care items and personal belongings within reach  - Initiate and maintain comfort rounds  - Make Fall Risk Sign visible to staff  - Offer Toileting every   Hours, in advance of need  - Initiate/Maintain   alarm  - Obtain necessary fall risk management equipment:      - Apply yellow socks and bracelet for high fall risk patients  - Consider moving patient to room near nurses station  Outcome: Progressing  Goal: Maintains/Returns to pre admission functional level  Description: INTERVENTIONS:  - Perform BMAT or MOVE assessment daily    - Set and communicate daily mobility goal to care team and patient/family/caregiver  - Collaborate with rehabilitation services on mobility goals if consulted    - Out of bed for toileting  - Record patient progress and toleration of activity level   Outcome: Progressing     Problem: DISCHARGE PLANNING  Goal: Discharge to home or other facility with appropriate resources  Description: INTERVENTIONS:  - Identify barriers to discharge w/patient and caregiver  - Arrange for needed discharge resources and transportation as appropriate  - Identify discharge learning needs (meds, wound care, etc )  - Arrange for interpretive services to assist at discharge as needed  - Refer to Case Management Department for coordinating discharge planning if the patient needs post-hospital services based on physician/advanced practitioner order or complex needs related to functional status, cognitive ability, or social support system  Outcome: Progressing     Problem: Knowledge Deficit  Goal: Patient/family/caregiver demonstrates understanding of disease process, treatment plan, medications, and discharge instructions  Description: Complete learning assessment and assess knowledge base    Interventions:  - Provide teaching at level of understanding  - Provide teaching via preferred learning methods  Outcome: Progressing     Problem: Prexisting or High Potential for Compromised Skin Integrity  Goal: Skin integrity is maintained or improved  Description: INTERVENTIONS:  - Identify patients at risk for skin breakdown  - Assess and monitor skin integrity  - Assess and monitor nutrition and hydration status  - Monitor labs   - Assess for incontinence   - Turn and reposition patient  - Assist with mobility/ambulation  - Relieve pressure over bony prominences  - Avoid friction and shearing  - Provide appropriate hygiene as needed including keeping skin clean and dry  - Evaluate need for skin moisturizer/barrier cream  - Collaborate with interdisciplinary team   - Patient/family teaching  - Consider wound care consult   Outcome: Progressing

## 2022-11-03 NOTE — PROGRESS NOTES
-- Patient:  -- MRN: [id number]  -- Aidin Request ID: [id number]  -- Level of care reserved: Santino Rojas  -- Partner Reserved: [facility] [phone]  -- Clinical needs requested:  -- Geography searched: [place] [phone]  -- Start of Service:  -- Request sent: 2:30pm EDT on 11/3/2022 by Mya Garcia  -- Partner reserved: 4:14pm EDT on 11/3/2022 by Mya Garcia  -- Choice list shared: 4:01pm EDT on 11/3/2022 by Mya Garcia

## 2022-11-04 VITALS
HEART RATE: 97 BPM | OXYGEN SATURATION: 98 % | SYSTOLIC BLOOD PRESSURE: 139 MMHG | TEMPERATURE: 97.9 F | RESPIRATION RATE: 18 BRPM | BODY MASS INDEX: 38.27 KG/M2 | DIASTOLIC BLOOD PRESSURE: 76 MMHG | WEIGHT: 216 LBS | HEIGHT: 63 IN

## 2022-11-04 LAB
ANION GAP SERPL CALCULATED.3IONS-SCNC: 4 MMOL/L (ref 4–13)
BUN SERPL-MCNC: 20 MG/DL (ref 5–25)
CALCIUM SERPL-MCNC: 7.9 MG/DL (ref 8.3–10.1)
CHLORIDE SERPL-SCNC: 102 MMOL/L (ref 96–108)
CO2 SERPL-SCNC: 30 MMOL/L (ref 21–32)
CREAT SERPL-MCNC: 0.94 MG/DL (ref 0.6–1.3)
ERYTHROCYTE [DISTWIDTH] IN BLOOD BY AUTOMATED COUNT: 12.6 % (ref 11.6–15.1)
FLUAV RNA RESP QL NAA+PROBE: NEGATIVE
FLUBV RNA RESP QL NAA+PROBE: NEGATIVE
GFR SERPL CREATININE-BSD FRML MDRD: 61 ML/MIN/1.73SQ M
GLUCOSE SERPL-MCNC: 124 MG/DL (ref 65–140)
HCT VFR BLD AUTO: 32 % (ref 34.8–46.1)
HGB BLD-MCNC: 10 G/DL (ref 11.5–15.4)
MCH RBC QN AUTO: 29.8 PG (ref 26.8–34.3)
MCHC RBC AUTO-ENTMCNC: 31.3 G/DL (ref 31.4–37.4)
MCV RBC AUTO: 95 FL (ref 82–98)
PLATELET # BLD AUTO: 242 THOUSANDS/UL (ref 149–390)
PMV BLD AUTO: 10.1 FL (ref 8.9–12.7)
POTASSIUM SERPL-SCNC: 4.4 MMOL/L (ref 3.5–5.3)
RBC # BLD AUTO: 3.36 MILLION/UL (ref 3.81–5.12)
RSV RNA RESP QL NAA+PROBE: NEGATIVE
SARS-COV-2 RNA RESP QL NAA+PROBE: NEGATIVE
SODIUM SERPL-SCNC: 136 MMOL/L (ref 135–147)
WBC # BLD AUTO: 10.28 THOUSAND/UL (ref 4.31–10.16)

## 2022-11-04 RX ORDER — AMOXICILLIN 250 MG
2 CAPSULE ORAL 2 TIMES DAILY PRN
Refills: 0
Start: 2022-11-04

## 2022-11-04 RX ORDER — ENOXAPARIN SODIUM 100 MG/ML
40 INJECTION SUBCUTANEOUS DAILY
Refills: 0
Start: 2022-11-05

## 2022-11-04 RX ORDER — POLYETHYLENE GLYCOL 3350 17 G/17G
17 POWDER, FOR SOLUTION ORAL DAILY
Refills: 0
Start: 2022-11-04

## 2022-11-04 RX ORDER — OXYCODONE HYDROCHLORIDE 5 MG/1
5 TABLET ORAL EVERY 6 HOURS PRN
Qty: 6 TABLET | Refills: 0 | Status: SHIPPED | OUTPATIENT
Start: 2022-11-04

## 2022-11-04 RX ORDER — ACETAMINOPHEN 325 MG/1
975 TABLET ORAL EVERY 8 HOURS SCHEDULED
Refills: 0
Start: 2022-11-04

## 2022-11-04 RX ORDER — OXYBUTYNIN CHLORIDE 5 MG/1
5 TABLET ORAL 2 TIMES DAILY
Refills: 0
Start: 2022-11-04

## 2022-11-04 RX ADMIN — ENOXAPARIN SODIUM 40 MG: 40 INJECTION SUBCUTANEOUS at 08:10

## 2022-11-04 RX ADMIN — OXYBUTYNIN CHLORIDE 5 MG: 5 TABLET ORAL at 08:11

## 2022-11-04 RX ADMIN — CARBIDOPA AND LEVODOPA 1 TABLET: 25; 100 TABLET ORAL at 08:11

## 2022-11-04 RX ADMIN — Medication 1000 UNITS: at 08:11

## 2022-11-04 RX ADMIN — SENNOSIDES, DOCUSATE SODIUM 2 TABLET: 8.6; 5 TABLET ORAL at 08:11

## 2022-11-04 RX ADMIN — ASPIRIN 81 MG CHEWABLE TABLET 81 MG: 81 TABLET CHEWABLE at 08:11

## 2022-11-04 RX ADMIN — ACETAMINOPHEN 975 MG: 325 TABLET ORAL at 06:12

## 2022-11-04 RX ADMIN — PANTOPRAZOLE SODIUM 20 MG: 20 TABLET, DELAYED RELEASE ORAL at 06:12

## 2022-11-04 RX ADMIN — METOPROLOL TARTRATE 25 MG: 25 TABLET, FILM COATED ORAL at 08:10

## 2022-11-04 RX ADMIN — LEVOTHYROXINE SODIUM 50 MCG: 50 TABLET ORAL at 06:12

## 2022-11-04 NOTE — CASE MANAGEMENT
Case Management Discharge Planning Note    Patient name Atha Peer  Location /-43 MRN [id number]  : 1950 Date 2022       Current Admission Date: 10/30/2022  Current Admission Diagnosis:Closed fracture dislocation of left ankle   Patient Active Problem List    Diagnosis Date Noted   • Frequent urination 2022   • Fall down steps 10/30/2022   • Closed fracture dislocation of left ankle 10/30/2022   • Parkinson's disease ([facility] ) 10/30/2022   • Hypertensive urgency 10/30/2022   • Gastro-esophageal reflux disease without esophagitis 01/10/2022   • Chronic obstructive pulmonary disease ([facility] ) 2021   • Acquired hypothyroidism 2015      LOS (days): 4  Geometric Mean LOS (GMLOS) (days): 2 70  Days to GMLOS:-1 2     OBJECTIVE:  Risk of Unplanned Readmission Score: 12 52         Current admission status: Inpatient   Preferred Pharmacy:   [facility]UN[facility] [address]  Phone: [phone] Fax: [phone]    Primary Care Provider: Barry Li DO    Primary Insurance: [facility]  Secondary Insurance:     DISCHARGE DETAILS:    Discharge planning discussed with[de-identified] patient  Freedom of Choice: Yes  Comments - Freedom of Choice: [facility] [address] [place] [address] contacted family/caregiver?: Yes  Were Treatment Team discharge recommendations reviewed with patient/caregiver?: Yes  Did patient/caregiver verbalize understanding of patient care needs?: Yes  Were patient/caregiver advised of the risks associated with not following Treatment Team discharge recommendations?: Yes         Requested  [address]         Is the patient interested in [address] at discharge?: No    DME Referral Provided  Referral made for DME?: No    Other Referral/Resources/Interventions Provided:  Interventions: SNF  Referral Comments: [facility]    Would you like to participate in our [address] service program? : No - Declined    Treatment Team Recommendation: SNF  Discharge Destination Plan[de-identified] SNF  Transport at Discharge : [facility]        IMM Given (Date):: 11/04/22  IMM Given to[de-identified] Patient  Family notified[de-identified] appeal rights reviewed with patient            CM met with patient to let her know Ashlie Hurst received for [place]  Patient thankful  CM discussed transport options for patient  Discussed with patient that transportation via [facility] is not covered by insurance and patient will be billed for this service  Verbalized understanding  CM spoke to patient at the bedside, reviewed DC IMM with patient and informed that patient can stay an additional 4 hours for reconsidering appealing the discharge as the medicare rights were review on the day of discharge  Pt verbalized understanding and feels ready to go STR and does not intend to stay 4 hours to reconsider  IMM placed in bin for filing  CM submitted [employer]  transport request for patient

## 2022-11-04 NOTE — PLAN OF CARE
Problem: PHYSICAL THERAPY ADULT  Goal: Performs mobility at highest level of function for planned discharge setting  See evaluation for individualized goals  Description: Treatment/Interventions: ADL retraining, Functional transfer training, LE strengthening/ROM, Elevations, Therapeutic exercise, Endurance training, Patient/family training, Equipment eval/education, Bed mobility, Gait training, Compensatory technique education, Spoke to nursing, Spoke to case management, OT  Equipment Recommended:  (TBD by rehab)       See flowsheet documentation for full assessment, interventions and recommendations  Outcome: Progressing  Note: Prognosis: Good  Problem List: Decreased strength, Decreased range of motion, Decreased endurance, Impaired balance, Decreased mobility, Pain, Orthopedic restrictions, Decreased skin integrity  Assessment: Pt seen for PT treatment session this date with interventions consisting of transfer training, gait training, toilet transfers, seated TE, standing tolerance, and education provided as needed for safety and direction to improve functional mobility and activity tolerance  Pt agreeable to PT treatment session upon arrival, pt found sitting OOB in recliner   At end of session, pt left sitting OOB in recliner positioned for comfort with all needs in reach and chair alarm activatred  In comparison to previous session, pt with improvements in ambulation distance   Continue to recommend STR at time of d/c in order to maximize pt's functional independence and safety w/ mobility  Pt continues to be functioning below baseline level  PT will continue to see pt while here in order to address the deficits listed above and provide interventions consistent w/ POC in effort to achieve STGs    Barriers to Discharge: Inaccessible home environment, Decreased caregiver support  Barriers to Discharge Comments: pt has 18 steps to enter apartment  PT Discharge Recommendation: Post acute rehabilitation services    See flowsheet documentation for full assessment

## 2022-11-04 NOTE — ASSESSMENT & PLAN NOTE
· Patient has had frequent urination for "quite some time"   · Was recently put on oxybutynin - will now continue as inpatient (was not listed on med rec)  · Was to have appointment with urogynegology however was admitted to hospital   UA negative for UTI   · Recommend follow up with urogynecology as outpatient

## 2022-11-04 NOTE — ASSESSMENT & PLAN NOTE
· Pain, swelling inability to walk follow-up fall  · X-ray with dislocation and closed fracture left ankle (official read not available)  · ED left ankle reduced and splinted  · Pain control  · Orthopedic consult  · Bimalleolar fracture subluxation left ankle   · POD 2 - ORIF left ankle  · NWB LLE   · Neurovascular checks  · PT/OT consult -acute rehab, pending   · DVT prophylaxis Lovenox while inpatient, transition to  b i d   If discharged home total 21 days postop

## 2022-11-04 NOTE — PHYSICAL THERAPY NOTE
PHYSICAL THERAPY TREATMENT NOTE  NAME:  Rupa Wilson  DATE: 11/04/22    Length Of Stay: 4  Performed at least 2 patient identifiers during session: Name and ID bracelet    TREATMENT FLOWSHEET:    11/04/22 0937   PT Last Visit   PT Visit Date 11/04/22   Note Type   Note Type Treatment   Pain Assessment   Pain Assessment Tool 0-10   Pain Score 6   Pain Location/Orientation Orientation: Left; Location: Foot  (when hanging down for a period of time)   Pain Onset/Description Descriptor: Burning   Patient's Stated Pain Goal No pain   Hospital Pain Intervention(s) Repositioned; Ambulation/increased activity; Emotional support   Restrictions/Precautions   Weight Bearing Precautions Per Order Yes   LLE Weight Bearing Per Order NWB   Braces or Orthoses Splint   Other Precautions Chair Alarm; Bed Alarm; Fall Risk;Pain;WBS   General   Chart Reviewed Yes   Response to Previous Treatment Patient with no complaints from previous session  Family/Caregiver Present No   Cognition   Overall Cognitive Status WFL   Arousal/Participation Alert; Cooperative   Attention Within functional limits   Orientation Level Oriented X4   Memory Within functional limits   Following Commands Follows all commands and directions without difficulty   Subjective   Subjective " My insurance company won't allow me to go to ARU because they think I can't handle the amount of rehab, but they don't know me, and I know I can "   Bed Mobility   Additional Comments Pt  seated OOB in recliner upon entering room  Transfers   Sit to Stand 4  Minimal assistance   Additional items Assist x 1; Armrests; Increased time required;Verbal cues  (RW)   Stand to Sit 4  Minimal assistance   Additional items Assist x 1; Armrests; Increased time required;Verbal cues   Stand pivot 4  Minimal assistance   Additional items Assist x 1; Armrests; Increased time required;Verbal cues  (RW with L knee sling attachment)   Toilet transfer 4  Minimal assistance   Additional items Assist x 1;Increased time required;Verbal cues;Standard toilet  (RW with L Knee sling attachment)   Ambulation/Elevation   Gait pattern Improper Weight shift;Decreased foot clearance; Excessively slow; Short stride   Gait Assistance   (steadying assist)   Additional items Assist x 1;Verbal cues   Assistive Device Rolling walker  (L knee sling attachment)   Distance 20 ftx2   Balance   Static Sitting Normal   Dynamic Sitting Good   Static Standing Fair +   Dynamic Standing Fair   Ambulatory Fair -  (with RW and L knee sling attachment)   Endurance Deficit   Endurance Deficit Yes   Activity Tolerance   Activity Tolerance Patient limited by fatigue;Patient limited by pain   Medical Staff Made Aware PCA made aware   Nurse Made Aware RN made aware   Exercises   Quad Sets Sitting;20 reps;AROM; Bilateral   Heelslides Sitting;20 reps;AROM; Bilateral   Glute Sets Sitting;20 reps;AROM; Bilateral   Hip Flexion Sitting;20 reps;AROM; Bilateral   Hip Abduction Sitting;20 reps;AROM; Bilateral   Hip Adduction Sitting;20 reps;AROM; Bilateral   Knee AROM Long Arc Quad Sitting;20 reps;AROM; Bilateral   Ankle Pumps Sitting;20 reps;AROM; Bilateral   Marching Sitting;20 reps;AROM; Bilateral   Assessment   Prognosis Good   Problem List Decreased strength;Decreased range of motion;Decreased endurance; Impaired balance;Decreased mobility;Pain;Orthopedic restrictions;Decreased skin integrity   Goals   Patient Goals to be able to go to ARU   PT Treatment Day 2   Plan   Treatment/Interventions Functional transfer training;LE strengthening/ROM; Therapeutic exercise; Endurance training;Elevations; Patient/family training;Equipment eval/education; Bed mobility;Gait training;Spoke to nursing   Progress Progressing toward goals   PT Frequency 3-5x/wk   Recommendation   PT Discharge Recommendation Post acute rehabilitation services   AM-PAC Basic Mobility Inpatient   Turning in Bed Without Bedrails 3   Lying on Back to Sitting on Edge of Flat Bed 3   Moving Bed to Chair 3 Standing Up From Chair 3   Walk in Room 3   Climb 3-5 Stairs 1   Basic Mobility Inpatient Raw Score 16   Basic Mobility Standardized Score 38 32   Highest Level Of Mobility   -HL Goal 5: Stand one or more mins   -HLM Achieved 6: Walk 10 steps or more       The patient's AM-PAC Basic Mobility Inpatient Short Form Raw Score is 16, Standardized Score is 38 32  A standardized score less than 42 9 suggests the patient may benefit from discharge to post-acute rehabilitation services  Please also refer to the recommendation of the Physical Therapist for safe discharge planning  Pt seen for PT treatment session this date with interventions consisting of transfer training, gait training, toilet transfers, seated TE, standing tolerance, and education provided as needed for safety and direction to improve functional mobility and activity tolerance  Pt agreeable to PT treatment session upon arrival, pt found sitting OOB in recliner   At end of session, pt left sitting OOB in recliner positioned for comfort with all needs in reach and chair alarm activatred  In comparison to previous session, pt with improvements in ambulation distance   Continue to recommend STR at time of d/c in order to maximize pt's functional independence and safety w/ mobility  Pt continues to be functioning below baseline level  PT will continue to see pt while here in order to address the deficits listed above and provide interventions consistent w/ POC in effort to achieve STGs      Charis Durán

## 2022-11-04 NOTE — CASE MANAGEMENT
Jose Raul Jacobson has received approved authorization from:   [facility] in by Rep: Cierra Kramer  P# [phone]    Authorization received for: [facility]  Facility: [facility] #: [id number]  Start of Care: 11/4  Next Review Date: 2 business days  Submit next review to: [address] [place] [address] Michael Ramírez notified: Percy Jenkins

## 2022-11-04 NOTE — DISCHARGE SUMMARY
[address]  Discharge- Rayne Aguilar 1950, 70 y o  female MRN: [id number]  Unit/Bed#: -01 Encounter: [id number]  Primary Care Provider: Calvin Murphy DO   Date and time admitted to hospital: 10/30/2022  1:29 PM    * Closed fracture dislocation of left ankle  Assessment & Plan  · Pain, swelling inability to walk follow-up fall  · X-ray with dislocation and closed fracture left ankle (official read not available)  · ED left ankle reduced and splinted  · Pain control  · Orthopedic consult  · Bimalleolar fracture subluxation left ankle   · POD 2 - ORIF left ankle  · NWB LLE   · Neurovascular checks  · PT/OT consult -acute rehab, pending   · DVT prophylaxis Lovenox while inpatient, transition to  b i d  If discharged home total 21 days postop    Fall down steps  Assessment & Plan  · Presents with left ankle pain and swelling after fall down 2 cement steps twisting left ankle, unable to walk after    Not on anticoagulation  · X-ray with closed fracture and dislocation of left ankle  · Ortho consult  · PT/OT consult :  Recommending rehab> [facility]      Frequent urination  Assessment & Plan  · Patient has had frequent urination for "quite some time"   · Was recently put on oxybutynin - will now continue as inpatient (was not listed on med rec)  · Was to have appointment with urogynegology however was admitted to hospital   UA negative for UTI   · Recommend follow up with urogynecology as outpatient     Hypertensive urgency  Assessment & Plan  · Noted intermittent 180s-190s in ED and on arrival to unit  · PTA Metoprolol tartrate 25 mg daily  · PRN pain control  · Prn lopressor 5mg    Resolved - BP controlled with home medications      Parkinson's disease ([facility] [address] )  Assessment & Plan  · Continue PTA Sinemet    Gastro-esophageal reflux disease without esophagitis  Assessment & Plan  · PTA omeprazole    Acquired hypothyroidism  Assessment & Plan  · Continue PTA levothyroxine  · reports frequent dry mouth and voiding  · TSH wnl   · HgbA1C - prediabetic range - counseled       Medical Problems             Resolved Problems  Date Reviewed: 11/4/2022   None               Discharging Physician / Practitioner: Beto Braga  PCP: Jacy Jimenez DO  Admission Date:   Admission Orders (From admission, onward)     Ordered        10/31/22 1231  Inpatient Admission  Once            10/30/22 1527  Place in Observation  Once                      Discharge Date: 11/04/22    Consultations During Hospital Stay:  PT/OT  Case management  Orthopedic surgery  Procedures Performed:   · X-ray Left ankle   · acute displaced angulated fracture of distal fibula potential fracture posterior tibial malleolus, marked anterior medial displacement of tibial plateau    · X-ray ankle left-interval reduction of tibiotalar joint dislocation, again seen acute posterior and lateral malleolar fractures and old well corticated avulsion fracture fragment off the medial malleolus    · OR 10/31: ORIF half left ankle    Significant Findings / Test Results:   ·     Incidental Findings:   · None    Test Results Pending at Discharge (will require follow up): · None     Outpatient Tests Requested:  · None    Complications:  None    Reason for Admission:  Fall with closed fracture dislocation of left ankle    Hospital Course:   Rio Gonzales is a 70 y o  female patient who originally presented to the hospital on 10/30/2022 due to mechanical fall leaving [employer] down 2 steps, patient presented with inability to bear weight on left ankle, pain and swelling  Found to have left ankle closed fracture and dislocation dislocation was reduced in ER under moderate sedation patient tolerated well  Was taken to the OR with Orthopedic surgery on 10/31 for ORIF  Patient is to continue DVT prophylaxis for total 21 days may remain on Lovenox while at SNF and transition to aspirin 325 b i d   If discharged home through 11/21  Patient is nonweightbearing on left lower extremity until directed otherwise by Orthopedic surgery  Recommend patient follow-up with urogynecologist as she missed her appointment due to hospitalization and is continuing to have frequent urination without evidence of cystitis on UA  Continue current medication regimen  Please see above list of diagnoses and related plan for additional information  Condition at Discharge: stable    Discharge Day Visit / Exam:   Subjective:  Evaluation done when patient pitting to chair utilized walker and is to remain nonweightbearing on left lower extremity until directed otherwise by Orthopedic surgery  Discussed discharge today patient agrees additionally discussed recommendations and follow-up patient acknowledges  Vitals: Blood Pressure: 139/76 (11/04/22 0628)  Pulse: 97 (11/04/22 0810)  Temperature: 97 9 °F (36 6 °C) (11/04/22 0628)  Temp Source: Temporal (10/31/22 1953)  Respirations: 18 (11/04/22 0628)  Height: 5' 3" (160 cm) (10/31/22 1541)  Weight - Scale: 98 kg (216 lb) (10/31/22 1541)  SpO2: 98 % (11/04/22 6490)  Exam:   Physical Exam  Vitals and nursing note reviewed  Constitutional:       General: She is not in acute distress  Appearance: She is well-developed  HENT:      Head: Normocephalic and atraumatic  Mouth/Throat:      Mouth: Mucous membranes are moist    Eyes:      Conjunctiva/sclera: Conjunctivae normal       Pupils: Pupils are equal, round, and reactive to light  Cardiovascular:      Rate and Rhythm: Normal rate and regular rhythm  Pulses: Normal pulses  Heart sounds: Normal heart sounds  No murmur heard  Pulmonary:      Effort: Pulmonary effort is normal  No respiratory distress  Breath sounds: Normal breath sounds  Abdominal:      General: Bowel sounds are normal  There is no distension  Palpations: Abdomen is soft  Tenderness: There is no abdominal tenderness     Musculoskeletal: General: Swelling (LLE Sensation/pulses intact, cap refill less than 3, able to wiggle toes, extremity warm) present  Cervical back: Neck supple  Skin:     General: Skin is warm and dry  Neurological:      Mental Status: She is alert  Discussion with Family: Updated  () at bedside  Discharge instructions/Information to patient and family:   See after visit summary for information provided to patient and family  Provisions for Follow-Up Care:  See after visit summary for information related to follow-up care and any pertinent home health orders  Disposition:   [facility]    Planned Readmission: none     Discharge Statement:  I spent 45 minutes discharging the patient  This time was spent on the day of discharge  I had direct contact with the patient on the day of discharge  Greater than 50% of the total time was spent examining patient, answering all patient questions, arranging and discussing plan of care with patient as well as directly providing post-discharge instructions  Additional time then spent on discharge activities  Discharge Medications:  See after visit summary for reconciled discharge medications provided to patient and/or family        **Please Note: This note may have been constructed using a voice recognition system**

## 2022-11-04 NOTE — ASSESSMENT & PLAN NOTE
· Presents with left ankle pain and swelling after fall down 2 cement steps twisting left ankle, unable to walk after    Not on anticoagulation  · X-ray with closed fracture and dislocation of left ankle  · Ortho consult  · PT/OT consult :  Recommending rehab> Alex Garcia

## 2022-11-04 NOTE — NURSING NOTE
Patient to be transferred to [facility], called report to Isai   Informed that there will be a narc script in chart and that  will be bringing one of her home meds over with him so it doesn't get lost  Informed of 1300

## 2022-11-04 NOTE — CASE MANAGEMENT
Case Management Discharge Planning Note    Patient name Corrine Numbers  Location /-76 MRN [id number]  : 1950 Date 2022       Current Admission Date: 10/30/2022  Current Admission Diagnosis:Closed fracture dislocation of left ankle   Patient Active Problem List    Diagnosis Date Noted   • Frequent urination 2022   • Fall down steps 10/30/2022   • Closed fracture dislocation of left ankle 10/30/2022   • Parkinson's disease ([facility] ) 10/30/2022   • Hypertensive urgency 10/30/2022   • Gastro-esophageal reflux disease without esophagitis 01/10/2022   • Chronic obstructive pulmonary disease ([facility] ) 2021   • Acquired hypothyroidism 2015      LOS (days): 4  Geometric Mean LOS (GMLOS) (days): 2 70  Days to GMLOS:-1 2     OBJECTIVE:  Risk of Unplanned Readmission Score: 12 52         Current admission status: Inpatient   Preferred Pharmacy:   [facility] [address] KAYLA ANDERSON [address] [place] [address]  Phone: [phone] Fax: [phone]    Primary Care Provider: Say Pack DO    Primary Insurance: [employer] [facility] REP  Secondary Insurance:     [address] Number: [id number]

## 2022-11-07 NOTE — UTILIZATION REVIEW
NOTIFICATION OF ADMISSION DISCHARGE   This is a Notification of Discharge from [facility]  Please be advised that this patient has been discharge from our facility  Below you will find the admission and discharge date and time including the patient’s disposition  UTILIZATION REVIEW CONTACT:  [address] [place]  Utilization   Network Utilization Review Department  Phone: [phone] x carefully listen to the prompts  All voicemails are confidential   Email: [email or web address]     ADMISSION INFORMATION  PRESENTATION DATE: 10/30/2022  1:29 PM  OBERVATION ADMISSION DATE:   INPATIENT ADMISSION DATE: 10/31/22 12:31 PM   DISCHARGE DATE: 11/4/2022  1:30 PM  DISPOSITION: Non [facility] SN[facility]/SNU Non [facility] SNF/TCU/SNU      IMPORTANT INFORMATION:  Send all requests for admission clinical reviews, approved or denied determinations and any other requests to dedicated fax number below belonging to the campus where the patient is receiving treatment   List of dedicated fax numbers:  [address] DENIALS (Administrative/Medical Necessity) [phone]   [address] (Maternity/NICU/Pediatrics) [phone]   [facility] [phone]   [facility] 87 [phone]   [address] [phone]   220 [facility] [phone]   90 [facility] [phone]   [address] [place], [place] [address] [address] [phone]   [facility]  [phone]   [address] [facility] [phone]   [address] [place] [address] 850 [facility] [phone]

## 2022-11-10 ENCOUNTER — TELEPHONE (OUTPATIENT)
Dept: OBGYN CLINIC | Facility: HOSPITAL | Age: 72
End: 2022-11-10

## 2022-11-10 NOTE — TELEPHONE ENCOUNTER
Hello,  Please advise if the following patient can be forced onto the schedule:    Patient: Dorita Smith    :[birth year]    MRN: [id number]    Call back #:Fabiano [facility] [phone]    Insurance: Dock Fore    Reason for appointment: PO 10/31 L ankle ORIF 2 week PO    Requested doctor/location: Anne-Marie Benson      Thank you

## 2022-11-15 ENCOUNTER — TELEPHONE (OUTPATIENT)
Dept: OBGYN CLINIC | Facility: HOSPITAL | Age: 72
End: 2022-11-15

## 2022-11-15 NOTE — TELEPHONE ENCOUNTER
Caller: [facility]    Doctor: Fitz Mar    Reason for call: Verifying appt for patient for tomorrow      Call back#: [phone] Janette Hernandez

## 2022-11-15 NOTE — TELEPHONE ENCOUNTER
Please advise if the following patient can be forced onto the schedule:    Patient:  [place] Reasons    : 1950    MRN:     [id number]    Call back:   [address] at [facility]    Reason for appointment:  Patient had first post op today but was missed    [facility]

## 2022-11-16 ENCOUNTER — OFFICE VISIT (OUTPATIENT)
Dept: OBGYN CLINIC | Facility: CLINIC | Age: 72
End: 2022-11-16

## 2022-11-16 ENCOUNTER — HOSPITAL ENCOUNTER (OUTPATIENT)
Dept: RADIOLOGY | Facility: CLINIC | Age: 72
Discharge: HOME | End: 2022-11-16

## 2022-11-16 VITALS
TEMPERATURE: 97.4 F | BODY MASS INDEX: 37.92 KG/M2 | DIASTOLIC BLOOD PRESSURE: 78 MMHG | HEIGHT: 63 IN | HEART RATE: 93 BPM | WEIGHT: 214 LBS | SYSTOLIC BLOOD PRESSURE: 120 MMHG

## 2022-11-16 DIAGNOSIS — S82.892D CLOSED FRACTURE DISLOCATION OF LEFT ANKLE WITH ROUTINE HEALING, SUBSEQUENT ENCOUNTER: Primary | ICD-10-CM

## 2022-11-16 DIAGNOSIS — S82.892D CLOSED FRACTURE DISLOCATION OF LEFT ANKLE WITH ROUTINE HEALING, SUBSEQUENT ENCOUNTER: ICD-10-CM

## 2022-11-16 DIAGNOSIS — E66.01 MORBID OBESITY DUE TO EXCESS CALORIES (HCC): ICD-10-CM

## 2022-11-16 RX ORDER — ALUMINUM HYDROXIDE, MAGNESIUM HYDROXIDE, SIMETHICONE 400; 400; 40 MG/10ML; MG/10ML; MG/10ML
30 SUSPENSION ORAL
COMMUNITY

## 2022-11-16 RX ORDER — BISACODYL 10 MG
10 SUPPOSITORY, RECTAL RECTAL DAILY
COMMUNITY

## 2022-11-16 NOTE — PROGRESS NOTES
Patient Name:  Wing Jones  MRN:  [id number]    Assessment     1  Closed fracture dislocation of left ankle with routine healing, subsequent encounter  XR ankle 3+ vw left    Cam Boot      2  Morbid obesity due to excess calories [facility])            Plan     The patient is now 2 weeks post surgery  She is overall doing very well  X-rays taken in the office were reviewed and discussed with the patient  The patient's splint and postoperative dressings were removed and the patient was fitted with a cam boot today  Patient was instructed to maintain the Cam boot at all times, and to remain nonweightbearing on the left lower extremity  She may remove the boot only for gentle cleansing with warm soapy water  The patient may keep the incision covered with some light dressings to provide padding against the Cam boot  She will continue the Lovenox as prescribed for another 5 days  The patient will return to the office in 4 weeks for recheck and repeat x-rays  She was instructed to call or return to the office sooner if any problems arise  Return in about 4 weeks (around 12/14/2022) for Recheck  Subjective   Wing Jones returns for follow-up of her left ankle fracture  The patient is 2 week(s) post left ankle ORIF and returns for routine follow-up  Today the patient states that she is overall doing very well postoperatively  She remains in the care of the rehab facility, though notes that she is expecting to return home by the end of the week to the care of her daughter and home health aide  The patient denies any significant pain, but does report an ongoing burning sensation along the dorsum of the foot if she lets her foot hanging down in the sitting position too long  The patient denies any numbness or tingling, fever or chills  She has continued to take Lovenox while at the nursing facility as instructed  She has maintained the Cam boot as instructed as well as nonweightbearing restrictions  Patient has been ambulating with the use of a knee scooter  Objective     /78   Pulse 93   Temp (!) 97 4 °F (36 3 °C) (Temporal)   Ht 5' 3" (1 6 m)   Wt 97 1 kg (214 lb)   BMI 37 91 kg/m²     Left ankle:  - patient presents in wheelchair today  Her splint, dressings were removed without complication  - incision is well healed  There is moderate swelling along the ankle and foot consistent with postoperative course  No ecchymosis, erythema, warmth, or other signs of infection  - patient is able to wiggle all toes  - sensation intact to light touch along the DP/SP/SA/TINAJERO/T nerve distributions  - soft lower extremity compartments, negative Homans  - intact active knee range of motion  - lower extremity is well perfused, brisk cap refill in all toes      Data Review     I have personally reviewed pertinent films and reports in PACS and my interpretation is as follows:     X-rays of the left ankle taken today in office demonstrate healing fracture and intact hardware without signs of loosening or failure  Fracture alignment unchanged compared to previous imaging        Joshua Avila PA-C

## 2022-12-01 ENCOUNTER — TELEPHONE (OUTPATIENT)
Dept: OBGYN CLINIC | Facility: CLINIC | Age: 72
End: 2022-12-01

## 2022-12-01 NOTE — TELEPHONE ENCOUNTER
Yara Hill from home health is calling stating that apryl's left foot (toes) seem to be getting macerated maybe from sweating and  They are asking about whether the boot can be removed for bathing and air drying

## 2022-12-02 NOTE — TELEPHONE ENCOUNTER
Caller: [phone]     Doctor: Zulema Shoulder     Reason for call: Patient's toes are sore, when nurse looked at it they looked lacerated and moist  Patient said she did take a shower, but she put a bag over it  They are wondering what they can do  Please advise       Call back#: [phone]

## 2022-12-02 NOTE — TELEPHONE ENCOUNTER
Information above relayed to patient and daughter  They verbalized understanding  They think they will just do a sponge bath while patient is laying down elevating  They will remove the boot and cleans area and let it air dry really good before returning into CAM boot  She is aware the boot must be on for ambulation and standing

## 2022-12-02 NOTE — TELEPHONE ENCOUNTER
Caller: Rody Lyles    Doctor: Aleksandra Escobar    Reason for call: patient missed a call from the office    Transferred to the triage nurse    Call back#: N/A

## 2022-12-07 ENCOUNTER — HOSPITAL ENCOUNTER (OUTPATIENT)
Dept: RADIOLOGY | Facility: CLINIC | Age: 72
Discharge: HOME | End: 2022-12-07

## 2022-12-07 ENCOUNTER — OFFICE VISIT (OUTPATIENT)
Dept: OBGYN CLINIC | Facility: CLINIC | Age: 72
End: 2022-12-07

## 2022-12-07 VITALS
HEIGHT: 63 IN | TEMPERATURE: 97.8 F | DIASTOLIC BLOOD PRESSURE: 74 MMHG | BODY MASS INDEX: 37.92 KG/M2 | SYSTOLIC BLOOD PRESSURE: 118 MMHG | HEART RATE: 67 BPM | WEIGHT: 214 LBS

## 2022-12-07 DIAGNOSIS — S82.892D CLOSED FRACTURE DISLOCATION OF LEFT ANKLE WITH ROUTINE HEALING, SUBSEQUENT ENCOUNTER: Primary | ICD-10-CM

## 2022-12-07 DIAGNOSIS — S82.892D CLOSED FRACTURE DISLOCATION OF LEFT ANKLE WITH ROUTINE HEALING, SUBSEQUENT ENCOUNTER: ICD-10-CM

## 2022-12-07 NOTE — PROGRESS NOTES
Patient Name:  Ruthy Reasons  MRN:  [id number]    Assessment     1  Closed fracture dislocation of left ankle with routine healing, subsequent encounter  XR ankle 3+ vw left    Ambulatory Referral to Physical Therapy    Ankle Cude ankle/Ankle Brace          Plan     1  I would recommend follow-up in 3 to 4 weeks  She is to begin physical therapy and a prescription was placed in her chart  She was provided with an ankle brace and she may transition from the cam boot to the brace as tolerated  She may remove the brace for inactivity and cleansing and is permitted now to bear weight  I have encouraged her to contact me if questions or concerns arise  Return for 3 to 4 weeks  Subjective   [place] Reasons returns for follow-up of her left ankle fracture  The patient is 6 week(s) post op and returns for routine follow-up  Patient denies pain  She does note some tingling in the lateral 3 toes but denies any tingling or numbness in her foot  Objective     /74   Pulse 67   Temp 97 8 °F (36 6 °C) (Temporal)   Ht 5' 3" (1 6 m)   Wt 97 1 kg (214 lb)   BMI 37 91 kg/m²     Exam today demonstrated the cam boot in place  This was removed without difficulty  Dressings were removed  The incision is well-healed and benign in appearance  She has a few degrees of ankle dorsiflexion and about 20 degrees of plantarflexion  Sensation is grossly intact throughout the foot excluding a decreased degree of sensation to light touch in the lateral 3 toes  Good color and capillary refill is noted  Pulses are palpable  Data Review     I have personally reviewed pertinent films in PACS demonstrating excellent healing of the fracture with stable fixation      Jose Narvaez

## 2022-12-08 ENCOUNTER — TELEPHONE (OUTPATIENT)
Dept: OBGYN CLINIC | Facility: HOSPITAL | Age: 72
End: 2022-12-08

## 2022-12-08 NOTE — TELEPHONE ENCOUNTER
Caller: Patient    Doctor: Dr Gurinder Davenport    Reason for call: Patient called stating that she was provided with a brace and it is too tight  She was wondering if she could come in and replace the brace with a different size?  Patient asking to speak to clinical team     Call back#: [phone]

## 2022-12-08 NOTE — TELEPHONE ENCOUNTER
Caller: Patient    Doctor: Dr Roosevelt Lainez    Reason for call: Confirmed message provided by Dr Roosevelt Lainez that tomorrow would be a good time in to have brace looked at      Call back#: [phone]

## 2022-12-28 ENCOUNTER — OFFICE VISIT (OUTPATIENT)
Dept: OBGYN CLINIC | Facility: CLINIC | Age: 72
End: 2022-12-28

## 2022-12-28 VITALS
HEIGHT: 63 IN | WEIGHT: 214 LBS | BODY MASS INDEX: 37.92 KG/M2 | DIASTOLIC BLOOD PRESSURE: 82 MMHG | HEART RATE: 86 BPM | SYSTOLIC BLOOD PRESSURE: 120 MMHG | TEMPERATURE: 98.1 F

## 2022-12-28 DIAGNOSIS — S82.892D CLOSED FRACTURE DISLOCATION OF LEFT ANKLE WITH ROUTINE HEALING, SUBSEQUENT ENCOUNTER: Primary | ICD-10-CM

## 2022-12-28 NOTE — PROGRESS NOTES
Patient Name:  Khoa Child  MRN:  [id number]    Assessment     1  Closed fracture dislocation of left ankle with routine healing, subsequent encounter            Plan     1  I would recommend follow-up in 3 to 4 weeks  She is to continue in physical therapy and transition from the cam boot to a brace  She states she has difficulty getting her shoes on over her brace and I have given her some instructions and recommendations  She may keep the boot/brace off at home as she begins to tolerate this  However, outside the house, she is to continue using the brace  X-rays will be obtained at follow-up only if clinically indicated  Return for 3 to 4 weeks  Subjective   Khoa Child returns for follow-up of her distal left fibular fracture  The patient is 8 week(s) post surgical fixation and returns for routine follow-up  Patient complains of some mild discomfort but states she is significantly better  She is attending physical therapy and is tolerating ambulation with her cast boot in place  She has been unable to find shoes that she can wear over her brace and, therefore, has not really transitioned into the brace yet  Objective     /82 (BP Location: Left arm)   Pulse 86   Temp 98 1 °F (36 7 °C) (Temporal)   Ht 5' 3" (1 6 m)   Wt 97 1 kg (214 lb)   BMI 37 91 kg/m²     Exam today demonstrates some persistent swelling about the ankle consistent with her early postop course  The incision is healed and benign  She denies tenderness over the lateral and medial ankle  I noted to dorsiflex the ankle about 5 degrees and plantar flex 10 to 20 degrees  She has no complaints during inversion or eversion stress  Distal sensation is intact  Good color and capillary refill is noted            Leopoldo Holland

## 2023-01-25 ENCOUNTER — OFFICE VISIT (OUTPATIENT)
Dept: OBGYN CLINIC | Facility: CLINIC | Age: 73
End: 2023-01-25

## 2023-01-25 VITALS
WEIGHT: 214 LBS | TEMPERATURE: 97.4 F | DIASTOLIC BLOOD PRESSURE: 76 MMHG | SYSTOLIC BLOOD PRESSURE: 128 MMHG | HEIGHT: 63 IN | HEART RATE: 57 BPM | BODY MASS INDEX: 37.92 KG/M2

## 2023-01-25 DIAGNOSIS — S82.892D CLOSED FRACTURE DISLOCATION OF LEFT ANKLE WITH ROUTINE HEALING, SUBSEQUENT ENCOUNTER: Primary | ICD-10-CM

## 2023-01-25 DIAGNOSIS — E66.01 MORBID OBESITY DUE TO EXCESS CALORIES (HCC): ICD-10-CM

## 2023-01-25 NOTE — PROGRESS NOTES
Patient Name:  Maik Resendiz  MRN:  [id number]    Assessment     1  Closed fracture dislocation of left ankle with routine healing, subsequent encounter        2  Morbid obesity due to excess calories ([address] )            Plan     1  I would recommend follow-up as needed  She is doing well at this time and I do not see the need for further bracing  However, if she does feel more comfortable using the brace during periods of concern regarding injury she certainly may continue doing so  I have encouraged her to contact me if questions or concerns arise  Return if symptoms worsen or fail to improve  Subjective   Maik Resendiz returns for follow-up of her left ankle  The patient is 3 month(s) post ORIF and returns for routine follow-up  Patient denies any pain  She continues to note swelling and she does continue using her brace outside the house  In the house, she does not need the brace and has no pain  Physical therapy has instructed her to discontinue therapy and work on home exercise program       Objective     /76   Pulse 57   Temp (!) 97 4 °F (36 3 °C) (Temporal)   Ht 5' 3" (1 6 m)   Wt 97 1 kg (214 lb)   BMI 37 91 kg/m²     Exam demonstrates the brace in place  This was removed without difficulty  The incision is well-healed  She has dorsiflexion to about 10 degrees, plantar flexion of 30 degrees, some inversion and eversion, all without pain  She has no localized tenderness  She does continue to demonstrate some swelling  Good color and capillary refill is noted        Bishop Del Real

## 2023-11-13 ENCOUNTER — APPOINTMENT (EMERGENCY)
Dept: CT IMAGING | Facility: HOSPITAL | Age: 73
DRG: 418 | End: 2023-11-13
Payer: COMMERCIAL

## 2023-11-13 ENCOUNTER — APPOINTMENT (INPATIENT)
Dept: ULTRASOUND IMAGING | Facility: HOSPITAL | Age: 73
DRG: 418 | End: 2023-11-13
Payer: COMMERCIAL

## 2023-11-13 ENCOUNTER — HOSPITAL ENCOUNTER (INPATIENT)
Facility: HOSPITAL | Age: 73
LOS: 2 days | Discharge: HOME | DRG: 418 | End: 2023-11-15
Attending: EMERGENCY MEDICINE | Admitting: FAMILY MEDICINE
Payer: COMMERCIAL

## 2023-11-13 DIAGNOSIS — K81.0 ACUTE CHOLECYSTITIS: ICD-10-CM

## 2023-11-13 DIAGNOSIS — R10.9 ABDOMINAL PAIN: Primary | ICD-10-CM

## 2023-11-13 LAB
ALBUMIN SERPL BCP-MCNC: 4.4 G/DL (ref 3.5–5)
ALP SERPL-CCNC: 138 U/L (ref 34–104)
ALT SERPL W P-5'-P-CCNC: 40 U/L (ref 7–52)
ANION GAP SERPL CALCULATED.3IONS-SCNC: 7 MMOL/L
AST SERPL W P-5'-P-CCNC: 146 U/L (ref 13–39)
BASOPHILS # BLD AUTO: 0.03 THOUSANDS/ÂΜL (ref 0–0.1)
BASOPHILS NFR BLD AUTO: 0 % (ref 0–1)
BILIRUB SERPL-MCNC: 1.28 MG/DL (ref 0.2–1)
BUN SERPL-MCNC: 22 MG/DL (ref 5–25)
CALCIUM SERPL-MCNC: 9 MG/DL (ref 8.4–10.2)
CHLORIDE SERPL-SCNC: 104 MMOL/L (ref 96–108)
CO2 SERPL-SCNC: 27 MMOL/L (ref 21–32)
CREAT SERPL-MCNC: 0.85 MG/DL (ref 0.6–1.3)
EOSINOPHIL # BLD AUTO: 0.08 THOUSAND/ÂΜL (ref 0–0.61)
EOSINOPHIL NFR BLD AUTO: 1 % (ref 0–6)
ERYTHROCYTE [DISTWIDTH] IN BLOOD BY AUTOMATED COUNT: 12.1 % (ref 11.6–15.1)
GFR SERPL CREATININE-BSD FRML MDRD: 68 ML/MIN/1.73SQ M
GLUCOSE SERPL-MCNC: 164 MG/DL (ref 65–140)
HCT VFR BLD AUTO: 38.9 % (ref 34.8–46.1)
HGB BLD-MCNC: 12.1 G/DL (ref 11.5–15.4)
IMM GRANULOCYTES # BLD AUTO: 0.08 THOUSAND/UL (ref 0–0.2)
IMM GRANULOCYTES NFR BLD AUTO: 1 % (ref 0–2)
LACTATE SERPL-SCNC: 1 MMOL/L (ref 0.5–2)
LIPASE SERPL-CCNC: 24 U/L (ref 11–82)
LYMPHOCYTES # BLD AUTO: 1.34 THOUSANDS/ÂΜL (ref 0.6–4.47)
LYMPHOCYTES NFR BLD AUTO: 11 % (ref 14–44)
MCH RBC QN AUTO: 29.8 PG (ref 26.8–34.3)
MCHC RBC AUTO-ENTMCNC: 31.1 G/DL (ref 31.4–37.4)
MCV RBC AUTO: 96 FL (ref 82–98)
MONOCYTES # BLD AUTO: 0.93 THOUSAND/ÂΜL (ref 0.17–1.22)
MONOCYTES NFR BLD AUTO: 8 % (ref 4–12)
NEUTROPHILS # BLD AUTO: 9.43 THOUSANDS/ÂΜL (ref 1.85–7.62)
NEUTS SEG NFR BLD AUTO: 79 % (ref 43–75)
NRBC BLD AUTO-RTO: 0 /100 WBCS
PLATELET # BLD AUTO: 245 THOUSANDS/UL (ref 149–390)
PMV BLD AUTO: 10.2 FL (ref 8.9–12.7)
POTASSIUM SERPL-SCNC: 4.1 MMOL/L (ref 3.5–5.3)
PROT SERPL-MCNC: 6.3 G/DL (ref 6.4–8.4)
RBC # BLD AUTO: 4.06 MILLION/UL (ref 3.81–5.12)
SODIUM SERPL-SCNC: 138 MMOL/L (ref 135–147)
WBC # BLD AUTO: 11.89 THOUSAND/UL (ref 4.31–10.16)

## 2023-11-13 PROCEDURE — 85025 COMPLETE CBC W/AUTO DIFF WBC: CPT | Performed by: EMERGENCY MEDICINE

## 2023-11-13 PROCEDURE — 99285 EMERGENCY DEPT VISIT HI MDM: CPT | Performed by: EMERGENCY MEDICINE

## 2023-11-13 PROCEDURE — 87154 CUL TYP ID BLD PTHGN 6+ TRGT: CPT | Performed by: EMERGENCY MEDICINE

## 2023-11-13 PROCEDURE — 87040 BLOOD CULTURE FOR BACTERIA: CPT | Performed by: EMERGENCY MEDICINE

## 2023-11-13 PROCEDURE — 76705 ECHO EXAM OF ABDOMEN: CPT

## 2023-11-13 PROCEDURE — 99223 1ST HOSP IP/OBS HIGH 75: CPT

## 2023-11-13 PROCEDURE — 36415 COLL VENOUS BLD VENIPUNCTURE: CPT | Performed by: EMERGENCY MEDICINE

## 2023-11-13 PROCEDURE — 74177 CT ABD & PELVIS W/CONTRAST: CPT

## 2023-11-13 PROCEDURE — G1004 CDSM NDSC: HCPCS

## 2023-11-13 PROCEDURE — 99284 EMERGENCY DEPT VISIT MOD MDM: CPT

## 2023-11-13 PROCEDURE — 96375 TX/PRO/DX INJ NEW DRUG ADDON: CPT

## 2023-11-13 PROCEDURE — 83605 ASSAY OF LACTIC ACID: CPT | Performed by: EMERGENCY MEDICINE

## 2023-11-13 PROCEDURE — 96374 THER/PROPH/DIAG INJ IV PUSH: CPT

## 2023-11-13 PROCEDURE — 83690 ASSAY OF LIPASE: CPT | Performed by: EMERGENCY MEDICINE

## 2023-11-13 PROCEDURE — 80053 COMPREHEN METABOLIC PANEL: CPT | Performed by: EMERGENCY MEDICINE

## 2023-11-13 RX ORDER — HEPARIN SODIUM 5000 [USP'U]/ML
5000 INJECTION, SOLUTION INTRAVENOUS; SUBCUTANEOUS EVERY 8 HOURS SCHEDULED
Status: DISCONTINUED | OUTPATIENT
Start: 2023-11-13 | End: 2023-11-15 | Stop reason: HOSPADM

## 2023-11-13 RX ORDER — ACETAMINOPHEN 325 MG/1
975 TABLET ORAL EVERY 8 HOURS SCHEDULED
Status: DISCONTINUED | OUTPATIENT
Start: 2023-11-13 | End: 2023-11-15 | Stop reason: HOSPADM

## 2023-11-13 RX ORDER — SODIUM CHLORIDE, SODIUM LACTATE, POTASSIUM CHLORIDE, CALCIUM CHLORIDE 600; 310; 30; 20 MG/100ML; MG/100ML; MG/100ML; MG/100ML
150 INJECTION, SOLUTION INTRAVENOUS CONTINUOUS
Status: DISCONTINUED | OUTPATIENT
Start: 2023-11-13 | End: 2023-11-13

## 2023-11-13 RX ORDER — ONDANSETRON 2 MG/ML
4 INJECTION INTRAMUSCULAR; INTRAVENOUS EVERY 6 HOURS PRN
Status: DISCONTINUED | OUTPATIENT
Start: 2023-11-13 | End: 2023-11-14 | Stop reason: SDUPTHER

## 2023-11-13 RX ORDER — LEVOTHYROXINE SODIUM 0.05 MG/1
50 TABLET ORAL
Status: DISCONTINUED | OUTPATIENT
Start: 2023-11-14 | End: 2023-11-15 | Stop reason: HOSPADM

## 2023-11-13 RX ORDER — EZETIMIBE 10 MG/1
10 TABLET ORAL
Status: DISCONTINUED | OUTPATIENT
Start: 2023-11-13 | End: 2023-11-14

## 2023-11-13 RX ORDER — ONDANSETRON 2 MG/ML
4 INJECTION INTRAMUSCULAR; INTRAVENOUS ONCE
Status: COMPLETED | OUTPATIENT
Start: 2023-11-13 | End: 2023-11-13

## 2023-11-13 RX ORDER — SODIUM CHLORIDE, SODIUM GLUCONATE, SODIUM ACETATE, POTASSIUM CHLORIDE, MAGNESIUM CHLORIDE, SODIUM PHOSPHATE, DIBASIC, AND POTASSIUM PHOSPHATE .53; .5; .37; .037; .03; .012; .00082 G/100ML; G/100ML; G/100ML; G/100ML; G/100ML; G/100ML; G/100ML
75 INJECTION, SOLUTION INTRAVENOUS CONTINUOUS
Status: DISCONTINUED | OUTPATIENT
Start: 2023-11-13 | End: 2023-11-15

## 2023-11-13 RX ORDER — HYDRALAZINE HYDROCHLORIDE 20 MG/ML
5 INJECTION INTRAMUSCULAR; INTRAVENOUS EVERY 6 HOURS PRN
Status: DISCONTINUED | OUTPATIENT
Start: 2023-11-13 | End: 2023-11-15 | Stop reason: HOSPADM

## 2023-11-13 RX ORDER — PANTOPRAZOLE SODIUM 40 MG/10ML
40 INJECTION, POWDER, LYOPHILIZED, FOR SOLUTION INTRAVENOUS
Status: DISCONTINUED | OUTPATIENT
Start: 2023-11-14 | End: 2023-11-15 | Stop reason: HOSPADM

## 2023-11-13 RX ORDER — MELATONIN
1000 DAILY
Status: DISCONTINUED | OUTPATIENT
Start: 2023-11-14 | End: 2023-11-15 | Stop reason: HOSPADM

## 2023-11-13 RX ORDER — ASPIRIN 81 MG/1
81 TABLET, CHEWABLE ORAL DAILY
Status: CANCELLED | OUTPATIENT
Start: 2023-11-14

## 2023-11-13 RX ADMIN — METOPROLOL TARTRATE 25 MG: 25 TABLET, FILM COATED ORAL at 23:09

## 2023-11-13 RX ADMIN — CARBIDOPA AND LEVODOPA 1 TABLET: 25; 100 TABLET ORAL at 23:08

## 2023-11-13 RX ADMIN — ACETAMINOPHEN 975 MG: 325 TABLET, FILM COATED ORAL at 23:08

## 2023-11-13 RX ADMIN — HEPARIN SODIUM 5000 UNITS: 5000 INJECTION INTRAVENOUS; SUBCUTANEOUS at 23:08

## 2023-11-13 RX ADMIN — IOHEXOL 100 ML: 350 INJECTION, SOLUTION INTRAVENOUS at 20:47

## 2023-11-13 RX ADMIN — SODIUM CHLORIDE, SODIUM GLUCONATE, SODIUM ACETATE, POTASSIUM CHLORIDE, MAGNESIUM CHLORIDE, SODIUM PHOSPHATE, DIBASIC, AND POTASSIUM PHOSPHATE 75 ML/HR: .53; .5; .37; .037; .03; .012; .00082 INJECTION, SOLUTION INTRAVENOUS at 23:57

## 2023-11-13 RX ADMIN — MORPHINE SULFATE 2 MG: 2 INJECTION, SOLUTION INTRAMUSCULAR; INTRAVENOUS at 19:26

## 2023-11-13 RX ADMIN — PIPERACILLIN AND TAZOBACTAM 3.38 G: 36; 4.5 INJECTION, POWDER, FOR SOLUTION INTRAVENOUS at 23:09

## 2023-11-13 RX ADMIN — ONDANSETRON 4 MG: 2 INJECTION INTRAMUSCULAR; INTRAVENOUS at 19:26

## 2023-11-13 RX ADMIN — EZETIMIBE 10 MG: 10 TABLET ORAL at 23:08

## 2023-11-13 RX ADMIN — SODIUM CHLORIDE, SODIUM LACTATE, POTASSIUM CHLORIDE, AND CALCIUM CHLORIDE 150 ML/HR: .6; .31; .03; .02 INJECTION, SOLUTION INTRAVENOUS at 21:55

## 2023-11-14 ENCOUNTER — ANESTHESIA (INPATIENT)
Dept: PERIOP | Facility: HOSPITAL | Age: 73
DRG: 418 | End: 2023-11-14
Payer: COMMERCIAL

## 2023-11-14 ENCOUNTER — ANESTHESIA EVENT (INPATIENT)
Dept: PERIOP | Facility: HOSPITAL | Age: 73
DRG: 418 | End: 2023-11-14
Payer: COMMERCIAL

## 2023-11-14 ENCOUNTER — APPOINTMENT (INPATIENT)
Dept: MRI IMAGING | Facility: HOSPITAL | Age: 73
DRG: 418 | End: 2023-11-14
Payer: COMMERCIAL

## 2023-11-14 LAB
ALBUMIN SERPL BCP-MCNC: 3.6 G/DL (ref 3.5–5)
ALP SERPL-CCNC: 142 U/L (ref 34–104)
ALT SERPL W P-5'-P-CCNC: 114 U/L (ref 7–52)
ANION GAP SERPL CALCULATED.3IONS-SCNC: 6 MMOL/L
AST SERPL W P-5'-P-CCNC: 431 U/L (ref 13–39)
BASOPHILS # BLD AUTO: 0.02 THOUSANDS/ÂΜL (ref 0–0.1)
BASOPHILS NFR BLD AUTO: 0 % (ref 0–1)
BILIRUB SERPL-MCNC: 1.43 MG/DL (ref 0.2–1)
BUN SERPL-MCNC: 21 MG/DL (ref 5–25)
CALCIUM SERPL-MCNC: 8.3 MG/DL (ref 8.4–10.2)
CHLORIDE SERPL-SCNC: 107 MMOL/L (ref 96–108)
CO2 SERPL-SCNC: 27 MMOL/L (ref 21–32)
CREAT SERPL-MCNC: 1.01 MG/DL (ref 0.6–1.3)
EOSINOPHIL # BLD AUTO: 0.08 THOUSAND/ÂΜL (ref 0–0.61)
EOSINOPHIL NFR BLD AUTO: 1 % (ref 0–6)
ERYTHROCYTE [DISTWIDTH] IN BLOOD BY AUTOMATED COUNT: 12.2 % (ref 11.6–15.1)
GFR SERPL CREATININE-BSD FRML MDRD: 55 ML/MIN/1.73SQ M
GLUCOSE SERPL-MCNC: 116 MG/DL (ref 65–140)
HCT VFR BLD AUTO: 34.5 % (ref 34.8–46.1)
HGB BLD-MCNC: 10.7 G/DL (ref 11.5–15.4)
IMM GRANULOCYTES # BLD AUTO: 0.02 THOUSAND/UL (ref 0–0.2)
IMM GRANULOCYTES NFR BLD AUTO: 0 % (ref 0–2)
LYMPHOCYTES # BLD AUTO: 1.62 THOUSANDS/ÂΜL (ref 0.6–4.47)
LYMPHOCYTES NFR BLD AUTO: 27 % (ref 14–44)
MAGNESIUM SERPL-MCNC: 2.3 MG/DL (ref 1.9–2.7)
MCH RBC QN AUTO: 30.3 PG (ref 26.8–34.3)
MCHC RBC AUTO-ENTMCNC: 31 G/DL (ref 31.4–37.4)
MCV RBC AUTO: 98 FL (ref 82–98)
MONOCYTES # BLD AUTO: 0.65 THOUSAND/ÂΜL (ref 0.17–1.22)
MONOCYTES NFR BLD AUTO: 11 % (ref 4–12)
NEUTROPHILS # BLD AUTO: 3.62 THOUSANDS/ÂΜL (ref 1.85–7.62)
NEUTS SEG NFR BLD AUTO: 61 % (ref 43–75)
NRBC BLD AUTO-RTO: 0 /100 WBCS
PHOSPHATE SERPL-MCNC: 4.9 MG/DL (ref 2.3–4.1)
PLATELET # BLD AUTO: 203 THOUSANDS/UL (ref 149–390)
PMV BLD AUTO: 10.2 FL (ref 8.9–12.7)
POTASSIUM SERPL-SCNC: 3.7 MMOL/L (ref 3.5–5.3)
PROT SERPL-MCNC: 5.5 G/DL (ref 6.4–8.4)
RBC # BLD AUTO: 3.53 MILLION/UL (ref 3.81–5.12)
SODIUM SERPL-SCNC: 140 MMOL/L (ref 135–147)
WBC # BLD AUTO: 6.01 THOUSAND/UL (ref 4.31–10.16)

## 2023-11-14 PROCEDURE — 0FT44ZZ RESECTION OF GALLBLADDER, PERCUTANEOUS ENDOSCOPIC APPROACH: ICD-10-PCS | Performed by: STUDENT IN AN ORGANIZED HEALTH CARE EDUCATION/TRAINING PROGRAM

## 2023-11-14 PROCEDURE — 84100 ASSAY OF PHOSPHORUS: CPT

## 2023-11-14 PROCEDURE — C9113 INJ PANTOPRAZOLE SODIUM, VIA: HCPCS

## 2023-11-14 PROCEDURE — 85025 COMPLETE CBC W/AUTO DIFF WBC: CPT

## 2023-11-14 PROCEDURE — 99232 SBSQ HOSP IP/OBS MODERATE 35: CPT | Performed by: FAMILY MEDICINE

## 2023-11-14 PROCEDURE — 47562 LAPAROSCOPIC CHOLECYSTECTOMY: CPT

## 2023-11-14 PROCEDURE — 88304 TISSUE EXAM BY PATHOLOGIST: CPT | Performed by: PATHOLOGY

## 2023-11-14 PROCEDURE — 83735 ASSAY OF MAGNESIUM: CPT

## 2023-11-14 PROCEDURE — 0W3P3ZZ CONTROL BLEEDING IN GASTROINTESTINAL TRACT, PERCUTANEOUS APPROACH: ICD-10-PCS | Performed by: STUDENT IN AN ORGANIZED HEALTH CARE EDUCATION/TRAINING PROGRAM

## 2023-11-14 PROCEDURE — 74181 MRI ABDOMEN W/O CONTRAST: CPT

## 2023-11-14 PROCEDURE — 80053 COMPREHEN METABOLIC PANEL: CPT

## 2023-11-14 RX ORDER — ONDANSETRON 2 MG/ML
4 INJECTION INTRAMUSCULAR; INTRAVENOUS ONCE AS NEEDED
Status: DISCONTINUED | OUTPATIENT
Start: 2023-11-14 | End: 2023-11-14

## 2023-11-14 RX ORDER — ONDANSETRON 2 MG/ML
4 INJECTION INTRAMUSCULAR; INTRAVENOUS ONCE AS NEEDED
Status: COMPLETED | OUTPATIENT
Start: 2023-11-14 | End: 2023-11-14

## 2023-11-14 RX ORDER — PROPOFOL 10 MG/ML
INJECTION, EMULSION INTRAVENOUS CONTINUOUS PRN
Status: DISCONTINUED | OUTPATIENT
Start: 2023-11-14 | End: 2023-11-14

## 2023-11-14 RX ORDER — ROCURONIUM BROMIDE 10 MG/ML
INJECTION, SOLUTION INTRAVENOUS AS NEEDED
Status: DISCONTINUED | OUTPATIENT
Start: 2023-11-14 | End: 2023-11-14

## 2023-11-14 RX ORDER — SODIUM CHLORIDE 9 MG/ML
INJECTION, SOLUTION INTRAVENOUS AS NEEDED
Status: DISCONTINUED | OUTPATIENT
Start: 2023-11-14 | End: 2023-11-14 | Stop reason: HOSPADM

## 2023-11-14 RX ORDER — HYDRALAZINE HYDROCHLORIDE 20 MG/ML
INJECTION INTRAMUSCULAR; INTRAVENOUS AS NEEDED
Status: DISCONTINUED | OUTPATIENT
Start: 2023-11-14 | End: 2023-11-14

## 2023-11-14 RX ORDER — HYDROMORPHONE HCL IN WATER/PF 6 MG/30 ML
0.2 PATIENT CONTROLLED ANALGESIA SYRINGE INTRAVENOUS
Status: DISCONTINUED | OUTPATIENT
Start: 2023-11-14 | End: 2023-11-14

## 2023-11-14 RX ORDER — FENTANYL CITRATE 50 UG/ML
INJECTION, SOLUTION INTRAMUSCULAR; INTRAVENOUS AS NEEDED
Status: DISCONTINUED | OUTPATIENT
Start: 2023-11-14 | End: 2023-11-14

## 2023-11-14 RX ORDER — SODIUM CHLORIDE, SODIUM LACTATE, POTASSIUM CHLORIDE, CALCIUM CHLORIDE 600; 310; 30; 20 MG/100ML; MG/100ML; MG/100ML; MG/100ML
75 INJECTION, SOLUTION INTRAVENOUS CONTINUOUS
Status: DISCONTINUED | OUTPATIENT
Start: 2023-11-14 | End: 2023-11-15

## 2023-11-14 RX ORDER — HYDROMORPHONE HCL/PF 1 MG/ML
0.4 SYRINGE (ML) INJECTION
Status: DISCONTINUED | OUTPATIENT
Start: 2023-11-14 | End: 2023-11-14 | Stop reason: HOSPADM

## 2023-11-14 RX ORDER — PROPOFOL 10 MG/ML
INJECTION, EMULSION INTRAVENOUS AS NEEDED
Status: DISCONTINUED | OUTPATIENT
Start: 2023-11-14 | End: 2023-11-14

## 2023-11-14 RX ORDER — DEXAMETHASONE SODIUM PHOSPHATE 10 MG/ML
INJECTION, SOLUTION INTRAMUSCULAR; INTRAVENOUS AS NEEDED
Status: DISCONTINUED | OUTPATIENT
Start: 2023-11-14 | End: 2023-11-14

## 2023-11-14 RX ORDER — FENTANYL CITRATE/PF 50 MCG/ML
25 SYRINGE (ML) INJECTION
Status: DISCONTINUED | OUTPATIENT
Start: 2023-11-14 | End: 2023-11-14 | Stop reason: HOSPADM

## 2023-11-14 RX ORDER — SODIUM CHLORIDE, SODIUM LACTATE, POTASSIUM CHLORIDE, CALCIUM CHLORIDE 600; 310; 30; 20 MG/100ML; MG/100ML; MG/100ML; MG/100ML
INJECTION, SOLUTION INTRAVENOUS CONTINUOUS PRN
Status: DISCONTINUED | OUTPATIENT
Start: 2023-11-14 | End: 2023-11-14

## 2023-11-14 RX ORDER — ONDANSETRON 2 MG/ML
INJECTION INTRAMUSCULAR; INTRAVENOUS AS NEEDED
Status: DISCONTINUED | OUTPATIENT
Start: 2023-11-14 | End: 2023-11-14

## 2023-11-14 RX ORDER — PROMETHAZINE HYDROCHLORIDE 25 MG/ML
12.5 INJECTION, SOLUTION INTRAMUSCULAR; INTRAVENOUS ONCE AS NEEDED
Status: COMPLETED | OUTPATIENT
Start: 2023-11-14 | End: 2023-11-14

## 2023-11-14 RX ORDER — PIMAVANSERIN TARTRATE 34 MG/1
CAPSULE ORAL
COMMUNITY

## 2023-11-14 RX ORDER — FENTANYL CITRATE/PF 50 MCG/ML
25 SYRINGE (ML) INJECTION
Status: DISCONTINUED | OUTPATIENT
Start: 2023-11-14 | End: 2023-11-14

## 2023-11-14 RX ADMIN — HEPARIN SODIUM 5000 UNITS: 5000 INJECTION INTRAVENOUS; SUBCUTANEOUS at 23:00

## 2023-11-14 RX ADMIN — CARBIDOPA AND LEVODOPA 1 TABLET: 25; 100 TABLET ORAL at 20:33

## 2023-11-14 RX ADMIN — FENTANYL CITRATE 50 MCG: 50 INJECTION INTRAMUSCULAR; INTRAVENOUS at 17:40

## 2023-11-14 RX ADMIN — DEXAMETHASONE SODIUM PHOSPHATE 10 MG: 10 INJECTION, SOLUTION INTRAMUSCULAR; INTRAVENOUS at 17:16

## 2023-11-14 RX ADMIN — PROPOFOL 150 MG: 10 INJECTION, EMULSION INTRAVENOUS at 17:12

## 2023-11-14 RX ADMIN — PIPERACILLIN AND TAZOBACTAM 3.38 G: 36; 4.5 INJECTION, POWDER, FOR SOLUTION INTRAVENOUS at 12:20

## 2023-11-14 RX ADMIN — FENTANYL CITRATE 50 MCG: 50 INJECTION INTRAMUSCULAR; INTRAVENOUS at 17:35

## 2023-11-14 RX ADMIN — HEPARIN SODIUM 5000 UNITS: 5000 INJECTION INTRAVENOUS; SUBCUTANEOUS at 12:20

## 2023-11-14 RX ADMIN — METOPROLOL TARTRATE 25 MG: 25 TABLET, FILM COATED ORAL at 08:15

## 2023-11-14 RX ADMIN — PIPERACILLIN AND TAZOBACTAM 3.38 G: 36; 4.5 INJECTION, POWDER, FOR SOLUTION INTRAVENOUS at 05:28

## 2023-11-14 RX ADMIN — METOPROLOL TARTRATE 25 MG: 25 TABLET, FILM COATED ORAL at 20:33

## 2023-11-14 RX ADMIN — SODIUM CHLORIDE, SODIUM LACTATE, POTASSIUM CHLORIDE, AND CALCIUM CHLORIDE 75 ML/HR: .6; .31; .03; .02 INJECTION, SOLUTION INTRAVENOUS at 20:39

## 2023-11-14 RX ADMIN — ONDANSETRON 4 MG: 2 INJECTION INTRAMUSCULAR; INTRAVENOUS at 19:00

## 2023-11-14 RX ADMIN — ACETAMINOPHEN 975 MG: 325 TABLET, FILM COATED ORAL at 05:28

## 2023-11-14 RX ADMIN — PIPERACILLIN AND TAZOBACTAM 3.38 G: 36; 4.5 INJECTION, POWDER, FOR SOLUTION INTRAVENOUS at 17:08

## 2023-11-14 RX ADMIN — ROCURONIUM BROMIDE 50 MG: 10 INJECTION, SOLUTION INTRAVENOUS at 17:12

## 2023-11-14 RX ADMIN — PROPOFOL 100 MCG/KG/MIN: 10 INJECTION, EMULSION INTRAVENOUS at 17:15

## 2023-11-14 RX ADMIN — ONDANSETRON 4 MG: 2 INJECTION INTRAMUSCULAR; INTRAVENOUS at 17:25

## 2023-11-14 RX ADMIN — SUGAMMADEX 200 MG: 100 INJECTION, SOLUTION INTRAVENOUS at 18:28

## 2023-11-14 RX ADMIN — ACETAMINOPHEN 975 MG: 325 TABLET, FILM COATED ORAL at 23:00

## 2023-11-14 RX ADMIN — PIMAVANSERIN TARTRATE 34 MG: 34 CAPSULE ORAL at 23:00

## 2023-11-14 RX ADMIN — HYDRALAZINE HYDROCHLORIDE 10 MG: 20 INJECTION INTRAMUSCULAR; INTRAVENOUS at 18:02

## 2023-11-14 RX ADMIN — LEVOTHYROXINE SODIUM 50 MCG: 50 TABLET ORAL at 05:28

## 2023-11-14 RX ADMIN — Medication 1000 UNITS: at 08:15

## 2023-11-14 RX ADMIN — HEPARIN SODIUM 5000 UNITS: 5000 INJECTION INTRAVENOUS; SUBCUTANEOUS at 05:28

## 2023-11-14 RX ADMIN — MORPHINE SULFATE 2 MG: 2 INJECTION, SOLUTION INTRAMUSCULAR; INTRAVENOUS at 20:31

## 2023-11-14 RX ADMIN — PANTOPRAZOLE SODIUM 40 MG: 40 INJECTION, POWDER, FOR SOLUTION INTRAVENOUS at 08:15

## 2023-11-14 RX ADMIN — FENTANYL CITRATE 50 MCG: 50 INJECTION INTRAMUSCULAR; INTRAVENOUS at 17:12

## 2023-11-14 RX ADMIN — PROMETHAZINE HYDROCHLORIDE 12.5 MG: 25 INJECTION INTRAMUSCULAR; INTRAVENOUS at 19:14

## 2023-11-14 RX ADMIN — SODIUM CHLORIDE, SODIUM LACTATE, POTASSIUM CHLORIDE, AND CALCIUM CHLORIDE: .6; .31; .03; .02 INJECTION, SOLUTION INTRAVENOUS at 17:07

## 2023-11-14 RX ADMIN — CARBIDOPA AND LEVODOPA 1 TABLET: 25; 100 TABLET ORAL at 08:15

## 2023-11-14 NOTE — ASSESSMENT & PLAN NOTE
Blood pressure is significantly elevated at 202/91 upon presentation  Continue home metoprolol tartrate 25 mg oral twice daily  Hydralazine ordered as needed

## 2023-11-14 NOTE — PLAN OF CARE
Problem: PAIN - ADULT  Goal: Verbalizes/displays adequate comfort level or baseline comfort level  Description: Interventions:  - Encourage patient to monitor pain and request assistance  - Assess pain using appropriate pain scale  - Administer analgesics based on type and severity of pain and evaluate response  - Implement non-pharmacological measures as appropriate and evaluate response  - Consider cultural and social influences on pain and pain management  - Notify physician/advanced practitioner if interventions unsuccessful or patient reports new pain  Outcome: Progressing     Problem: INFECTION - ADULT  Goal: Absence or prevention of progression during hospitalization  Description: INTERVENTIONS:  - Assess and monitor for signs and symptoms of infection  - Monitor lab/diagnostic results  - Monitor all insertion sites, i.e. indwelling lines, tubes, and drains  - Monitor endotracheal if appropriate and nasal secretions for changes in amount and color  - [place] appropriate cooling/warming therapies per order  - Administer medications as ordered  - Instruct and encourage patient and family to use good hand hygiene technique  - Identify and instruct in appropriate isolation precautions for identified infection/condition  Outcome: Progressing  Goal: Absence of fever/infection during neutropenic period  Description: INTERVENTIONS:  - Monitor WBC    Outcome: Progressing     Problem: SAFETY ADULT  Goal: Patient will remain free of falls  Description: INTERVENTIONS:  - Educate patient/family on patient safety including physical limitations  - Instruct patient to call for assistance with activity   - Consult OT/PT to assist with strengthening/mobility   - Keep Call bell within reach  - Keep bed low and locked with side rails adjusted as appropriate  - Keep care items and personal belongings within reach  - Initiate and maintain comfort rounds  - Make Fall Risk Sign visible to staff  - Offer Toileting every 2 Hours, in advance of need  - Initiate/Maintain bedalarm  - Obtain necessary fall risk management equipment: alarm  - Apply yellow socks and bracelet for high fall risk patients  - Consider moving patient to room near nurses station  Outcome: Progressing  Goal: Maintain or return to baseline ADL function  Description: INTERVENTIONS:  -  Assess patient's ability to carry out ADLs; assess patient's baseline for ADL function and identify physical deficits which impact ability to perform ADLs (bathing, care of mouth/teeth, toileting, grooming, dressing, etc.)  - Assess/evaluate cause of self-care deficits   - Assess range of motion  - Assess patient's mobility; develop plan if impaired  - Assess patient's need for assistive devices and provide as appropriate  - Encourage maximum independence but intervene and supervise when necessary  - Involve family in performance of ADLs  - Assess for home care needs following discharge   - Consider OT consult to assist with ADL evaluation and planning for discharge  - Provide patient education as appropriate  Outcome: Progressing  Goal: Maintains/Returns to pre admission functional level  Description: INTERVENTIONS:  - Perform BMAT or MOVE assessment daily.   - Set and communicate daily mobility goal to care team and patient/family/caregiver. - Collaborate with rehabilitation services on mobility goals if consulted  - Perform Range of Motion 2 times a day. - Reposition patient every 2 hours.   - Dangle patient 2 times a day  - Stand patient 2 times a day  - Ambulate patient 2 times a day  - Out of bed to chair 2 times a day   - Out of bed for meals 2 times a day  - Out of bed for toileting  - Record patient progress and toleration of activity level   Outcome: Progressing     Problem: DISCHARGE PLANNING  Goal: Discharge to home or other facility with appropriate resources  Description: INTERVENTIONS:  - Identify barriers to discharge w/patient and caregiver  - Arrange for needed discharge resources and transportation as appropriate  - Identify discharge learning needs (meds, wound care, etc.)  - Arrange for interpretive services to assist at discharge as needed  - Refer to Case Management Department for coordinating discharge planning if the patient needs post-hospital services based on physician/advanced practitioner order or complex needs related to functional status, cognitive ability, or social support system  Outcome: Progressing     Problem: Knowledge Deficit  Goal: Patient/family/caregiver demonstrates understanding of disease process, treatment plan, medications, and discharge instructions  Description: Complete learning assessment and assess knowledge base.   Interventions:  - Provide teaching at level of understanding  - Provide teaching via preferred learning methods  Outcome: Progressing

## 2023-11-14 NOTE — CONSULTS
Consultation - General Surgery   Mammrosita Dodge 67 y.o. female MRN: [id number]  Unit/Bed#: -01 Encounter: [id number]    Assessment:  67year old female with PMH for HLD, hypothyroidism, parkinson's disease, with epigastric pain that radiates to her back since yesterday, with findings on RUQ US and Ct scan concerning for acute cholecystitis. Liver enzymes elevated on admission and cont to rise across the board this morning, concerning for possible stone in duct. -CT scan of abdomen pelvis showing distended gallbladder with pericholecystic inflammatory fluid, no biliary dilatation, concerning for acute cholecystitis. -Right upper quadrant abdominal ultrasound showing gallbladder mildly dilated, minimal gallbladder wall thickening and trace pericholecystic fluid. Few echogenic foci scattered along the luminal surface of the gallbladder wall, 2 artifact attributed to adenomyomatosis. Multiple dependent calculi without sludge. No sonographic Duran sign. No intrahepatic biliary dilatation. Common bile duct measures 7.0 mm this tapers normally towards the ampulla. No choledocholithiasis.    -abd exam today is completely benign, but this is in the setting of recent tylenol use  -denies any n/v/abd pain currently   -afebrile, VSS  -Patient's leukocytosis has improved from 11.8-6 this morning.    -Unfortunately though patient's liver functions have continued to worsen:    AST now 431, was 146 yesterday. , was 40 yesterday. Alkaline phosphatase 142 was 138 yesterday. Total bilirubin 1.4 today, was 1.28 yesterday.    -LA WNL  -blood cultures pending    Plan:  -cont NPO  -cont fluids while NPO  -antiemetics and analgesics per primary team  -will obtain MRI MRCP to r/o choledocholithiasis given rising LFTS  -discussed DNR status and she is agreeable to suspend this for operative procedure if necessary  -we discussed possibility of laparoscopic assisted cholecystectomy if MRCP is negative, which she is agreeable to if needed  -we also discussed if MRCP is positive for choledocholithiasis, she will require transfer for ERCP prior to lap luann and she would prefer [facility] if this is needed    History of Present Illness   Chief Complaint: epigastric pain that radiates to her back    HPI:  Whitney Basilio is a 67 y.o. female with past medical history significant for hyperlipidemia, hypothyroidism, Parkinson's disease who initially presented to [address] emergency room yesterday 11/13/2023 with complaints of epigastric pain that radiated to mid back starting yesterday afternoon that worsened throughout the day. She went to [facility] and her friends told her she did not look good and needed to come and be evaluated. She has never had pain like that before. Upon presentation to the emergency room patient was found to have abdominal pain, leukocytosis, uncontrolled hypertension, with CT scan of abdomen pelvis showing distended gallbladder with pericholecystic inflammatory fluid, no biliary dilatation, concerning for acute cholecystitis. Right upper quadrant abdominal ultrasound showing gallbladder mildly dilated, minimal gallbladder wall thickening and trace pericholecystic fluid. Few echogenic foci scattered along the luminal surface of the gallbladder wall, 2 artifact attributed to adenomyomatosis. Multiple dependent calculi without sludge. No sonographic Duran sign. No intrahepatic biliary dilatation. Common bile duct measures 7.0 mm this tapers normally towards the ampulla. No choledocholithiasis. Patient's leukocytosis has improved from 11.8-6 this morning. Unfortunately though patient's liver functions have continued to worsen, AST now 431, was 146 yesterday. , was 40 yesterday. Alkaline phosphatase 142 was 138 yesterday. Total bilirubin 1.4 today, was 1.28 yesterday. She denies any pain related to eating.  Says pain started in her epigastric region yesterday around 11 am. She then ate pierogies with butter with improvement in the pain after eating, but pain returned shortly thereafter. Pain starts in epigastric region and radiates to back. Denies any fever, chills, nausea, vomiting, change in bowels. She has been dealing with constipation for the past year after her iron pills were stopped. No chest pain or shortness of breath. She has noted mid back pain for months but only with movement when lying in bed, therefore she has been sleeping in her power lift recliner. Utilizes a cane to ambulate at baseline and lives with her . Previous abd surgery includes tubal ligation and hysterectomy. No issues with anesthesia previously, and we discussed she is DNR at this time but she would be agreeable to full code for procedure. She states she wishes to not be resuscitated in the event she were terminal.     Review of Systems   Constitutional:  Negative for appetite change, chills and fever. Respiratory:  Negative for chest tightness and shortness of breath. Cardiovascular:  Negative for chest pain and leg swelling. Gastrointestinal:  Positive for abdominal pain and constipation. Negative for abdominal distention, anal bleeding, blood in stool, diarrhea, nausea, rectal pain and vomiting. All other systems reviewed and are negative. Historical Information   Past Medical History:   Diagnosis Date    HLD (hyperlipidemia)     Hypothyroid     Parkinson disease      Past Surgical History:   Procedure Laterality Date    ORIF TIBIA & FIBULA FRACTURES Left 10/31/2022    Procedure: OPEN REDUCTION W/ INTERNAL FIXATION (ORIF) ANKLE;  Surgeon: Alyssia Carson;   Location:  MAIN OR;  Service: Orthopedics     Social History   Social History     Substance and Sexual Activity   Alcohol Use Not Currently     Social History     Substance and Sexual Activity   Drug Use Never     E-Cigarette/Vaping    E-Cigarette Use Never User      E-Cigarette/Vaping Substances     Social History Tobacco Use   Smoking Status Never   Smokeless Tobacco Never     Family History: non-contributory    Meds/Allergies   all current active meds have been reviewed  Allergies   Allergen Reactions    Lidocaine Anaphylaxis    Mushroom Extract Complex - Food Allergy Anaphylaxis and Throat Swelling    Butamben-Tetracaine-Benzocaine Edema    Codeine GI Intolerance     Upset stomach      Sertraline Itching     Reaction unknown      Statins Myalgia       Objective   First Vitals:   Blood Pressure: (!) 202/91 (11/13/23 1854)  Pulse: 66 (11/13/23 1854)  Temperature: 97.7 °F (36.5 °C) (11/13/23 1854)  Temp Source: Temporal (11/13/23 1854)  Respirations: 18 (11/13/23 1854)  Height: 5' 2" (157.5 cm) (11/13/23 2354)  Weight - Scale: 93.3 kg (205 lb 11 oz) (11/13/23 1854)  SpO2: 97 % (11/13/23 1854)    Current Vitals:   Blood Pressure: 114/58 (11/14/23 0629)  Pulse: 62 (11/14/23 0629)  Temperature: 97.5 °F (36.4 °C) (11/14/23 0629)  Temp Source: Temporal (11/13/23 2354)  Respirations: 18 (11/14/23 0629)  Height: 5' 2" (157.5 cm) (11/13/23 2354)  Weight - Scale: 93.3 kg (205 lb 11 oz) (11/13/23 2354)  SpO2: 97 % (11/14/23 0629)      Intake/Output Summary (Last 24 hours) at 11/14/2023 0758  Last data filed at 11/13/2023 2157  Gross per 24 hour   Intake 5 ml   Output --   Net 5 ml       Invasive Devices       Peripheral Intravenous Line  Duration             Peripheral IV 11/13/23 Left Antecubital <1 day              Drain  Duration             External Urinary Catheter 378 days                    Physical Exam  Vitals and nursing note reviewed. Constitutional:       General: She is not in acute distress. Appearance: Normal appearance. She is obese. She is not ill-appearing, toxic-appearing or diaphoretic. HENT:      Head: Normocephalic and atraumatic. Eyes:      General: No scleral icterus. Cardiovascular:      Rate and Rhythm: Normal rate. Heart sounds: Normal heart sounds.    Pulmonary:      Effort: Pulmonary effort is normal. No respiratory distress. Breath sounds: Normal breath sounds. Abdominal:      General: Abdomen is flat. There is no distension. Palpations: Abdomen is soft. There is no mass. Tenderness: There is no abdominal tenderness. There is no guarding or rebound. Hernia: No hernia is present. Comments: Old vertical midline incision noted   Musculoskeletal:         General: No swelling. Skin:     General: Skin is warm and dry. Capillary Refill: Capillary refill takes less than 2 seconds. Coloration: Skin is not jaundiced or pale. Neurological:      General: No focal deficit present. Mental Status: She is alert and oriented to person, place, and time. Psychiatric:         Mood and Affect: Mood normal.         Lab Results: I have personally reviewed pertinent lab results. , CBC:   Lab Results   Component Value Date    WBC 6.01 11/14/2023    HGB 10.7 (L) 11/14/2023    HCT 34.5 (L) 11/14/2023    MCV 98 11/14/2023     11/14/2023    RBC 3.53 (L) 11/14/2023    MCH 30.3 11/14/2023    MCHC 31.0 (L) 11/14/2023    RDW 12.2 11/14/2023    MPV 10.2 11/14/2023    NRBC 0 11/14/2023   , CMP:   Lab Results   Component Value Date    SODIUM 140 11/14/2023    K 3.7 11/14/2023     11/14/2023    CO2 27 11/14/2023    BUN 21 11/14/2023    CREATININE 1.01 11/14/2023    CALCIUM 8.3 (L) 11/14/2023     (H) 11/14/2023     (H) 11/14/2023    ALKPHOS 142 (H) 11/14/2023    EGFR 55 11/14/2023   Blood cultures X2 11/13/2023 pending  LA 1.0  Lipase 24  Imaging: I have personally reviewed pertinent reports. EKG, Pathology, and Other Studies: I have personally reviewed pertinent reports. Code Status: Level 3 - DNAR and DNI  Advance Directive and Living Will:      Power of :    POLST:      Counseling / Coordination of Care  Total floor / unit time spent today 30 minutes.   Greater than 50% of total time was spent with the patient and / or family counseling and / or coordination of care. A description of the counseling / coordination of care: 15.

## 2023-11-14 NOTE — ASSESSMENT & PLAN NOTE
Patient presented with abdominal pain.   Leukocytosis upon presentation  White blood cells slightly elevated at 11.89  Check lactic  CT abdomen pelvis showing evidence of acute cholecystitis  Right upper quadrant ultrasound ordered  Case discussed ED provider with general surgery who recommended admission  N.p.o. at midnight  IV fluids Isolyte  Zosyn 3.375 mg IV every 6 hours  Hold home aspirin  Pain control as needed

## 2023-11-14 NOTE — PROGRESS NOTES
[address] [facility]  Progress Note  Name: Flor Hernadez I  MRN: [id number]  Unit/Bed#: -01 I Date of Admission: 11/13/2023   Date of Service: 11/14/2023 I Hospital Day: 1    Assessment/Plan   * Acute cholecystitis  Assessment & Plan  Patient presented with abdominal pain. Leukocytosis upon presentation  White blood cells slightly elevated at 11.89 now resolved  Lactic acid is normal  CT abdomen pelvis showing evidence of acute cholecystitis  Right upper quadrant ultrasound ordered positive for cholecystitis  Discussed with surgery today as elevation of LFTs and T. bili MRCP ordered to rule out choledocholithiasis if positive she will need a transfer. IV fluids Isolyte  Zosyn 3.375 g IV every 6 hours  Hold home aspirin  Pain control as needed    Hypertensive urgency  Assessment & Plan  Blood pressure is significantly elevated at 202/91 upon presentation  Continue home metoprolol tartrate 25 mg oral twice daily  Hydralazine ordered as needed  Blood pressure is normal now    Parkinson's disease  Assessment & Plan  Continue Sinemet, she is also on nuplazid wich will discuss to bring in from home    Gastro-esophageal reflux disease without esophagitis  Assessment & Plan  Protonix 40 mg IV daily    Acquired hypothyroidism  Assessment & Plan  Continue home levothyroxine           VTE Pharmacologic Prophylaxis: VTE Score: 7 High Risk (Score >/= 5) - Pharmacological DVT Prophylaxis Ordered: heparin. Sequential Compression Devices Ordered. Mobility:      HLM Goal NOT achieved. Continue with multidisciplinary rounding and encourage appropriate mobility to improve upon [facility] goals. Patient Centered Rounds: I performed bedside rounds with nursing staff today. Discussions with Specialists or Other Care Team Provider: francy    Education and Discussions with Family / Patient: pt will update family     Total Time Spent on Date of Encounter in care of patient: >35 mins.  This time was spent on one or more of the following: performing physical exam; counseling and coordination of care; obtaining or reviewing history; documenting in the medical record; reviewing/ordering tests, medications or procedures; communicating with other healthcare professionals and discussing with patient's family/caregivers. Current Length of Stay: 1 day(s)  Current Patient Status: Inpatient   Certification Statement: The patient will continue to require additional inpatient hospital stay due to acute cholecystitis   Discharge Plan: Anticipate discharge in 24-48 hrs to home. Code Status: Level 3 - DNAR and DNI    Subjective:   Seen and examined no abdominal pain or n/v    Objective:     Vitals:   Temp (24hrs), Av.6 °F (36.4 °C), Min:97.5 °F (36.4 °C), Max:97.7 °F (36.5 °C)    Temp:  [97.5 °F (36.4 °C)-97.7 °F (36.5 °C)] 97.5 °F (36.4 °C)  HR:  [57-73] 57  Resp:  [16-18] 18  BP: (114-202)/(58-91) 141/79  SpO2:  [96 %-98 %] 98 %  Body mass index is 37.62 kg/m². Input and Output Summary (last 24 hours): Intake/Output Summary (Last 24 hours) at 2023 1145  Last data filed at 2023 2157  Gross per 24 hour   Intake 5 ml   Output --   Net 5 ml       Physical Exam:   Physical Exam  Vitals and nursing note reviewed. Constitutional:       General: She is not in acute distress. Appearance: She is well-developed. HENT:      Head: Normocephalic and atraumatic. Eyes:      Conjunctiva/sclera: Conjunctivae normal.   Cardiovascular:      Rate and Rhythm: Normal rate and regular rhythm. Heart sounds: No murmur heard. Pulmonary:      Effort: Pulmonary effort is normal. No respiratory distress. Breath sounds: Normal breath sounds. No wheezing or rales. Abdominal:      General: There is no distension. Palpations: Abdomen is soft. Tenderness: There is no abdominal tenderness. Musculoskeletal:         General: Swelling (trace b/l) present. Cervical back: Neck supple.    Skin:     General: Skin is warm and dry. Capillary Refill: Capillary refill takes less than 2 seconds. Neurological:      Mental Status: She is alert and oriented to person, place, and time. Psychiatric:         Mood and Affect: Mood normal.          Additional Data:     Labs:  Results from last 7 days   Lab Units 11/14/23  0538   WBC Thousand/uL 6.01   HEMOGLOBIN g/dL 10.7*   HEMATOCRIT % 34.5*   PLATELETS Thousands/uL 203   NEUTROS PCT % 61   LYMPHS PCT % 27   MONOS PCT % 11   EOS PCT % 1     Results from last 7 days   Lab Units 11/14/23  0538   SODIUM mmol/L 140   POTASSIUM mmol/L 3.7   CHLORIDE mmol/L 107   CO2 mmol/L 27   BUN mg/dL 21   CREATININE mg/dL 1.01   ANION GAP mmol/L 6   CALCIUM mg/dL 8.3*   ALBUMIN g/dL 3.6   TOTAL BILIRUBIN mg/dL 1.43*   ALK PHOS U/L 142*   ALT U/L 114*   AST U/L 431*   GLUCOSE RANDOM mg/dL 116                 Results from last 7 days   Lab Units 11/13/23  2150   LACTIC ACID mmol/L 1.0       Lines/Drains:  Invasive Devices       Peripheral Intravenous Line  Duration             Peripheral IV 11/13/23 Left Antecubital <1 day              Drain  Duration             External Urinary Catheter 378 days                          Imaging: Reviewed radiology reports from this admission including: ultrasound(s)    Recent Cultures (last 7 days):   Results from last 7 days   Lab Units 11/13/23  2150   BLOOD CULTURE  Received in Microbiology Lab. Culture in Progress. Received in Microbiology Lab. Culture in Progress.        Last 24 Hours Medication List:   Current Facility-Administered Medications   Medication Dose Route Frequency Provider Last Rate    acetaminophen  975 mg Oral Q8H [facility] Gary Ochoa PA-C      carbidopa-levodopa  1 tablet Oral TID Noble Garcia PA-C      cholecalciferol  1,000 Units Oral Daily Noble Garcia PA-C      glycerin-hypromellose-  1 drop Both Eyes Q4H PRN Noble Garcia PA-C      heparin (porcine)  5,000 Units Subcutaneous [facility] [address] NONA Ochoa      hydrALAZINE  5 mg Intravenous Q6H PRN Benjy Plum, PA-C      levothyroxine  50 mcg Oral Early Morning Benjy Plum, PA-C      metoprolol tartrate  25 mg Oral Q12H [address] [facility], [facility]-CHEMA      morphine injection  2 mg Intravenous Q4H PRN Benjy Plum, PA-C      multi-electrolyte  75 mL/hr Intravenous Continuous Benjy Plum, PA-C 75 mL/hr (11/13/23 1153)    ondansetron  4 mg Intravenous Q6H PRN Benjy Plum, PA-C      pantoprazole  40 mg Intravenous Q24H [address] [place] Don, PA-CHEMA      Pimavanserin Tartrate  34 mg Oral HS Hermon Brunner, MD      piperacillin-tazobactam  3.375 g Intravenous Q6H Benjy Plum, PA-C 3.375 g (11/14/23 5449)        Today, Patient Was Seen By: Hermon Brunner, MD    **Please Note: This note may have been constructed using a voice recognition system. **

## 2023-11-14 NOTE — ASSESSMENT & PLAN NOTE
Blood pressure is significantly elevated at 202/91 upon presentation  Continue home metoprolol tartrate 25 mg oral twice daily  Hydralazine ordered as needed  Blood pressure is normal now

## 2023-11-14 NOTE — ASSESSMENT & PLAN NOTE
Patient presented with abdominal pain. Leukocytosis upon presentation  White blood cells slightly elevated at 11.89 now resolved  Lactic acid is normal  CT abdomen pelvis showing evidence of acute cholecystitis  Right upper quadrant ultrasound ordered positive for cholecystitis  Discussed with surgery today as elevation of LFTs and T. bili MRCP ordered to rule out choledocholithiasis if positive she will need a transfer.   IV fluids Isolyte  Zosyn 3.375 g IV every 6 hours  Hold home aspirin  Pain control as needed

## 2023-11-14 NOTE — UTILIZATION REVIEW
NOTIFICATION OF INPATIENT ADMISSION   AUTHORIZATION REQUEST   SERVICING FACILITY:   [address]  Tax ID: [id number]  NPI: [id number] ATTENDING PROVIDER:  Attending Name and NPI#: Kiran Marina Md [[id number]]  Address: [address] [place] [address]  Phone: [phone]   ADMISSION INFORMATION:  Place of Service: Inpatient [address]  Place of Service Code: 21  Inpatient Admission Date/Time: 11/13/23  9:45 PM  Discharge Date/Time: No discharge date for patient encounter. Admitting Diagnosis Code/Description:  Acute cholecystitis [K81.0]  Abdominal pain [R10.9]     UTILIZATION REVIEW CONTACT:  Darian Fields Utilization   Network Utilization Review Department  Phone: [phone]  Fax [phone]  Email: [email or web address]  Contact for approvals/pending authorizations, clinical reviews, and discharge. PHYSICIAN ADVISORY SERVICES:  Medical Necessity Denial & Qldz-tr-Matj Review  Phone: [phone]  Fax: [phone]  Email: [email or web address]     DISCHARGE SUPPORT TEAM:  For Patients Discharge Needs & Updates  Phone: [phone] opt. 2 Fax: [phone]  Email: [email or web address]

## 2023-11-14 NOTE — ANESTHESIA PREPROCEDURE EVALUATION
Procedure:  CHOLECYSTECTOMY LAPAROSCOPIC (Abdomen)    Relevant Problems   CARDIO   (+) Hypertensive urgency      ENDO   (+) Acquired hypothyroidism      GI/HEPATIC   (+) Gastro-esophageal reflux disease without esophagitis      PULMONARY   (+) Chronic obstructive pulmonary disease (HCC)      Digestive   (+) Acute cholecystitis      Nervous and Auditory   (+) Parkinson's disease             Anesthesia Plan  ASA Score- 3     Anesthesia Type- general with ASA Monitors. Additional Monitors:     Airway Plan: ETT. Plan Factors-    Chart reviewed. Induction- intravenous. Postoperative Plan-     Informed Consent- Anesthetic plan and risks discussed with patient. I personally reviewed this patient with the CRNA. Discussed and agreed on the Anesthesia Plan with the CRNA. Abraham Henning

## 2023-11-14 NOTE — UTILIZATION REVIEW
Initial Clinical Review    Admission: Date/Time/Statement:   Admission Orders (From admission, onward)       Ordered        11/13/23 2145  INPATIENT ADMISSION  Once                          Orders Placed This Encounter   Procedures    INPATIENT ADMISSION     Standing Status:   Standing     Number of Occurrences:   1     Order Specific Question:   Level of Care     Answer:   Med Surg [16]     Order Specific Question:   Estimated length of stay     Answer:   More than 2 Midnights     Order Specific Question:   Certification     Answer:   I certify that inpatient services are medically necessary for this patient for a duration of greater than two midnights. See H&P and MD Progress Notes for additional information about the patient's course of treatment. ED Arrival Information       Expected   -    Arrival   11/13/2023 18:48    Acuity   Urgent              Means of arrival   Wheelchair    Escorted by   Spouse    Service   Hospitalist    Admission type   Emergency              Arrival complaint   abdominal pain  back pain             Chief Complaint   Patient presents with    Abdominal Pain     Pt reports abd pain and bloating that started today. Denies n/v/d. Last BM yesterday. Initial Presentation: 67 y.o. female with PMHx of HLD, hypothyroidism, Parkinson's presents to ED as with c/o abdominal pain that started today. BP elevated on presentation. WBCs 11.89. CT a/p showed acute cholecystitis. Admitted inpatient to MS unit with Acute Cholecystitis -- check lactic. URS US ordered. Npo after mdn. IVFs. Zosyn q 6. Hold home aspirin. Analgesics prn. Protonix IV daily. Consult surgery. Continue home levothyroxine. Date: 11/14  Day 2:   Surgery consult -- abd exam today is completely benign, but this is in the setting of recent tylenol use. Denies n/v/abd pain currently. WBCs improved to 6 this AM. LFTs have continued to worsen:    AST now 431, was 146 yesterday. , was 40 yesterday.   Alk phos 142 was 138 yesterday. T bili 1.4 today, was 1.28 yesterday. LA WNL. Blood cxs pending. Plan: cont NPO, IVFs. Antiemetics and analgesics prn. Obtain MRI MRCP to r/o choledocholithiasis given rising LFTS. MRCP ordered. OPERATIVE REPORT  SURGERY DATE: 11/14/2023  Procedure(s):  CHOLECYSTECTOMY LAPAROSCOPIC   Anesthesia Type:   General   Operative Indications:  Acute cholecystitis [K81.0]   Operative Findings:  Acute calculus cholecystitis with edema of the gallbladder wall. A pulsating vessel was present within the hepatic fossa. A moderate amount of bleeding was encountered. This was able to be controlled with cautery and Surgicel. Date: 11/15    Day 3: Has surpassed a 2nd midnight with active treatments and services, which include continued tx of acute cholecystitis and post-op care.         ED Triage Vitals   Temperature Pulse Respirations Blood Pressure SpO2   11/13/23 1854 11/13/23 1854 11/13/23 1854 11/13/23 1854 11/13/23 1854   97.7 °F (36.5 °C) 66 18 (!) 202/91 97 %      Temp Source Heart Rate Source Patient Position - Orthostatic VS BP Location FiO2 (%)   11/13/23 1854 11/13/23 1854 11/13/23 1854 11/13/23 1854 --   Temporal Monitor Lying Right arm       Pain Score       11/13/23 1926       10 - Worst Possible Pain          Wt Readings from Last 1 Encounters:   11/13/23 93.3 kg (205 lb 11 oz)     Additional Vital Signs:   Date/Time Temp Pulse Resp BP MAP (mmHg) SpO2 O2 Device Patient Position - Orthostatic VS   11/14/23 08:06:28 -- 57 -- 141/79 100 98 % -- --   11/14/23 0800 -- -- -- -- -- 97 % None (Room air) --   11/14/23 06:29:38 97.5 °F (36.4 °C) 62 18 114/58 77 97 % -- --   11/13/23 2354 97.6 °F (36.4 °C) 73 16 170/72 -- -- -- --   11/13/23 22:17:55 97.6 °F (36.4 °C) 73 16 170/72 105 96 % None (Room air) --   11/13/23 1854 97.7 °F (36.5 °C) 66 18 202/91 Abnormal  -- 97 % None (Room air) Lying     Pertinent Labs/Diagnostic Test Results:   US right upper quadrant   Final Result by Diandra Epperson Ami Bonilla MD (11/14 0551)      Cholelithiasis with additional findings compatible with acute cholecystitis in the appropriate clinical context. Sonographer reports a negative sonographic Duran sign. Correlate with clinical findings. Follow-up with a HIDA scan if it would alter    patient management. Hepatomegaly and mild hepatic steatosis. This study demonstrates a significant  finding and was documented as such in [facility] for liaison and referring practitioner notification. CT abdomen pelvis with contrast   Final Result by Librado Goldsmith MD (11/13 2121)      Distended gallbladder with pericholecystic inflammatory fluid concerning for acute cholecystitis. Correlate with gallbladder ultrasound. MRI abdomen wo contrast and mrcp  11/14:  1. Normal MRCP. 2.  Acute calculus cholecystitis. 3.  Very mild hepatic steatosis.             Results from last 7 days   Lab Units 11/14/23 0538 11/13/23 1922   WBC Thousand/uL 6.01 11.89*   HEMOGLOBIN g/dL 10.7* 12.1   HEMATOCRIT % 34.5* 38.9   PLATELETS Thousands/uL 203 245   NEUTROS ABS Thousands/µL 3.62 9.43*         Results from last 7 days   Lab Units 11/14/23 0538 11/13/23 1922   SODIUM mmol/L 140 138   POTASSIUM mmol/L 3.7 4.1   CHLORIDE mmol/L 107 104   CO2 mmol/L 27 27   ANION GAP mmol/L 6 7   BUN mg/dL 21 22   CREATININE mg/dL 1.01 0.85   EGFR ml/min/1.73sq m 55 68   CALCIUM mg/dL 8.3* 9.0   MAGNESIUM mg/dL 2.3  --    PHOSPHORUS mg/dL 4.9*  --      Results from last 7 days   Lab Units 11/14/23 0538 11/13/23 1922   AST U/L 431* 146*   ALT U/L 114* 40   ALK PHOS U/L 142* 138*   TOTAL PROTEIN g/dL 5.5* 6.3*   ALBUMIN g/dL 3.6 4.4   TOTAL BILIRUBIN mg/dL 1.43* 1.28*         Results from last 7 days   Lab Units 11/14/23 0538 11/13/23 1922   GLUCOSE RANDOM mg/dL 116 164*     Results from last 7 days   Lab Units 11/13/23 2150   LACTIC ACID mmol/L 1.0     Results from last 7 days   Lab Units 11/13/23 1922   LIPASE u/L 24 Results from last 7 days   Lab Units 11/13/23 2150   BLOOD CULTURE  Received in Microbiology Lab. Culture in Progress. Received in Microbiology Lab. Culture in Progress. ED Treatment:   Medication Administration from 11/13/2023 1843 to 11/13/2023 2207         Date/Time Order Dose Route Action     11/13/2023 1926 EST ondansetron (ZOFRAN) injection 4 mg 4 mg Intravenous Given     11/13/2023 1926 EST morphine injection 2 mg 2 mg Intravenous Given     11/13/2023 2155 EST lactated ringers infusion 150 mL/hr Intravenous New Bag       Past Medical History:   Diagnosis Date    HLD (hyperlipidemia)     Hypothyroid     Parkinson disease      Present on Admission:   Acquired hypothyroidism   Gastro-esophageal reflux disease without esophagitis   Parkinson's disease   Hypertensive urgency      Admitting Diagnosis: Acute cholecystitis [K81.0]  Abdominal pain [R10.9]  Age/Sex: 67 y.o. female  Admission Orders:  Scheduled Medications:  acetaminophen, 975 mg, Oral, Q8H [facility]  carbidopa-levodopa, 1 tablet, Oral, TID  cholecalciferol, 1,000 Units, Oral, Daily  heparin (porcine), 5,000 Units, Subcutaneous, Q8H [facility]  levothyroxine, 50 mcg, Oral, Early Morning  metoprolol tartrate, 25 mg, Oral, Q12H JAMES  pantoprazole, 40 mg, Intravenous, Q24H JAMES  Pimavanserin Tartrate, 34 mg, Oral, HS  piperacillin-tazobactam, 3.375 g, Intravenous, Q6H    Continuous IV Infusions:  multi-electrolyte, 75 mL/hr, Intravenous, Continuous    PRN Meds:  glycerin-hypromellose-, 1 drop, Both Eyes, Q4H PRN  hydrALAZINE, 5 mg, Intravenous, Q6H PRN  morphine injection, 2 mg, Intravenous, Q4H PRN  trimethobenzamide, 200 mg, Intramuscular, Q6H PRN            Network Utilization Review Department  ATTENTION: Please call with any questions or concerns to [phone] and carefully listen to the prompts so that you are directed to the right person.  All voicemails are confidential.   For Discharge needs, contact Care Management DC Support Team at [phone] opt. 2  Send all requests for admission clinical reviews, approved or denied determinations and any other requests to dedicated fax number below belonging to the campus where the patient is receiving treatment.  List of dedicated fax numbers for the Facilities:  [place] DENIALS (Administrative/Medical Necessity) [phone]   DISCHARGE SUPPORT TEAM (NETWORK) [id number] (Maternity/NICU/Pediatrics) [phone]   [address] [place] [address] Drive 15[address] [facility] 1000 [facility] [phone] [address] [place] [address] [address] [address] [facility] [address] [facility] [address] [phone]

## 2023-11-14 NOTE — ED PROVIDER NOTES
History  Chief Complaint   Patient presents with    Abdominal Pain     Pt reports abd pain and bloating that started today. Denies n/v/d. Last BM yesterday. 80-year-old female presenting to the emergency room with chief complaint of abdominal pain and bloating which started today. Radiates to her back. Reports constipation. Last bowel movement was yesterday. Patient denies nausea or vomiting. No fevers. No urinary complaints. Past medical history is significant for tubal ligation and a total abdominal hysterectomy. Patient is also had mesh suspension of her bladder. No history of appendectomy or cholecystectomy. Patient reports that she feels as if she is "pregnant."      History provided by:  Patient and spouse  Abdominal Pain  Pain location:  Generalized  Pain quality: aching, bloating and pressure    Pain radiates to:  Back  Pain severity:  Severe  Onset quality:  Gradual  Timing:  Constant  Progression:  Worsening  Context: not alcohol use    Relieved by:  None tried  Associated symptoms: constipation    Associated symptoms: no anorexia, no chest pain and no chills        Prior to Admission Medications   Prescriptions Last Dose Informant Patient Reported? Taking?    Pimavanserin Tartrate (Nuplazid) 34 MG CAPS 11/13/2023  Yes Yes   Sig: daily at bedtime   Sodium Phosphates (ENEMA RE)   Yes No   Sig: Insert into the rectum   Thiamine HCl (VITAMIN B-1 PO)   Yes No   Sig: Take 1 tablet by mouth 3 (three) times a day   acetaminophen (TYLENOL) 325 mg tablet   No No   Sig: Take 3 tablets (975 mg total) by mouth every 8 (eight) hours   aluminum-magnesium hydroxide-simethicone (MAALOX) 200-200-20 MG/5ML SUSP   Yes No   Sig: Take 30 mL by mouth 4 (four) times a day (before meals and at bedtime)   aspirin 81 mg chewable tablet 11/13/2023  Yes Yes   Sig: Chew 81 mg daily   bisacodyl (DULCOLAX) 10 mg suppository   Yes No   Sig: Insert 10 mg into the rectum daily   carbidopa-levodopa (SINEMET)  mg per tablet 11/13/2023  Yes Yes   Sig: Take 1 tablet by mouth Three times a day   cholecalciferol (VITAMIN D3) 1,000 units tablet 11/13/2023  Yes Yes   Sig: Take 1,000 Units by mouth daily TAKES 2,000 UNITS   enoxaparin (LOVENOX) 40 mg/0.4 mL   No No   Sig: Inject 0.4 mL (40 mg total) under the skin daily While at rehab- when discharge change to aspirin 325mg BID through 11/21 Do not start before November 5, 2022.    ezetimibe (ZETIA) 10 mg tablet 11/13/2023  Yes Yes   Sig: Take by mouth   glycerin-hypromellose- (ARTIFICIAL TEARS) 0.2-0.2-1 % SOLN   No No   Sig: Administer 1 drop to both eyes every 4 (four) hours as needed (dry eye)   levothyroxine 50 mcg tablet 11/13/2023  Yes Yes   Sig: TAKE 1 TABLET BY MOUTH DAILY AT LEAST 30 MINUTES PRIOR TO FIRST MEAL OF THE DAY OR OTHER MEDICATIONS   magnesium hydroxide (MILK OF MAGNESIA) 400 mg/5 mL oral suspension   Yes No   Sig: Take by mouth daily as needed for constipation   metoprolol tartrate (LOPRESSOR) 25 mg tablet 11/13/2023  Yes Yes   Sig: Take by mouth   omeprazole (PriLOSEC OTC) 20 MG tablet 11/13/2023  Yes Yes   Sig: Take by mouth   oxyCODONE (ROXICODONE) 5 immediate release tablet   No No   Sig: Take 1 tablet (5 mg total) by mouth every 6 (six) hours as needed for moderate pain for up to 6 doses Max Daily Amount: 20 mg   oxybutynin (DITROPAN) 5 mg tablet   No No   Sig: Take 1 tablet (5 mg total) by mouth 2 (two) times a day   polyethylene glycol (MIRALAX) 17 g packet   No No   Sig: Take 17 g by mouth daily   senna-docusate sodium (SENOKOT S) 8.6-50 mg per tablet   No No   Sig: Take 2 tablets by mouth 2 (two) times a day as needed for constipation   simvastatin (ZOCOR) 10 mg tablet   Yes No   Sig: Take by mouth      Facility-Administered Medications: None       Past Medical History:   Diagnosis Date    HLD (hyperlipidemia)     Hypothyroid     Parkinson disease        Past Surgical History:   Procedure Laterality Date    ORIF TIBIA & FIBULA FRACTURES Left 10/31/2022    Procedure: OPEN REDUCTION W/ INTERNAL FIXATION (ORIF) ANKLE;  Surgeon: Edil Santiago; Location:  MAIN OR;  Service: Orthopedics       History reviewed. No pertinent family history. I have reviewed and agree with the history as documented. E-Cigarette/Vaping    E-Cigarette Use Never User      E-Cigarette/Vaping Substances     Social History     Tobacco Use    Smoking status: Never    Smokeless tobacco: Never   Vaping Use    Vaping Use: Never used   Substance Use Topics    Alcohol use: Not Currently    Drug use: Never       Review of Systems   Constitutional: Negative. Negative for chills. Respiratory: Negative. Cardiovascular: Negative. Negative for chest pain. Gastrointestinal:  Positive for abdominal pain and constipation. Negative for anorexia. Genitourinary: Negative. All other systems reviewed and are negative. Physical Exam  Physical Exam  Vitals and nursing note reviewed. Constitutional:       General: She is awake. She is in acute distress. Appearance: Normal appearance. She is well-developed. She is obese. She is ill-appearing. She is not toxic-appearing. HENT:      Head: Normocephalic and atraumatic. Right Ear: External ear normal.      Left Ear: External ear normal.      Nose: Nose normal.      Mouth/Throat:      Mouth: Mucous membranes are moist.   Eyes:      General: Lids are normal. No scleral icterus. Extraocular Movements: Extraocular movements intact. Pupils: Pupils are equal, round, and reactive to light. Cardiovascular:      Rate and Rhythm: Normal rate and regular rhythm. Heart sounds: Normal heart sounds. No murmur heard. Pulmonary:      Effort: Pulmonary effort is normal. No respiratory distress. Breath sounds: Normal breath sounds. No wheezing, rhonchi or rales. Abdominal:      General: Abdomen is flat. Bowel sounds are absent. There is distension. Palpations: Abdomen is soft. Tenderness:  There is generalized abdominal tenderness. There is no guarding or rebound. Musculoskeletal:         General: No swelling, tenderness or deformity. Normal range of motion. Cervical back: Normal range of motion and neck supple. Skin:     General: Skin is warm and dry. Coloration: Skin is not jaundiced or pale. Findings: No rash. Neurological:      Mental Status: She is alert and oriented to person, place, and time. Mental status is at baseline. Cranial Nerves: No cranial nerve deficit. Motor: No weakness.    Psychiatric:         Attention and Perception: Attention normal.         Mood and Affect: Mood normal.         Speech: Speech normal.         Behavior: Behavior normal.         Vital Signs  ED Triage Vitals   Temperature Pulse Respirations Blood Pressure SpO2   11/13/23 1854 11/13/23 1854 11/13/23 1854 11/13/23 1854 11/13/23 1854   97.7 °F (36.5 °C) 66 18 (!) 202/91 97 %      Temp Source Heart Rate Source Patient Position - Orthostatic VS BP Location FiO2 (%)   11/13/23 1854 11/13/23 1854 11/13/23 1854 11/13/23 1854 --   Temporal Monitor Lying Right arm       Pain Score       11/13/23 1926       10 - Worst Possible Pain           Vitals:    11/13/23 1854   BP: (!) 202/91   Pulse: 66   Patient Position - Orthostatic VS: Lying         Visual Acuity      ED Medications  Medications   lactated ringers infusion (has no administration in time range)   piperacillin-tazobactam (ZOSYN) 4.5 g in sodium chloride 0.9 % 100 mL IVPB (has no administration in time range)   ondansetron (ZOFRAN) injection 4 mg (4 mg Intravenous Given 11/13/23 1926)   morphine injection 2 mg (2 mg Intravenous Given 11/13/23 1926)   iohexol (OMNIPAQUE) 350 MG/ML injection (MULTI-DOSE) 100 mL (100 mL Intravenous Given 11/13/23 2047)       Diagnostic Studies  Results Reviewed       Procedure Component Value Units Date/Time    Lactic acid, plasma (w/reflex if result > 2.0) [[id number]]     Lab Status: No result Specimen: Blood Blood culture #1 [[id number]]     Lab Status: No result Specimen: Blood     Blood culture #2 [[id number]]     Lab Status: No result Specimen: Blood     Comprehensive metabolic panel [[id number]]  (Abnormal) Collected: 11/13/23 1922    Lab Status: Final result Specimen: Blood from Arm, Left Updated: 11/13/23 1944     Sodium 138 mmol/L      Potassium 4.1 mmol/L      Chloride 104 mmol/L      CO2 27 mmol/L      ANION GAP 7 mmol/L      BUN 22 mg/dL      Creatinine 0.85 mg/dL      Glucose 164 mg/dL      Calcium 9.0 mg/dL       U/L      ALT 40 U/L      Alkaline Phosphatase 138 U/L      Total Protein 6.3 g/dL      Albumin 4.4 g/dL      Total Bilirubin 1.28 mg/dL      eGFR 68 ml/min/1.73sq m     Narrative:      [facility] guidelines for Chronic Kidney Disease (CKD):     Stage 1 with normal or high GFR (GFR > 90 mL/min/1.73 square meters)    Stage 2 Mild CKD (GFR = 60-89 mL/min/1.73 square meters)    Stage 3A Moderate CKD (GFR = 45-59 mL/min/1.73 square meters)    Stage 3B Moderate CKD (GFR = 30-44 mL/min/1.73 square meters)    Stage 4 Severe CKD (GFR = 15-29 mL/min/1.73 square meters)    Stage 5 End Stage CKD (GFR <15 mL/min/1.73 square meters)  Note: GFR calculation is accurate only with a steady state creatinine    Lipase [[id number]]  (Normal) Collected: 11/13/23 1922    Lab Status: Final result Specimen: Blood from Arm, Left Updated: 11/13/23 1944     Lipase 24 u/L     CBC and differential [[id number]]  (Abnormal) Collected: 11/13/23 1922    Lab Status: Final result Specimen: Blood from Arm, Left Updated: 11/13/23 1926     WBC 11.89 Thousand/uL      RBC 4.06 Million/uL      Hemoglobin 12.1 g/dL      Hematocrit 38.9 %      MCV 96 fL      MCH 29.8 pg      MCHC 31.1 g/dL      RDW 12.1 %      MPV 10.2 fL      Platelets 368 Thousands/uL      nRBC 0 /100 WBCs      Neutrophils Relative 79 %      Immat GRANS % 1 %      Lymphocytes Relative 11 %      Monocytes Relative 8 %      Eosinophils Relative 1 % Basophils Relative 0 %      Neutrophils Absolute 9.43 Thousands/µL      Immature Grans Absolute 0.08 Thousand/uL      Lymphocytes Absolute 1.34 Thousands/µL      Monocytes Absolute 0.93 Thousand/µL      Eosinophils Absolute 0.08 Thousand/µL      Basophils Absolute 0.03 Thousands/µL                    CT abdomen pelvis with contrast   Final Result by Julius Rivera MD (11/13 2121)      Distended gallbladder with pericholecystic inflammatory fluid concerning for acute cholecystitis. Correlate with gallbladder ultrasound. Workstation performed: [id number]                    Procedures  Procedures         ED Course  ED Course as of 11/13/23 2145   [facility] Nov 13, 2023 2123 Patient with distended gallbladder and pericholecystic fluid with elevated LFTs and mild elevation white blood cell count. We will consult surgery. 2139 Case discussed with Dr. Tyler Hobbs. Admit to medicine service. Surgery in morning. SBIRT 20yo+      Flowsheet Row Most Recent Value   Initial Alcohol Screen: US AUDIT-C     1. How often do you have a drink containing alcohol? 0 Filed at: 11/13/2023 1854   2. How many drinks containing alcohol do you have on a typical day you are drinking? 0 Filed at: 11/13/2023 1854   3b. FEMALE Any Age, or MALE 65+: How often do you have 4 or more drinks on one occassion? 0 Filed at: 11/13/2023 1854   Audit-C Score 0 Filed at: 11/13/2023 1854   DEVAN: How many times in the past year have you. .. Used an illegal drug or used a prescription medication for non-medical reasons? Never Filed at: 11/13/2023 [address] [place] [address],  [address] Making  Patient presented to the emergency department and a MSE was performed. The patient was evaluated for complaint related to acute abdominal pain in a female patient.   Patient is potentially at risk for, but not limited to, acute gastritis, pancreatitis, biliary colic, cholecystitis, diverticulitis, diverticulosis, urinary infection, kidney stone, appendicitis, ulcerative colitis, Crohn's disease, enteritis, viral gastroenteritis, constipation, genitourinary infection, bowel obstruction or other disease process unrelated to the abdomen which may cause this symptomatology is also considered. Several of these diagnoses have been evaluated and ruled out by history and physical.  As needed, patient will be further evaluated with laboratory and imaging studies. Higher level diagnostics, such as CT imaging or ultrasound, may also be required. Please see work-up portion of the note for further evaluation of patient's risk. Socioeconomic factors were also considered as part of the decision-making process. Unless otherwise stated in the chart or patient is admitted as elsewhere documented, any previously prescribed medications will be maintained. Problems Addressed:  Abdominal pain: complicated acute illness or injury with systemic symptoms  Acute cholecystitis: complicated acute illness or injury with systemic symptoms    Amount and/or Complexity of Data Reviewed  Labs: ordered. Radiology: ordered. Risk  Prescription drug management. Decision regarding hospitalization. Emergency major surgery. Risk Details: Patient presented to the emergency department and a MSE was performed. The patient was evaluated and diagnosed with acute abdominal pain with acute cholecystitis. This is a new issue that will require additional planned work-up and treatment in a hospitalized setting. As may have been required as part of this evaluation, clinical laboratory test, radiology imaging and medical testing (I.e. EKG) were ordered as necessitated by the patient's presentation. I independently reviewed these studies, imaging and testing. This patient's case is considered to be a considerable risk secondary to the above listed disease process and poses a threat to the patient's well-being and baseline function.  Further in-patient diagnostic testing and management, which may include the administration of parenteral medications, is required. Disposition  Final diagnoses:   Abdominal pain   Acute cholecystitis     Time reflects when diagnosis was documented in both MDM as applicable and the Disposition within this note       Time User Action Codes Description Comment    11/13/2023  9:44 PM Bry Boyce Add [R10.9] Abdominal pain     11/13/2023  9:44 PM Bry Boyce Add [K81.0] Acute cholecystitis           ED Disposition       ED Disposition   Admit    Condition   Stable    Date/Time   Mon Nov 13, 2023 2144    Comment                  Follow-up Information    None         Patient's Medications   Discharge Prescriptions    No medications on file       No discharge procedures on file.     PDMP Review         Value Time User    PDMP Reviewed  Yes 10/30/2022  4:30 PM Luis Camacho, [address] [place] [address]            ED Provider  Electronically Signed by             Tucker Carcamo DO  11/13/23 9957

## 2023-11-14 NOTE — H&P
[address] [facility]  H&P  Name: Nabeel Durán 67 y.o. female I MRN: [id number]  Unit/Bed#: -01 I Date of Admission: 11/13/2023   Date of Service: 11/14/2023 I Hospital Day: 1      Assessment/Plan   * Acute cholecystitis  Assessment & Plan  Patient presented with abdominal pain. Leukocytosis upon presentation  White blood cells slightly elevated at 11.89  Check lactic  CT abdomen pelvis showing evidence of acute cholecystitis  Right upper quadrant ultrasound ordered  Case discussed ED provider with general surgery who recommended admission  N.p.o. at midnight  IV fluids Isolyte  Zosyn 3.375 g IV every 6 hours  Hold home aspirin  Pain control as needed    Hypertensive urgency  Assessment & Plan  Blood pressure is significantly elevated at 202/91 upon presentation  Continue home metoprolol tartrate 25 mg oral twice daily  Hydralazine ordered as needed    Gastro-esophageal reflux disease without esophagitis  Assessment & Plan  Protonix 40 mg IV daily    Acquired hypothyroidism  Assessment & Plan  Continue home levothyroxine         VTE Pharmacologic Prophylaxis: VTE Score: 7 High Risk (Score >/= 5) - Pharmacological DVT Prophylaxis Ordered: heparin. Sequential Compression Devices Ordered. Code Status: Level 3 - DNAR and DNI . Does accept intubation for Surgical procedure if needed. Discussion with family: Patient declined call to . Anticipated Length of Stay: Patient will be admitted on an inpatient basis with an anticipated length of stay of greater than 2 midnights secondary to Acute cholecystis . Total Time Spent on Date of Encounter in care of patient: 50 mins.  This time was spent on one or more of the following: performing physical exam; counseling and coordination of care; obtaining or reviewing history; documenting in the medical record; reviewing/ordering tests, medications or procedures; communicating with other healthcare professionals and discussing with patient's family/caregivers. Chief Complaint: Abdominal pain  History of Present Illness:  Guillaume Kowalski is a 67 y.o. female with a PMH of hyperlipidemia, hypothyroid, Parkinson's who presents with abdominal pain beginning today. Patient presented with abdominal pain and bloating with started today. She does report that the pain radiates to her back. She states that the pain was somewhat bearable throughout the day. However, she attempted to go to bingo tonight and while at [facility] the pain became unbearable causing her to come to the ED. She reports some constipation. No diarrhea. No nausea, vomiting. No fevers chills. No chest pain, shortness of breath. No headaches, lightheadedness, dizziness. No blood in stool. Patient was found to have evidence of acute cholecystitis on CT scan. Recommended for admission, IV antibiotics, n.p.o. with general surgery. Review of Systems:  Review of Systems   Constitutional:  Negative for chills and fever. HENT:  Negative for ear pain and sore throat. Eyes:  Negative for pain and visual disturbance. Respiratory:  Negative for cough, shortness of breath and wheezing. Cardiovascular:  Negative for chest pain, palpitations and leg swelling. Gastrointestinal:  Positive for abdominal pain and constipation. Negative for diarrhea, nausea and vomiting. Genitourinary:  Negative for dysuria and hematuria. Musculoskeletal:  Negative for arthralgias and back pain. Skin:  Negative for color change and rash. Neurological:  Negative for seizures and syncope. Psychiatric/Behavioral:  Negative for agitation and confusion. All other systems reviewed and are negative.       Past Medical and Surgical History:   Past Medical History:   Diagnosis Date    HLD (hyperlipidemia)     Hypothyroid     Parkinson disease        Past Surgical History:   Procedure Laterality Date    ORIF TIBIA & FIBULA FRACTURES Left 10/31/2022    Procedure: OPEN REDUCTION W/ INTERNAL FIXATION (ORIF) ANKLE;  Surgeon: Aurelio Hicks; Location:  MAIN OR;  Service: Orthopedics       Meds/Allergies:  Prior to Admission medications    Medication Sig Start Date End Date Taking?  Authorizing Provider   acetaminophen (TYLENOL) 325 mg tablet Take 3 tablets (975 mg total) by mouth every 8 (eight) hours 11/4/22  Yes BOLIVAR Stovall   aspirin 81 mg chewable tablet Chew 81 mg daily   Yes Historical Provider, MD   carbidopa-levodopa (SINEMET)  mg per tablet Take 1 tablet by mouth Three times a day   Yes Historical Provider, MD   cholecalciferol (VITAMIN D3) 1,000 units tablet Take 1,000 Units by mouth daily TAKES 2,000 UNITS   Yes Historical Provider, MD   ezetimibe (ZETIA) 10 mg tablet Take by mouth 5/24/22  Yes Historical Provider, MD   glycerin-hypromellose- (ARTIFICIAL TEARS) 0.2-0.2-1 % SOLN Administer 1 drop to both eyes every 4 (four) hours as needed (dry eye) 11/4/22  Yes BOLIVAR Stovall   levothyroxine 50 mcg tablet TAKE 1 TABLET BY MOUTH DAILY AT LEAST 30 MINUTES PRIOR TO FIRST MEAL OF THE DAY OR OTHER MEDICATIONS 5/24/22  Yes Historical Provider, MD   metoprolol tartrate (LOPRESSOR) 25 mg tablet Take by mouth 12/8/21  Yes Historical Provider, MD   omeprazole (PriLOSEC OTC) 20 MG tablet Take by mouth   Yes Historical Provider, MD   Pimavanserin Tartrate (Nuplazid) 34 MG CAPS daily at bedtime 3/23/22  Yes Historical Provider, MD   aluminum-magnesium hydroxide-simethicone (MAALOX) 377-294-76 MG/5ML SUSP Take 30 mL by mouth 4 (four) times a day (before meals and at bedtime)  Patient not taking: Reported on 11/13/2023    Historical Provider, MD   bisacodyl (DULCOLAX) 10 mg suppository Insert 10 mg into the rectum daily  Patient not taking: Reported on 11/13/2023    Historical Provider, MD   enoxaparin (LOVENOX) 40 mg/0.4 mL Inject 0.4 mL (40 mg total) under the skin daily While at rehab- when discharge change to aspirin 325mg BID through 11/21 Do not start before November 5, 2022.  Patient not taking: Reported on 11/13/2023 11/5/22   BOLIVAR Hall   magnesium hydroxide (MILK OF MAGNESIA) 400 mg/5 mL oral suspension Take by mouth daily as needed for constipation  Patient not taking: Reported on 11/13/2023    Historical Provider, MD   oxybutynin (DITROPAN) 5 mg tablet Take 1 tablet (5 mg total) by mouth 2 (two) times a day  Patient not taking: Reported on 11/13/2023 11/4/22   BOLIVAR Hall   oxyCODONE (ROXICODONE) 5 immediate release tablet Take 1 tablet (5 mg total) by mouth every 6 (six) hours as needed for moderate pain for up to 6 doses Max Daily Amount: 20 mg  Patient not taking: Reported on 11/13/2023 11/4/22   BOLIVAR Hall   polyethylene glycol (MIRALAX) 17 g packet Take 17 g by mouth daily  Patient not taking: Reported on 11/13/2023 11/4/22   BOLIVAR Hall   senna-docusate sodium (SENOKOT S) 8.6-50 mg per tablet Take 2 tablets by mouth 2 (two) times a day as needed for constipation  Patient not taking: Reported on 11/13/2023 11/4/22   BOLIVAR Hall   simvastatin (ZOCOR) 10 mg tablet Take by mouth  Patient not taking: Reported on 11/13/2023    Historical Provider, MD   Sodium Phosphates (ENEMA RE) Insert into the rectum  Patient not taking: Reported on 11/13/2023    Historical Provider, MD   Thiamine HCl (VITAMIN B-1 PO) Take 1 tablet by mouth 3 (three) times a day  Patient not taking: Reported on 11/13/2023    Historical Provider, MD     I have reviewed home medications with patient personally. Allergies:    Allergies   Allergen Reactions    Lidocaine Anaphylaxis    Mushroom Extract Complex - Food Allergy Anaphylaxis and Throat Swelling    Butamben-Tetracaine-Benzocaine Edema    Codeine GI Intolerance     Upset stomach      Sertraline Itching     Reaction unknown      Statins Myalgia       Social History:  Marital Status: /Civil Union   Occupation: retired  Patient Pre-hospital Living Situation: Home  Patient Pre-hospital Level of Mobility: walks  Patient Pre-hospital Diet Restrictions: none   Substance Use History:   Social History     Substance and Sexual Activity   Alcohol Use Not Currently     Social History     Tobacco Use   Smoking Status Never   Smokeless Tobacco Never     Social History     Substance and Sexual Activity   Drug Use Never       Family History:  History reviewed. No pertinent family history. Physical Exam:     Vitals:   Blood Pressure: 170/72 (11/13/23 2354)  Pulse: 73 (11/13/23 2354)  Temperature: 97.6 °F (36.4 °C) (11/13/23 2354)  Temp Source: Temporal (11/13/23 2354)  Respirations: 16 (11/13/23 2354)  Height: 5' 2" (157.5 cm) (11/13/23 2354)  Weight - Scale: 93.3 kg (205 lb 11 oz) (11/13/23 2354)  SpO2: 96 % (11/13/23 2217)    Physical Exam  Vitals and nursing note reviewed. Constitutional:       General: She is not in acute distress. Appearance: She is well-developed. She is not ill-appearing. HENT:      Head: Normocephalic and atraumatic. Eyes:      Conjunctiva/sclera: Conjunctivae normal.   Cardiovascular:      Rate and Rhythm: Normal rate and regular rhythm. Pulses: Normal pulses. Heart sounds: Normal heart sounds. No murmur heard. No friction rub. No gallop. Pulmonary:      Effort: Pulmonary effort is normal. No respiratory distress. Breath sounds: Normal breath sounds. No wheezing, rhonchi or rales. Abdominal:      General: Abdomen is flat. Bowel sounds are normal.      Palpations: Abdomen is soft. Tenderness: There is abdominal tenderness in the right upper quadrant and epigastric area. Musculoskeletal:         General: No swelling. Cervical back: Neck supple. Right lower leg: No edema. Left lower leg: No edema. Skin:     General: Skin is warm and dry. Capillary Refill: Capillary refill takes less than 2 seconds. Neurological:      General: No focal deficit present.       Mental Status: She is alert and oriented to person, place, and time. Mental status is at baseline. Cranial Nerves: No cranial nerve deficit. Motor: No weakness. Coordination: Coordination normal.   Psychiatric:         Mood and Affect: Mood normal.         Behavior: Behavior normal.          Additional Data:     Lab Results:  Results from last 7 days   Lab Units 11/13/23  1922   WBC Thousand/uL 11.89*   HEMOGLOBIN g/dL 12.1   HEMATOCRIT % 38.9   PLATELETS Thousands/uL 245   NEUTROS PCT % 79*   LYMPHS PCT % 11*   MONOS PCT % 8   EOS PCT % 1     Results from last 7 days   Lab Units 11/13/23  1922   SODIUM mmol/L 138   POTASSIUM mmol/L 4.1   CHLORIDE mmol/L 104   CO2 mmol/L 27   BUN mg/dL 22   CREATININE mg/dL 0.85   ANION GAP mmol/L 7   CALCIUM mg/dL 9.0   ALBUMIN g/dL 4.4   TOTAL BILIRUBIN mg/dL 1.28*   ALK PHOS U/L 138*   ALT U/L 40   AST U/L 146*   GLUCOSE RANDOM mg/dL 164*                 Results from last 7 days   Lab Units 11/13/23  2150   LACTIC ACID mmol/L 1.0       Lines/Drains:  Invasive Devices       Peripheral Intravenous Line  Duration             Peripheral IV 11/13/23 Left Antecubital <1 day              Drain  Duration             External Urinary Catheter 378 days                        Imaging: Reviewed radiology reports from this admission including: abdominal/pelvic CT  CT abdomen pelvis with contrast   Final Result by Tre Ventura MD (11/13 2121)      Distended gallbladder with pericholecystic inflammatory fluid concerning for acute cholecystitis. Correlate with gallbladder ultrasound. Workstation performed: [id number]         US right upper quadrant    (Results Pending)       EKG and Other Studies Reviewed on Admission:   EKG: No EKG obtained. ** Please Note: This note has been constructed using a voice recognition system.  **

## 2023-11-14 NOTE — OP NOTE
OPERATIVE REPORT  PATIENT NAME: Guillaume Kowalski    :  1950  MRN: [id number]  Pt Location: [facility] OR ROOM 01    SURGERY DATE: 2023    Surgeon(s) and Role:     * Sudhakar Yuen DO - Primary     * Melly Epperson PA-C - Assisting    Preop Diagnosis:  Acute cholecystitis [K81.0]    Post-Op Diagnosis Codes:     * Acute cholecystitis [K81.0]    Procedure(s):  CHOLECYSTECTOMY LAPAROSCOPIC    Specimen(s):  ID Type Source Tests Collected by Time Destination   1 : gall bladder Tissue Gallbladder TISSUE EXAM Sudhakar Yuen DO 2023  6:05 PM        Estimated Blood Loss:   Minimal    Drains:  External Urinary Catheter (Active)   Number of days: 378       Anesthesia Type:   General    Operative Indications:  Acute cholecystitis [K81.0]      Operative Findings:  Acute calculus cholecystitis with edema of the gallbladder wall. A pulsating vessel was present within the hepatic fossa. A moderate amount of bleeding was encountered. This was able to be controlled with cautery and Surgicel. Complications:   None    Procedure and Technique:  The patient was brought to the operating room. A time-out was held and the patient identified and procedure verified. Appropriate prophylaxis and DVT prophylaxis was used. General anesthesia was administered. The area of the abdomen is prepped and draped in the usual sterile fashion. Local anesthesia using 0.25% Marcaine with epinephrine was infiltrated in the supraumbilical area. A small incision was made using 11 blade scalpel. The skin was grasped and elevated using towel clamps. A Veress needle was placed and confirmed to be intraperitoneal using a saline drop test.  The abdomen was insufflated to 15 mmHg intraperitoneal pressure. A 5 mm trocar was placed. The abdomen was inspected and found to be free of adhesions.   An additional 11 mm trocar was placed in the epigastric area this was done after infiltration of local anesthesia and under direct visualization. Two additional 5 mm trocars were placed in the right upper quadrant in the same fashion. The patient was placed in reverse Trendelenburg position and rotated towards the left side. The fundus of the gallbladder was grasped and elevated anteriorly and superiorly. Any adhesions to the gallbladder were taken down using blunt dissection and electrocautery. Galindo's pouch was identified. The peritoneum surrounding Galindo's pouch was incised. The cystic duct was identified. This was freed of all surrounding tissue. The cystic artery was also freed of all surrounding tissue. The critical view was obtained. Two clips were placed distally and 1 proximally on the cystic duct. The duct was divided between clips. The cystic artery was clipped and divided in the same manner. Hook electrocautery was then used to free the gallbladder from the hepatic fossa. The gallbladder was placed within a 10 mm Endo-Catch bag and removed through the epigastric port site. The hepatic fossa was inspected. A pulsating vessel was present within the hepatic fossa. The distal aspect of this was slightly oozing. This was able to be controlled with electrocautery and Surgicel. Remainder of hemostasis was achieved using electrocautery. Fascia of the 11 mm trocar site was closed using a suture of 0 Vicryl with the laparoscopic suture Passer. All trocars were removed and the abdomen was desufflated. Skin was closed using 4 0 Vicryl in the subcuticular layer. All incisions were covered with skin glue. The patient tolerated the procedure well was transferred to recovery in stable condition. At the end the procedure all needle instrument sponge counts were correct x2. I was present for the entire procedure. and A physician assistant was required during the procedure for retraction, tissue handling, dissection and suturing.     Patient Disposition:  PACU         SIGNATURE: Alpesh Urena DO  DATE: November 14, 2023  TIME: 6:36 PM

## 2023-11-14 NOTE — ANESTHESIA POSTPROCEDURE EVALUATION
Post-Op Assessment Note    CV Status:  Stable    Pain management: satisfactory to patient       Mental Status:  Sleepy   Hydration Status:  Stable   PONV Controlled:  None   Airway Patency:  Patent  Airway: intubated     Post Op Vitals Reviewed: Yes    No anethesia notable event occurred.     Staff: CRNA               /66 (11/14/23 1840)    Temp (!) 97.2 °F (36.2 °C) (11/14/23 1840)    Pulse 58 (11/14/23 1840)   Resp 16 (11/14/23 1840)    SpO2 99 % (11/14/23 1840)

## 2023-11-14 NOTE — PLAN OF CARE
Problem: PAIN - ADULT  Goal: Verbalizes/displays adequate comfort level or baseline comfort level  Description: Interventions:  - Encourage patient to monitor pain and request assistance  - Assess pain using appropriate pain scale  - Administer analgesics based on type and severity of pain and evaluate response  - Implement non-pharmacological measures as appropriate and evaluate response  - Consider cultural and social influences on pain and pain management  - Notify physician/advanced practitioner if interventions unsuccessful or patient reports new pain  Outcome: Progressing     Problem: INFECTION - ADULT  Goal: Absence or prevention of progression during hospitalization  Description: INTERVENTIONS:  - Assess and monitor for signs and symptoms of infection  - Monitor lab/diagnostic results  - Monitor all insertion sites, i.e. indwelling lines, tubes, and drains  - Monitor endotracheal if appropriate and nasal secretions for changes in amount and color  - [place] appropriate cooling/warming therapies per order  - Administer medications as ordered  - Instruct and encourage patient and family to use good hand hygiene technique  - Identify and instruct in appropriate isolation precautions for identified infection/condition  Outcome: Progressing  Goal: Absence of fever/infection during neutropenic period  Description: INTERVENTIONS:  - Monitor WBC    Outcome: Progressing     Problem: SAFETY ADULT  Goal: Patient will remain free of falls  Description: INTERVENTIONS:  - Educate patient/family on patient safety including physical limitations  - Instruct patient to call for assistance with activity   - Consult OT/PT to assist with strengthening/mobility   - Keep Call bell within reach  - Keep bed low and locked with side rails adjusted as appropriate  - Keep care items and personal belongings within reach  - Initiate and maintain comfort rounds  - Make Fall Risk Sign visible to staff  - Offer Toileting every  Hours, in advance of need  - Initiate/Maintain alarm  - Obtain necessary fall risk management equipment:   - Apply yellow socks and bracelet for high fall risk patients  - Consider moving patient to room near nurses station  Outcome: Progressing  Goal: Maintain or return to baseline ADL function  Description: INTERVENTIONS:  -  Assess patient's ability to carry out ADLs; assess patient's baseline for ADL function and identify physical deficits which impact ability to perform ADLs (bathing, care of mouth/teeth, toileting, grooming, dressing, etc.)  - Assess/evaluate cause of self-care deficits   - Assess range of motion  - Assess patient's mobility; develop plan if impaired  - Assess patient's need for assistive devices and provide as appropriate  - Encourage maximum independence but intervene and supervise when necessary  - Involve family in performance of ADLs  - Assess for home care needs following discharge   - Consider OT consult to assist with ADL evaluation and planning for discharge  - Provide patient education as appropriate  Outcome: Progressing  Goal: Maintains/Returns to pre admission functional level  Description: INTERVENTIONS:  - Perform BMAT or MOVE assessment daily.   - Set and communicate daily mobility goal to care team and patient/family/caregiver. - Collaborate with rehabilitation services on mobility goals if consulted  - Perform Range of Motion  times a day. - Reposition patient every  hours.   - Dangle patient  times a day  - Stand patient  times a day  - Ambulate patient  times a day  - Out of bed to chair  times a day   - Out of bed for meals times a day  - Out of bed for toileting  - Record patient progress and toleration of activity level   Outcome: Progressing     Problem: DISCHARGE PLANNING  Goal: Discharge to home or other facility with appropriate resources  Description: INTERVENTIONS:  - Identify barriers to discharge w/patient and caregiver  - Arrange for needed discharge resources and transportation as appropriate  - Identify discharge learning needs (meds, wound care, etc.)  - Arrange for interpretive services to assist at discharge as needed  - Refer to Case Management Department for coordinating discharge planning if the patient needs post-hospital services based on physician/advanced practitioner order or complex needs related to functional status, cognitive ability, or social support system  Outcome: Progressing     Problem: Knowledge Deficit  Goal: Patient/family/caregiver demonstrates understanding of disease process, treatment plan, medications, and discharge instructions  Description: Complete learning assessment and assess knowledge base.   Interventions:  - Provide teaching at level of understanding  - Provide teaching via preferred learning methods  Outcome: Progressing

## 2023-11-14 NOTE — CASE MANAGEMENT
Case Management Assessment & Discharge Planning Note    Patient name Sherri Fields  Location /-36 MRN [id number]  : 1950 Date 2023       Current Admission Date: 2023  Current Admission Diagnosis:Acute cholecystitis   Patient Active Problem List    Diagnosis Date Noted    Acute cholecystitis 2023    Morbid obesity due to excess calories ([address]) 2022    Frequent urination 2022    Fall down steps 10/30/2022    Closed fracture dislocation of left ankle 10/30/2022    Parkinson's disease 10/30/2022    Hypertensive urgency 10/30/2022    Gastro-esophageal reflux disease without esophagitis 01/10/2022    Chronic obstructive pulmonary disease ([address]) 2021    Acquired hypothyroidism 2015      LOS (days): 1  Geometric Mean LOS (GMLOS) (days): 2.20  Days to GMLOS:1.5     OBJECTIVE:    Risk of Unplanned Readmission Score: 14.43         Current admission status: Inpatient  Referral Reason:  (discharge planning)    Preferred Pharmacy:   [address] [place] [address] [address]  [address] [place] [address] [address] [address]  Phone: [phone] Fax: [address], [address] [address]  Phone: [phone] Fax: [phone]    Primary Care Provider: Hector Power DO    Primary Insurance: Flavia Abdi [facility]  Secondary Insurance:     ASSESSMENT:  CM met with patient at the bedside,baseline information  was obtained. CM discussed the role of CM in helping the patient develop a discharge plan and assist the patient in carry out their plan. Active Health Care Proxies    There are no active Health Care Proxies on file.        Advance Directives  Does patient have a Health Care POA?: No  Was patient offered paperwork?: No (pt declined)  Does patient currently have a Health Care decision maker?: Yes, please see Health Care Proxy section  Does patient have Advance Directives?: No  Was patient offered paperwork?: No (pt declined)  Primary Contact: Tarun Turpin (Spouse) [phone]         Readmission Root Cause  30 Day Readmission: No    Patient Information  Admitted from[de-identified] Home  Mental Status: Alert  During Assessment patient was accompanied by: Spouse  Assessment information provided by[de-identified] Spouse, Patient  Primary Caregiver: Self (spouse deos assist)  Support Systems: Self, Spouse/significant other, [address] of Residence: [address] [place] [address] do you live in?: [address] [facility] entry access options. Select all that apply.: Stairs  Number of steps to enter home. : 1  Do the steps have railings?: No  Type of Current Residence: Other (Comment) (2nd floor apartment)  In the last 12 months, was there a time when you were not able to pay the mortgage or rent on time?: No  In the last 12 months, how many places have you lived?: 1  In the last 12 months, was there a time when you did not have a steady place to sleep or slept in a shelter (including now)?: No  Homeless/housing insecurity resource given?: No  Living Arrangements: Lives w/ Spouse/significant other  Is patient a ?: No    Activities of Daily Living Prior to Admission  Functional Status: Assistance  Completes ADLs independently?: No  Level of ADL dependence: Assistance (spouse assists with bathing, dressing, and cooking)  Ambulates independently?: Yes  Does patient use assisted devices?: Yes  Assisted Devices (DME) used: Rollator, Straight Cane, Shower Chair, Other (Comment) (grabbars)  Does patient currently own DME?: Yes  What DME does the patient currently own?: Rollator, Shower Chair, [address], Other (Comment), Wheelchair (grabbars)  Does patient have a history of Outpatient Therapy (PT/OT)?: Yes  Does the patient have a history of Short-Term Rehab?: Yes ([facility])  Does patient have a history of HHC?: Yes (pt was unsure of the name)  Does patient currently have [address]?: No         Patient Information Continued  Income Source: SSI/SSD  Does patient have prescription coverage?: Yes  Within the past 12 months, you worried that your food would run out before you got the money to buy more.: Never true  Within the past 12 months, the food you bought just didn't last and you didn't have money to get more.: Never true  Food insecurity resource given?: No  Does patient receive dialysis treatments?: No  Does patient have a history of substance abuse?: No  Does patient have a history of Mental Health Diagnosis?: No         Means of Transportation  Means of Transport to Appts[de-identified] Family transport  In the past 12 months, has lack of transportation kept you from medical appointments or from getting medications?: No  In the past 12 months, has lack of transportation kept you from meetings, work, or from getting things needed for daily living?: No  Was application for public transport provided?: No        DISCHARGE DETAILS:  CM met with pt and spouse to discuss possible dc needs. Pt stated she does not think she will need anything upon dc. Pt is waiting on results of MRI of gallbladder. Pt stated she may need to be transferred to another hospital depending on results. Pt stated her PCP is through [facility]. Pt is agreeable for I week dc appt with pcp. Pt's PCP recently passed away so she would need a new pcp through practice. CM will follow the case for dc needs. Discharge planning discussed with[de-identified] pt and spouse  Freedom of Choice: Yes  Comments - Freedom of Choice: Pt indicated no dc needs  CM contacted family/caregiver?: Yes  Were Treatment Team discharge recommendations reviewed with patient/caregiver?: Yes  Did patient/caregiver verbalize understanding of patient care needs?: Yes  Were patient/caregiver advised of the risks associated with not following Treatment Team discharge recommendations?: Yes    Contacts  Patient Contacts: Tanika Beaulieu spouse  Relationship to Patient[de-identified] Family  Contact Method:  In Person  Reason/Outcome: Discharge [facility]         Is the patient interested in [address] [facility] at discharge?: No    DME Referral Provided  Referral made for DME?: No    Other Referral/Resources/Interventions Provided:  Interventions: None Indicated

## 2023-11-15 VITALS
HEIGHT: 62 IN | SYSTOLIC BLOOD PRESSURE: 152 MMHG | DIASTOLIC BLOOD PRESSURE: 75 MMHG | WEIGHT: 205 LBS | RESPIRATION RATE: 18 BRPM | TEMPERATURE: 97.5 F | BODY MASS INDEX: 37.73 KG/M2 | OXYGEN SATURATION: 94 % | HEART RATE: 71 BPM

## 2023-11-15 PROBLEM — R74.01 TRANSAMINITIS: Status: ACTIVE | Noted: 2023-11-15

## 2023-11-15 PROBLEM — R78.81 POSITIVE BLOOD CULTURE: Status: ACTIVE | Noted: 2023-11-15

## 2023-11-15 PROBLEM — K81.0 ACUTE CHOLECYSTITIS: Status: RESOLVED | Noted: 2023-11-13 | Resolved: 2023-11-15

## 2023-11-15 PROBLEM — I10 PRIMARY HYPERTENSION: Status: ACTIVE | Noted: 2022-10-30

## 2023-11-15 LAB
ALBUMIN SERPL BCP-MCNC: 3.8 G/DL (ref 3.5–5)
ALP SERPL-CCNC: 157 U/L (ref 34–104)
ALT SERPL W P-5'-P-CCNC: 118 U/L (ref 7–52)
ANION GAP SERPL CALCULATED.3IONS-SCNC: 9 MMOL/L
AST SERPL W P-5'-P-CCNC: 145 U/L (ref 13–39)
BASOPHILS # BLD AUTO: 0.01 THOUSANDS/ÂΜL (ref 0–0.1)
BASOPHILS NFR BLD AUTO: 0 % (ref 0–1)
BILIRUB SERPL-MCNC: 1.14 MG/DL (ref 0.2–1)
BUN SERPL-MCNC: 14 MG/DL (ref 5–25)
CALCIUM SERPL-MCNC: 8.6 MG/DL (ref 8.4–10.2)
CHLORIDE SERPL-SCNC: 104 MMOL/L (ref 96–108)
CO2 SERPL-SCNC: 25 MMOL/L (ref 21–32)
CREAT SERPL-MCNC: 0.75 MG/DL (ref 0.6–1.3)
EOSINOPHIL # BLD AUTO: 0 THOUSAND/ÂΜL (ref 0–0.61)
EOSINOPHIL NFR BLD AUTO: 0 % (ref 0–6)
ERYTHROCYTE [DISTWIDTH] IN BLOOD BY AUTOMATED COUNT: 12.2 % (ref 11.6–15.1)
GFR SERPL CREATININE-BSD FRML MDRD: 79 ML/MIN/1.73SQ M
GLUCOSE SERPL-MCNC: 124 MG/DL (ref 65–140)
HCT VFR BLD AUTO: 37.4 % (ref 34.8–46.1)
HGB BLD-MCNC: 11.7 G/DL (ref 11.5–15.4)
IMM GRANULOCYTES # BLD AUTO: 0.07 THOUSAND/UL (ref 0–0.2)
IMM GRANULOCYTES NFR BLD AUTO: 1 % (ref 0–2)
LYMPHOCYTES # BLD AUTO: 0.67 THOUSANDS/ÂΜL (ref 0.6–4.47)
LYMPHOCYTES NFR BLD AUTO: 8 % (ref 14–44)
MCH RBC QN AUTO: 29.8 PG (ref 26.8–34.3)
MCHC RBC AUTO-ENTMCNC: 31.3 G/DL (ref 31.4–37.4)
MCV RBC AUTO: 95 FL (ref 82–98)
MONOCYTES # BLD AUTO: 0.2 THOUSAND/ÂΜL (ref 0.17–1.22)
MONOCYTES NFR BLD AUTO: 2 % (ref 4–12)
NEUTROPHILS # BLD AUTO: 7.77 THOUSANDS/ÂΜL (ref 1.85–7.62)
NEUTS SEG NFR BLD AUTO: 89 % (ref 43–75)
NRBC BLD AUTO-RTO: 0 /100 WBCS
PLATELET # BLD AUTO: 245 THOUSANDS/UL (ref 149–390)
PMV BLD AUTO: 10.3 FL (ref 8.9–12.7)
POTASSIUM SERPL-SCNC: 4.1 MMOL/L (ref 3.5–5.3)
PROT SERPL-MCNC: 6.1 G/DL (ref 6.4–8.4)
RBC # BLD AUTO: 3.92 MILLION/UL (ref 3.81–5.12)
SODIUM SERPL-SCNC: 138 MMOL/L (ref 135–147)
WBC # BLD AUTO: 8.72 THOUSAND/UL (ref 4.31–10.16)

## 2023-11-15 PROCEDURE — C9113 INJ PANTOPRAZOLE SODIUM, VIA: HCPCS | Performed by: STUDENT IN AN ORGANIZED HEALTH CARE EDUCATION/TRAINING PROGRAM

## 2023-11-15 PROCEDURE — 85025 COMPLETE CBC W/AUTO DIFF WBC: CPT | Performed by: STUDENT IN AN ORGANIZED HEALTH CARE EDUCATION/TRAINING PROGRAM

## 2023-11-15 PROCEDURE — 99238 HOSP IP/OBS DSCHRG MGMT 30/<: CPT | Performed by: PHYSICIAN ASSISTANT

## 2023-11-15 PROCEDURE — 80053 COMPREHEN METABOLIC PANEL: CPT | Performed by: STUDENT IN AN ORGANIZED HEALTH CARE EDUCATION/TRAINING PROGRAM

## 2023-11-15 RX ADMIN — LEVOTHYROXINE SODIUM 50 MCG: 50 TABLET ORAL at 07:34

## 2023-11-15 RX ADMIN — Medication 1000 UNITS: at 09:50

## 2023-11-15 RX ADMIN — METOPROLOL TARTRATE 25 MG: 25 TABLET, FILM COATED ORAL at 09:50

## 2023-11-15 RX ADMIN — PANTOPRAZOLE SODIUM 40 MG: 40 INJECTION, POWDER, FOR SOLUTION INTRAVENOUS at 09:50

## 2023-11-15 RX ADMIN — ACETAMINOPHEN 975 MG: 325 TABLET, FILM COATED ORAL at 07:34

## 2023-11-15 RX ADMIN — HEPARIN SODIUM 5000 UNITS: 5000 INJECTION INTRAVENOUS; SUBCUTANEOUS at 07:35

## 2023-11-15 RX ADMIN — CARBIDOPA AND LEVODOPA 1 TABLET: 25; 100 TABLET ORAL at 09:50

## 2023-11-15 RX ADMIN — SODIUM CHLORIDE, SODIUM GLUCONATE, SODIUM ACETATE, POTASSIUM CHLORIDE, MAGNESIUM CHLORIDE, SODIUM PHOSPHATE, DIBASIC, AND POTASSIUM PHOSPHATE 75 ML/HR: .53; .5; .37; .037; .03; .012; .00082 INJECTION, SOLUTION INTRAVENOUS at 05:10

## 2023-11-15 NOTE — PLAN OF CARE
Problem: PAIN - ADULT  Goal: Verbalizes/displays adequate comfort level or baseline comfort level  Description: Interventions:  - Encourage patient to monitor pain and request assistance  - Assess pain using appropriate pain scale0-10  - Administer analgesics based on type and severity of pain and evaluate response  - Implement non-pharmacological measures as appropriate and evaluate response  - Consider cultural and social influences on pain and pain management  - Notify physician/advanced practitioner if interventions unsuccessful or patient reports new pain  11/15/2023 1500 by Oren Goodpasture, RN  Outcome: Adequate for Discharge  11/15/2023 1059 by Oren Goodpasture, RN  Outcome: Progressing     Problem: INFECTION - ADULT  Goal: Absence or prevention of progression during hospitalization  Description: INTERVENTIONS:  - Assess and monitor for signs and symptoms of infection  - Monitor lab/diagnostic results  - Monitor all insertion sites, i.e. indwelling lines, tubes, and drains  - Monitor endotracheal if appropriate and nasal secretions for changes in amount and color  - [place] appropriate cooling/warming therapies per order  - Administer medications as ordered  - Instruct and encourage patient and family to use good hand hygiene technique  - Identify and instruct in appropriate isolation precautions for identified infection/condition  11/15/2023 1500 by Oren Goodpasture, RN  Outcome: Adequate for Discharge  11/15/2023 1059 by Oren Goodpasture, RN  Outcome: Progressing  Goal: Absence of fever/infection during neutropenic period  Description: INTERVENTIONS:  - Monitor WBC    11/15/2023 1500 by Oren Goodpasture, RN  Outcome: Adequate for Discharge  11/15/2023 1059 by Oren Goodpasture, RN  Outcome: Progressing     Problem: SAFETY ADULT  Goal: Patient will remain free of falls  Description: INTERVENTIONS:  - Educate patient/family on patient safety including physical limitations  - Instruct patient to call for assistance with activity   - Consult OT/PT to assist with strengthening/mobility   - Keep Call bell within reach  - Keep bed low and locked with side rails adjusted as appropriate  - Keep care items and personal belongings within reach  - Initiate and maintain comfort rounds  - Make Fall Risk Sign visible to staff  - Offer Toileting every . Hours, in advance of need  - Initiate/Maintain . alarm  - Obtain necessary fall risk management equipment: . - Apply yellow socks and bracelet for high fall risk patients  - Consider moving patient to room near nurses station  11/15/2023 1500 by Dafne Yanez RN  Outcome: Adequate for Discharge  11/15/2023 1059 by Dafne Yanez RN  Outcome: Progressing  Goal: Maintain or return to baseline ADL function  Description: INTERVENTIONS:  -  Assess patient's ability to carry out ADLs; assess patient's baseline for ADL function and identify physical deficits which impact ability to perform ADLs (bathing, care of mouth/teeth, toileting, grooming, dressing, etc.)  - Assess/evaluate cause of self-care deficits   - Assess range of motion  - Assess patient's mobility; develop plan if impaired  - Assess patient's need for assistive devices and provide as appropriate  - Encourage maximum independence but intervene and supervise when necessary  - Involve family in performance of ADLs  - Assess for home care needs following discharge   - Consider OT consult to assist with ADL evaluation and planning for discharge  - Provide patient education as appropriate  11/15/2023 1500 by Dafne Yanez RN  Outcome: Adequate for Discharge  11/15/2023 1059 by Dafne Yanez RN  Outcome: Progressing  Goal: Maintains/Returns to pre admission functional level  Description: INTERVENTIONS:  - Perform BMAT or MOVE assessment daily.   - Set and communicate daily mobility goal to care team and patient/family/caregiver. - Collaborate with rehabilitation services on mobility goals if consulted  - Perform Range of Motion . times a day. - Reposition patient every . hours.  - Dangle patient . times a day  - Stand patient . times a day  - Ambulate patient . times a day  - Out of bed to chair . times a day   - Out of bed for meals . times a day  - Out of bed for toileting  - Record patient progress and toleration of activity level   11/15/2023 1500 by Ty Lozano RN  Outcome: Adequate for Discharge  11/15/2023 1059 by Ty Lozano RN  Outcome: Progressing     Problem: DISCHARGE PLANNING  Goal: Discharge to home or other facility with appropriate resources  Description: INTERVENTIONS:  - Identify barriers to discharge w/patient and caregiver  - Arrange for needed discharge resources and transportation as appropriate  - Identify discharge learning needs (meds, wound care, etc.)  - Arrange for interpretive services to assist at discharge as needed  - Refer to Case Management Department for coordinating discharge planning if the patient needs post-hospital services based on physician/advanced practitioner order or complex needs related to functional status, cognitive ability, or social support system  11/15/2023 1500 by Ty Lozano RN  Outcome: Adequate for Discharge  11/15/2023 1059 by Ty Lozano RN  Outcome: Progressing     Problem: Knowledge Deficit  Goal: Patient/family/caregiver demonstrates understanding of disease process, treatment plan, medications, and discharge instructions  Description: Complete learning assessment and assess knowledge base.   Interventions:  - Provide teaching at level of understanding  - Provide teaching via preferred learning methods  11/15/2023 1500 by Ty Lozano RN  Outcome: Adequate for Discharge  11/15/2023 1059 by Ty Lzoano RN  Outcome: Progressing     Problem: MOBILITY - ADULT  Goal: Maintain or return to baseline ADL function  Description: INTERVENTIONS:  -  Assess patient's ability to carry out ADLs; assess patient's baseline for ADL function and identify physical deficits which impact ability to perform ADLs (bathing, care of mouth/teeth, toileting, grooming, dressing, etc.)  - Assess/evaluate cause of self-care deficits   - Assess range of motion  - Assess patient's mobility; develop plan if impaired  - Assess patient's need for assistive devices and provide as appropriate  - Encourage maximum independence but intervene and supervise when necessary  - Involve family in performance of ADLs  - Assess for home care needs following discharge   - Consider OT consult to assist with ADL evaluation and planning for discharge  - Provide patient education as appropriate  11/15/2023 1500 by Jaison Robbins RN  Outcome: Adequate for Discharge  11/15/2023 1059 by Jaison Robbins RN  Outcome: Progressing  Goal: Maintains/Returns to pre admission functional level  Description: INTERVENTIONS:  - Perform BMAT or MOVE assessment daily.   - Set and communicate daily mobility goal to care team and patient/family/caregiver. - Collaborate with rehabilitation services on mobility goals if consulted  - Perform Range of Motion . times a day. - Reposition patient every . hours. - Dangle patient . times a day  - Stand patient . .. times a day  - Ambulate patient . times a day  - Out of bed to chair . times a day   - Out of bed for meals .  times a day  - Out of bed for toileting  - Record patient progress and toleration of activity level   11/15/2023 1500 by Jaison Robbins RN  Outcome: Adequate for Discharge  11/15/2023 1059 by Jaison Robbins RN  Outcome: Progressing

## 2023-11-15 NOTE — ASSESSMENT & PLAN NOTE
1/2 admission blood cultures growing GPC in clusters, BCID shows staph epidermidis; the other is without growth at 24 hours   Likely contaminant  Hemodynamically stable without fever or leukocytosis   No need for additional antibiotic

## 2023-11-15 NOTE — ASSESSMENT & PLAN NOTE
Blood pressure is significantly elevated at 202/91 upon presentation, likely releated to pain, now improved   Continue home metoprolol tartrate 25 mg oral twice daily

## 2023-11-15 NOTE — DISCHARGE SUMMARY
[address] [facility]  Discharge- Nikos Hernandez 1950, 67 y.o. female MRN: [id number]  Unit/Bed#: -Ceci Encounter: [id number]  Primary Care Provider: Penelope Navarrete DO   Date and time admitted to hospital: 11/13/2023  6:49 PM    * Acute cholecystitis-resolved as of 11/15/2023  Assessment & Plan  Patient presented with abdominal pain.   Leukocytosis upon presentation, lactic acid is normal  CT abdomen pelvis showing evidence of acute cholecystitis  Right upper quadrant ultrasound positive for cholecystitis  MRCP showed acute calculus cholecystitis and very mild hepatic steatosis  Appreciate surgery consult and recommendations  Status post lap luann on 11/14   Maintained on IV Zosyn pre-operatively, now discontinued   Tolerating surgical soft diet today without pain, nausea or vomiting     Primary hypertension  Assessment & Plan  Blood pressure is significantly elevated at 202/91 upon presentation, likely releated to pain, now improved   Continue home metoprolol tartrate 25 mg oral twice daily    Positive blood culture  Assessment & Plan  1/2 admission blood cultures growing GPC in clusters, BCID shows staph epidermidis; the other is without growth at 24 hours   Likely contaminant  Hemodynamically stable without fever or leukocytosis   No need for additional antibiotic     Transaminitis  Assessment & Plan  In the setting of acute cholecystitis  Down-trending status post cystectomy     Parkinson's disease  Assessment & Plan  Continue Sinemet  Nuplazid is non-formulary     Gastro-esophageal reflux disease without esophagitis  Assessment & Plan  Protonix 40 mg daily    Acquired hypothyroidism  Assessment & Plan  Continue home levothyroxine        Medical Problems       Resolved Problems  Date Reviewed: 11/15/2023            Resolved    * (Principal) Acute cholecystitis 11/15/2023     Resolved by  Nila Johnson PA-C        Discharging Physician / Practitioner: Nila Johnson PA-C  PCP: Angela Decker DO  Admission Date:   Admission Orders (From admission, onward)       Ordered        11/13/23 2141  INPATIENT ADMISSION  Once                          Discharge Date: 11/15/23    Consultations During Hospital Stay:  General surgery     Procedures Performed:   Laparoscopic cholecystectomy     Significant Findings / Test Results:   Outlined above     Incidental Findings:   None      Test Results Pending at Discharge (will require follow up): None      Outpatient Tests Requested:  None     Complications:  none     Reason for Admission: acute cholecystitis     Hospital Course:   Adriane Luna is a 67 y.o. female patient who originally presented to the hospital on 11/13/2023 due to one day history of abdominal pain. CT scan showed evidence of acute cholecystitis, which was confirmed with US and MRCP. She was seen in consultation by general surgery and underwent laparoscopic cholecystectomy without complication. She is tolerating surgical soft diet without pain nausea or vomiting. She has been cleared for discharge and will follow-up with surgery in the outpatient setting. No changes to PTA medication regimen were made during this hospitalization. Please see above list of diagnoses and related plan for additional information. Condition at Discharge: good    Discharge Day Visit / Exam:   Subjective:  patient reports feeling well, denies pain nausea or vomiting. [facility] for discharge. Discussed with RN, patient ate her entire lunch without issues. Vitals: Blood Pressure: 152/75 (11/15/23 0726)  Pulse: 71 (11/15/23 0726)  Temperature: 97.5 °F (36.4 °C) (11/15/23 0726)  Temp Source: Temporal (11/14/23 1656)  Respirations: 18 (11/15/23 0726)  Height: 5' 2" (157.5 cm) (11/14/23 1656)  Weight - Scale: 93 kg (205 lb) (11/14/23 1656)  SpO2: 94 % (11/15/23 0726)  Exam:   Physical Exam  Vitals and nursing note reviewed. Constitutional:       General: She is not in acute distress. Appearance: She is obese. Cardiovascular:      Rate and Rhythm: Normal rate. Pulmonary:      Effort: Pulmonary effort is normal. No respiratory distress. Neurological:      General: No focal deficit present. Mental Status: She is alert and oriented to person, place, and time. Mental status is at baseline. Discussion with Family: Patient declined call to . Discharge instructions/Information to patient and family:   See after visit summary for information provided to patient and family. Provisions for Follow-Up Care:  See after visit summary for information related to follow-up care and any pertinent home health orders. Mobility at time of Discharge:   Basic Mobility Inpatient Raw Score: 14  JH-HLM Goal: 4: Move to chair/commode  JH-HLM Achieved: 4: Move to chair/commode  HLM Goal achieved. Continue to encourage appropriate mobility. Disposition:   Home    Planned Readmission: no     Discharge Statement:  I spent 25 minutes discharging the patient. This time was spent on the day of discharge. I had direct contact with the patient on the day of discharge. Greater than 50% of the total time was spent examining patient, answering all patient questions, arranging and discussing plan of care with patient as well as directly providing post-discharge instructions. Additional time then spent on discharge activities. Discharge Medications:  See after visit summary for reconciled discharge medications provided to patient and/or family.       **Please Note: This note may have been constructed using a voice recognition system**

## 2023-11-15 NOTE — CASE MANAGEMENT
Case Management Discharge Planning Note    Patient name Corrinne Meyer  Location /-79 MRN [id number]  : 1950 Date 11/15/2023       Current Admission Date: 2023  Current Admission Diagnosis:Acquired hypothyroidism   Patient Active Problem List    Diagnosis Date Noted    Positive blood culture 11/15/2023    Transaminitis 11/15/2023    Morbid obesity due to excess calories ([address]) 2022    Frequent urination 2022    Fall down steps 10/30/2022    Closed fracture dislocation of left ankle 10/30/2022    Parkinson's disease 10/30/2022    Primary hypertension 10/30/2022    Gastro-esophageal reflux disease without esophagitis 01/10/2022    Chronic obstructive pulmonary disease ([address]) 2021    Acquired hypothyroidism 2015      LOS (days): 2  Geometric Mean LOS (GMLOS) (days): 3.40  Days to GMLOS:1.7     OBJECTIVE:  Risk of Unplanned Readmission Score: 11.15         Current admission status: Inpatient   Preferred Pharmacy:   [facility] [address] KAYLA ANDERSON [address] [place] [address]  Phone: [phone] Fax: [address], [address] [address]  Phone: [phone] Fax: [phone]    Primary Care Provider: Skyler Cohen DO    Primary Insurance: Troy Gil [facility] REP  Secondary Insurance:     DISCHARGE DETAILS:pt discharging home today. No discharge needs noted. Nurse to review AVS, all follow up providers listed.

## 2023-11-15 NOTE — PROGRESS NOTES
Progress Note - General Surgery   Vineet Wolfe 67 y.o. female MRN: [id number]  Unit/Bed#: -01 Encounter: [id number]    Assessment:  70-year-old female postop day 1 status post laparoscopic cholecystectomy    - Intra-Op findings: Acute calculus cholecystitis with edema of the gallbladder wall. A pulsating vessel was present within the hepatic fossa. A moderate amount of bleeding was encountered. This was able to be controlled with cautery and Surgicel. - Afebrile, vital signs stable on room air with episodes of hypertension  - WBC 8 (6, 11)  - Hgb 11 (10, 12)  - BMP WNL  - LFTs elevated but generally downtrending -  (431, 146),  (114, 40), alk phos 157 (142, 138)  - T. bili 1.14 (1.43, 1.28)  - 1/2 blood cultures from admission with GPC in clusters - identification as staph epidermidis - likely contaminant  - PMHx -HLD, hypothyroidism, Parkinson's disease    Plan:  - Patient received first tray of clear liquids this morning, will check it back after breakfast and advance to surgical soft diet if clears or tolerated  - Pending diet tolerance patient will be stable for discharge from surgical standpoint  - Discharge information has been reviewed to include showering, lifting restriction  - Office will be in contact with patient regarding follow-up in about 2 weeks  - Co-morbidities including blood culture management per primary     Subjective: Patient states overall she is feeling well today. She denies significant abdominal pain, nausea, or vomiting. Patient was able to drink some water overnight, with clear liquids at bedside. Patient does feel that she has an appetite. She denies any fevers or chills, shortness of breath or chest tightness. She is hopeful to return home today. Objective:   Blood pressure 152/75, pulse 71, temperature 97.5 °F (36.4 °C), resp. rate 18, height 5' 2" (1.575 m), weight 93 kg (205 lb), SpO2 94 %. ,Body mass index is 37.49 kg/m².     Intake/Output Summary (Last 24 hours) at 11/15/2023 1231  Last data filed at 11/15/2023 9441  Gross per 24 hour   Intake 990 ml   Output 1000 ml   Net -10 ml     Invasive Devices       Peripheral Intravenous Line  Duration             Peripheral IV 11/13/23 Left Antecubital 1 day              Drain  Duration             External Urinary Catheter 379 days    External Urinary Catheter <1 day                  Physical Exam:   General: no acute distress, pt appears well and comfortable, initially awake in bed and when checked back on in chair   Skin: warm and dry to touch  Pulmonary: normal effort  Abdominal: soft, non-distended, non-tender abdomen without guarding or rebound, for incision sites are clean/dry/intact with glue in place, no drainage or bleeding appreciated  Neuro: alert and oriented     Lab, Imaging and other studies:I have personally reviewed pertinent lab results.   , CBC:   Lab Results   Component Value Date    WBC 8.72 11/15/2023    HGB 11.7 11/15/2023    HCT 37.4 11/15/2023    MCV 95 11/15/2023     11/15/2023    RBC 3.92 11/15/2023    MCH 29.8 11/15/2023    MCHC 31.3 (L) 11/15/2023    RDW 12.2 11/15/2023    MPV 10.3 11/15/2023    NRBC 0 11/15/2023   , CMP:   Lab Results   Component Value Date    SODIUM 138 11/15/2023    K 4.1 11/15/2023     11/15/2023    CO2 25 11/15/2023    BUN 14 11/15/2023    CREATININE 0.75 11/15/2023    CALCIUM 8.6 11/15/2023     (H) 11/15/2023     (H) 11/15/2023    ALKPHOS 157 (H) 11/15/2023    EGFR 79 11/15/2023     VTE Pharmacologic Prophylaxis: Heparin  VTE Mechanical Prophylaxis: sequential compression device    Melly Schulte PA-C  11/15/2023

## 2023-11-15 NOTE — ASSESSMENT & PLAN NOTE
Patient presented with abdominal pain.   Leukocytosis upon presentation, lactic acid is normal  CT abdomen pelvis showing evidence of acute cholecystitis  Right upper quadrant ultrasound positive for cholecystitis  MRCP showed acute calculus cholecystitis and very mild hepatic steatosis  Appreciate surgery consult and recommendations  Status post lap luann on 11/14   Maintained on IV Zosyn pre-operatively, now discontinued   Tolerating surgical soft diet today without pain, nausea or vomiting

## 2023-11-15 NOTE — DISCHARGE INSTR - AVS FIRST PAGE
Laparoscopic Cholecystectomy   WHAT YOU NEED TO KNOW:   Laparoscopic cholecystectomy is surgery to remove your gallbladder. DISCHARGE INSTRUCTIONS:   Call Dr. Johann Abel office at [phone] with any questions or concerns    Medicines: You may need any of the following:  Prescription pain medicine - Take as directed    You may also take tylenol as needed    Take your medicine as directed. Contact your healthcare provider if you think your medicine is not helping or if you have side effects. Tell her if you are allergic to any medicine. Keep a list of the medicines, vitamins, and herbs you take. Include the amounts, and when and why you take them. Bring the list or the pill bottles to follow-up visits. Carry your medicine list with you in case of an emergency. Follow up with your healthcare provider 2 weeks after surgery, or as directed:  Write down your questions so you remember to ask them during your visits. Wound care: You may shower and pat the incisions dry. Do not soak underwater for 2 weeks   What to eat after surgery: You may eat whatever you feel up to following surgery. You may notice diarrhea with fatty foods. Drink plenty of liquids. When to return to work and other activities:  No lifting, pushing, or pulling greater then 10lb for 2 weeks. Walk as much as possible. YOU may drive when you are comfortable enough to turn and press the brake without pain medication    Contact your healthcare provider if:   You have a fever over 101°F (38°C) or chills. You have pain or nausea that is not relieved by medicine. You have redness and swelling around your incisions, or blood or pus is leaking from your incisions. You are constipated or have diarrhea. Your skin or eyes are yellow, or your bowel movements are pale. You have questions or concerns about your surgery, condition, or care. Seek care immediately or call 911 if:   You cannot stop vomiting.      Your bowel movements are black or bloody. You have pain in your abdomen and it is swollen or hard. Your arm or leg feels warm, tender, and painful. It may look swollen and red. You feel lightheaded, short of breath, and have chest pain. You cough up blood. © 2017 5 [facility] [employer] Information is for End User's use only and may not be sold, redistributed or otherwise used for commercial purposes. All illustrations and images included in CareNotes® are the copyrighted property of [employer] Inc[employer] or Isidro Montano. The above information is an  only. It is not intended as medical advice for individual conditions or treatments. Talk to your doctor, nurse or pharmacist before following any medical regimen to see if it is safe and effective for you.

## 2023-11-15 NOTE — NURSING NOTE
Discharge instructions reviewed with patient. Patient verbalized understanding of same and agrees to follow up with outpatient appointments.

## 2023-11-15 NOTE — NURSING NOTE
Gold earrings and necklace with cross in orange container at bedside, gold wedding ring placed back on patients left hand ring finger

## 2023-11-15 NOTE — PLAN OF CARE
Problem: PAIN - ADULT  Goal: Verbalizes/displays adequate comfort level or baseline comfort level  Description: Interventions:  - Encourage patient to monitor pain and request assistance  - Assess pain using appropriate pain scale0-10  - Administer analgesics based on type and severity of pain and evaluate response  - Implement non-pharmacological measures as appropriate and evaluate response  - Consider cultural and social influences on pain and pain management  - Notify physician/advanced practitioner if interventions unsuccessful or patient reports new pain  Outcome: Progressing     Problem: INFECTION - ADULT  Goal: Absence or prevention of progression during hospitalization  Description: INTERVENTIONS:  - Assess and monitor for signs and symptoms of infection  - Monitor lab/diagnostic results  - Monitor all insertion sites, i.e. indwelling lines, tubes, and drains  - Monitor endotracheal if appropriate and nasal secretions for changes in amount and color  - [place] appropriate cooling/warming therapies per order  - Administer medications as ordered  - Instruct and encourage patient and family to use good hand hygiene technique  - Identify and instruct in appropriate isolation precautions for identified infection/condition  Outcome: Progressing  Goal: Absence of fever/infection during neutropenic period  Description: INTERVENTIONS:  - Monitor WBC    Outcome: Progressing     Problem: SAFETY ADULT  Goal: Patient will remain free of falls  Description: INTERVENTIONS:  - Educate patient/family on patient safety including physical limitations  - Instruct patient to call for assistance with activity   - Consult OT/PT to assist with strengthening/mobility   - Keep Call bell within reach  - Keep bed low and locked with side rails adjusted as appropriate  - Keep care items and personal belongings within reach  - Initiate and maintain comfort rounds  - Make Fall Risk Sign visible to staff  - Offer Toileting every 3 Hours, in advance of need    - Obtain necessary fall risk management equipment: walker  - Apply yellow socks and bracelet for high fall risk patients  - Consider moving patient to room near nurses station  Outcome: Progressing  Goal: Maintain or return to baseline ADL function  Description: INTERVENTIONS:  -  Assess patient's ability to carry out ADLs; assess patient's baseline for ADL function and identify physical deficits which impact ability to perform ADLs (bathing, care of mouth/teeth, toileting, grooming, dressing, etc.)  - Assess/evaluate cause of self-care deficits   - Assess range of motion  - Assess patient's mobility; develop plan if impaired  - Assess patient's need for assistive devices and provide as appropriate  - Encourage maximum independence but intervene and supervise when necessary  - Involve family in performance of ADLs  - Assess for home care needs following discharge   - Consider OT consult to assist with ADL evaluation and planning for discharge  - Provide patient education as appropriate  Outcome: Progressing  Goal: Maintains/Returns to pre admission functional level  Description: INTERVENTIONS:  - Perform BMAT or MOVE assessment daily.   - Set and communicate daily mobility goal to care team and patient/family/caregiver. - Collaborate with rehabilitation services on mobility goals if consulted  - Perform Range of Motion 3 times a day. - Reposition patient every 3 hours.   - Dangle patient 3 times a day  - Stand patient 3 times a day  - Ambulate patient 3 times a day  - Out of bed to chair 3 times a day   - Out of bed for meals 3 times a day  - Out of bed for toileting  - Record patient progress and toleration of activity level   Outcome: Progressing     Problem: DISCHARGE PLANNING  Goal: Discharge to home or other facility with appropriate resources  Description: INTERVENTIONS:  - Identify barriers to discharge w/patient and caregiver  - Arrange for needed discharge resources and transportation as appropriate  - Identify discharge learning needs (meds, wound care, etc.)  - Arrange for interpretive services to assist at discharge as needed  - Refer to Case Management Department for coordinating discharge planning if the patient needs post-hospital services based on physician/advanced practitioner order or complex needs related to functional status, cognitive ability, or social support system  Outcome: Progressing     Problem: MOBILITY - ADULT  Goal: Maintain or return to baseline ADL function  Description: INTERVENTIONS:  -  Assess patient's ability to carry out ADLs; assess patient's baseline for ADL function and identify physical deficits which impact ability to perform ADLs (bathing, care of mouth/teeth, toileting, grooming, dressing, etc.)  - Assess/evaluate cause of self-care deficits   - Assess range of motion  - Assess patient's mobility; develop plan if impaired  - Assess patient's need for assistive devices and provide as appropriate  - Encourage maximum independence but intervene and supervise when necessary  - Involve family in performance of ADLs  - Assess for home care needs following discharge   - Consider OT consult to assist with ADL evaluation and planning for discharge  - Provide patient education as appropriate  Outcome: Progressing  Goal: Maintains/Returns to pre admission functional level  Description: INTERVENTIONS:  - Perform BMAT or MOVE assessment daily.   - Set and communicate daily mobility goal to care team and patient/family/caregiver. - Collaborate with rehabilitation services on mobility goals if consulted  - Perform Range of Motion 3 times a day. - Reposition patient every 3 hours.   - Dangle patient 3 times a day  - Stand patient 3 times a day  - Ambulate patient 3 times a day  - Out of bed to chair 3 times a day   - Out of bed for meals 3 times a day  - Out of bed for toileting  - Record patient progress and toleration of activity level   Outcome: Progressing     Problem: Knowledge Deficit  Goal: Patient/family/caregiver demonstrates understanding of disease process, treatment plan, medications, and discharge instructions  Description: Complete learning assessment and assess knowledge base.   Interventions:  - Provide teaching at level of understanding  - Provide teaching via preferred learning methods  Outcome: Progressing

## 2023-11-16 NOTE — UTILIZATION REVIEW
NOTIFICATION OF ADMISSION DISCHARGE   This is a Notification of Discharge from [address] [facility]. Please be advised that this patient has been discharge from our facility. Below you will find the admission and discharge date and time including the patient’s disposition. UTILIZATION REVIEW CONTACT:  Demetrice Walters  Utilization   Network Utilization Review Department  Phone: [phone] x carefully listen to the prompts. All voicemails are confidential.  Email: [email or web address]     ADMISSION INFORMATION  PRESENTATION DATE: 11/13/2023  6:49 PM  OBERVATION ADMISSION DATE:   INPATIENT ADMISSION DATE: 11/13/23  9:45 PM   DISCHARGE DATE: 11/15/2023  3:00 PM   DISPOSITION:Home/Self Care    Network Utilization Review Department  ATTENTION: Please call with any questions or concerns to [phone] and carefully listen to the prompts so that you are directed to the right person. All voicemails are confidential.   For Discharge needs, contact Care Management DC Support Team at [phone] opt. 2  Send all requests for admission clinical reviews, approved or denied determinations and any other requests to dedicated fax number below belonging to the campus where the patient is receiving treatment.  List of dedicated fax numbers for the Facilities:  [place] DENIALS (Administrative/Medical Necessity) [phone]   DISCHARGE SUPPORT TEAM (Network) [phone] [address] [facility] (Maternity/NICU/Pediatrics) [phone] [address] [facility] [address] [place] [address] [address]  [facility] [phone]

## 2023-11-17 LAB
BACTERIA BLD CULT: ABNORMAL
GRAM STN SPEC: ABNORMAL
MECA+MECC ISLT/SPM QL: DETECTED
S EPIDERMIDIS DNA BLD POS QL NAA+NON-PRB: DETECTED

## 2023-11-17 PROCEDURE — 88304 TISSUE EXAM BY PATHOLOGIST: CPT | Performed by: PATHOLOGY

## 2023-11-19 LAB — BACTERIA BLD CULT: NORMAL

## 2025-01-02 ENCOUNTER — HOSPITAL ENCOUNTER (INPATIENT)
Facility: HOSPITAL | Age: 75
LOS: 2 days | Discharge: HOME HEALTH | DRG: 689 | End: 2025-01-05
Attending: EMERGENCY MEDICINE | Admitting: STUDENT IN AN ORGANIZED HEALTH CARE EDUCATION/TRAINING PROGRAM
Payer: COMMERCIAL

## 2025-01-02 ENCOUNTER — APPOINTMENT (EMERGENCY)
Dept: CT IMAGING | Facility: HOSPITAL | Age: 75
DRG: 689 | End: 2025-01-02
Payer: COMMERCIAL

## 2025-01-02 DIAGNOSIS — R53.1 GENERALIZED WEAKNESS: ICD-10-CM

## 2025-01-02 DIAGNOSIS — N39.0 UTI (URINARY TRACT INFECTION): Primary | ICD-10-CM

## 2025-01-02 DIAGNOSIS — G20.A1 PARKINSON'S DISEASE, UNSPECIFIED WHETHER DYSKINESIA PRESENT, UNSPECIFIED WHETHER MANIFESTATIONS FLUCTUATE (HCC): ICD-10-CM

## 2025-01-02 DIAGNOSIS — J18.9 PNEUMONIA: ICD-10-CM

## 2025-01-02 LAB
ALBUMIN SERPL BCG-MCNC: 4.1 G/DL (ref 3.5–5)
ALP SERPL-CCNC: 84 U/L (ref 34–104)
ALT SERPL W P-5'-P-CCNC: 20 U/L (ref 7–52)
ANION GAP SERPL CALCULATED.3IONS-SCNC: 6 MMOL/L (ref 4–13)
AST SERPL W P-5'-P-CCNC: 18 U/L (ref 13–39)
ATRIAL RATE: 71 BPM
BACTERIA UR QL AUTO: ABNORMAL /HPF
BASOPHILS # BLD AUTO: 0.03 THOUSANDS/ΜL (ref 0–0.1)
BASOPHILS NFR BLD AUTO: 0 % (ref 0–1)
BILIRUB SERPL-MCNC: 0.61 MG/DL (ref 0.2–1)
BILIRUB UR QL STRIP: NEGATIVE
BUN SERPL-MCNC: 21 MG/DL (ref 5–25)
CALCIUM SERPL-MCNC: 8.3 MG/DL (ref 8.4–10.2)
CHLORIDE SERPL-SCNC: 103 MMOL/L (ref 96–108)
CLARITY UR: CLEAR
CO2 SERPL-SCNC: 28 MMOL/L (ref 21–32)
COLOR UR: YELLOW
CREAT SERPL-MCNC: 0.72 MG/DL (ref 0.6–1.3)
EOSINOPHIL # BLD AUTO: 0.14 THOUSAND/ΜL (ref 0–0.61)
EOSINOPHIL NFR BLD AUTO: 1 % (ref 0–6)
ERYTHROCYTE [DISTWIDTH] IN BLOOD BY AUTOMATED COUNT: 13 % (ref 11.6–15.1)
FLUAV RNA RESP QL NAA+PROBE: NEGATIVE
FLUBV RNA RESP QL NAA+PROBE: NEGATIVE
GFR SERPL CREATININE-BSD FRML MDRD: 82 ML/MIN/1.73SQ M
GLUCOSE SERPL-MCNC: 118 MG/DL (ref 65–140)
GLUCOSE UR STRIP-MCNC: NEGATIVE MG/DL
HCT VFR BLD AUTO: 35.5 % (ref 34.8–46.1)
HGB BLD-MCNC: 11.2 G/DL (ref 11.5–15.4)
HGB UR QL STRIP.AUTO: ABNORMAL
IMM GRANULOCYTES # BLD AUTO: 0.08 THOUSAND/UL (ref 0–0.2)
IMM GRANULOCYTES NFR BLD AUTO: 1 % (ref 0–2)
KETONES UR STRIP-MCNC: NEGATIVE MG/DL
LACTATE SERPL-SCNC: 0.9 MMOL/L (ref 0.5–2)
LEUKOCYTE ESTERASE UR QL STRIP: ABNORMAL
LIPASE SERPL-CCNC: 12 U/L (ref 11–82)
LYMPHOCYTES # BLD AUTO: 0.85 THOUSANDS/ΜL (ref 0.6–4.47)
LYMPHOCYTES NFR BLD AUTO: 5 % (ref 14–44)
MCH RBC QN AUTO: 28.6 PG (ref 26.8–34.3)
MCHC RBC AUTO-ENTMCNC: 31.5 G/DL (ref 31.4–37.4)
MCV RBC AUTO: 91 FL (ref 82–98)
MONOCYTES # BLD AUTO: 1.04 THOUSAND/ΜL (ref 0.17–1.22)
MONOCYTES NFR BLD AUTO: 6 % (ref 4–12)
NEUTROPHILS # BLD AUTO: 14.41 THOUSANDS/ΜL (ref 1.85–7.62)
NEUTS SEG NFR BLD AUTO: 87 % (ref 43–75)
NITRITE UR QL STRIP: NEGATIVE
NON-SQ EPI CELLS URNS QL MICRO: ABNORMAL /HPF
NRBC BLD AUTO-RTO: 0 /100 WBCS
P AXIS: 36 DEGREES
PH UR STRIP.AUTO: 6.5 [PH]
PLATELET # BLD AUTO: 258 THOUSANDS/UL (ref 149–390)
PMV BLD AUTO: 10.2 FL (ref 8.9–12.7)
POTASSIUM SERPL-SCNC: 4.1 MMOL/L (ref 3.5–5.3)
PR INTERVAL: 128 MS
PROCALCITONIN SERPL-MCNC: 0.08 NG/ML
PROT SERPL-MCNC: 6.7 G/DL (ref 6.4–8.4)
PROT UR STRIP-MCNC: NEGATIVE MG/DL
QRS AXIS: -38 DEGREES
QRSD INTERVAL: 74 MS
QT INTERVAL: 352 MS
QTC INTERVAL: 382 MS
RBC # BLD AUTO: 3.92 MILLION/UL (ref 3.81–5.12)
RBC #/AREA URNS AUTO: ABNORMAL /HPF
RSV RNA RESP QL NAA+PROBE: NEGATIVE
SARS-COV-2 RNA RESP QL NAA+PROBE: NEGATIVE
SODIUM SERPL-SCNC: 137 MMOL/L (ref 135–147)
SP GR UR STRIP.AUTO: 1.02 (ref 1–1.03)
T WAVE AXIS: 98 DEGREES
UROBILINOGEN UR QL STRIP.AUTO: 0.2 E.U./DL
VENTRICULAR RATE: 71 BPM
WBC # BLD AUTO: 16.55 THOUSAND/UL (ref 4.31–10.16)
WBC #/AREA URNS AUTO: ABNORMAL /HPF

## 2025-01-02 PROCEDURE — 80053 COMPREHEN METABOLIC PANEL: CPT | Performed by: EMERGENCY MEDICINE

## 2025-01-02 PROCEDURE — 99285 EMERGENCY DEPT VISIT HI MDM: CPT

## 2025-01-02 PROCEDURE — 99223 1ST HOSP IP/OBS HIGH 75: CPT | Performed by: NURSE PRACTITIONER

## 2025-01-02 PROCEDURE — 87186 SC STD MICRODIL/AGAR DIL: CPT | Performed by: EMERGENCY MEDICINE

## 2025-01-02 PROCEDURE — 96365 THER/PROPH/DIAG IV INF INIT: CPT

## 2025-01-02 PROCEDURE — 87086 URINE CULTURE/COLONY COUNT: CPT | Performed by: EMERGENCY MEDICINE

## 2025-01-02 PROCEDURE — 83690 ASSAY OF LIPASE: CPT | Performed by: EMERGENCY MEDICINE

## 2025-01-02 PROCEDURE — 85025 COMPLETE CBC W/AUTO DIFF WBC: CPT | Performed by: EMERGENCY MEDICINE

## 2025-01-02 PROCEDURE — 83605 ASSAY OF LACTIC ACID: CPT | Performed by: NURSE PRACTITIONER

## 2025-01-02 PROCEDURE — 74177 CT ABD & PELVIS W/CONTRAST: CPT

## 2025-01-02 PROCEDURE — 93010 ELECTROCARDIOGRAM REPORT: CPT | Performed by: INTERNAL MEDICINE

## 2025-01-02 PROCEDURE — 81001 URINALYSIS AUTO W/SCOPE: CPT | Performed by: EMERGENCY MEDICINE

## 2025-01-02 PROCEDURE — 36415 COLL VENOUS BLD VENIPUNCTURE: CPT | Performed by: EMERGENCY MEDICINE

## 2025-01-02 PROCEDURE — 96361 HYDRATE IV INFUSION ADD-ON: CPT

## 2025-01-02 PROCEDURE — 93005 ELECTROCARDIOGRAM TRACING: CPT

## 2025-01-02 PROCEDURE — 99285 EMERGENCY DEPT VISIT HI MDM: CPT | Performed by: EMERGENCY MEDICINE

## 2025-01-02 PROCEDURE — 0241U HB NFCT DS VIR RESP RNA 4 TRGT: CPT | Performed by: EMERGENCY MEDICINE

## 2025-01-02 PROCEDURE — 84145 PROCALCITONIN (PCT): CPT | Performed by: NURSE PRACTITIONER

## 2025-01-02 PROCEDURE — 87077 CULTURE AEROBIC IDENTIFY: CPT | Performed by: EMERGENCY MEDICINE

## 2025-01-02 RX ORDER — PRAMIPEXOLE DIHYDROCHLORIDE 0.25 MG/1
0.12 TABLET ORAL 3 TIMES DAILY
Status: DISCONTINUED | OUTPATIENT
Start: 2025-01-02 | End: 2025-01-05 | Stop reason: HOSPADM

## 2025-01-02 RX ORDER — METOPROLOL TARTRATE 25 MG/1
25 TABLET, FILM COATED ORAL DAILY
Status: DISCONTINUED | OUTPATIENT
Start: 2025-01-03 | End: 2025-01-05 | Stop reason: HOSPADM

## 2025-01-02 RX ORDER — CARBIDOPA AND LEVODOPA 25; 100 MG/1; MG/1
1 TABLET ORAL 4 TIMES DAILY
Status: DISCONTINUED | OUTPATIENT
Start: 2025-01-02 | End: 2025-01-05 | Stop reason: HOSPADM

## 2025-01-02 RX ORDER — DOXYCYCLINE 100 MG/1
100 CAPSULE ORAL ONCE
Status: DISCONTINUED | OUTPATIENT
Start: 2025-01-02 | End: 2025-01-02

## 2025-01-02 RX ORDER — CEFTRIAXONE 1 G/50ML
1000 INJECTION, SOLUTION INTRAVENOUS ONCE
Status: COMPLETED | OUTPATIENT
Start: 2025-01-02 | End: 2025-01-02

## 2025-01-02 RX ORDER — PRAMIPEXOLE DIHYDROCHLORIDE 0.12 MG/1
0.12 TABLET ORAL 3 TIMES DAILY
COMMUNITY
Start: 2024-12-05

## 2025-01-02 RX ORDER — ACETAMINOPHEN 325 MG/1
650 TABLET ORAL EVERY 6 HOURS PRN
Status: DISCONTINUED | OUTPATIENT
Start: 2025-01-02 | End: 2025-01-05 | Stop reason: HOSPADM

## 2025-01-02 RX ORDER — CEFTRIAXONE 1 G/50ML
1000 INJECTION, SOLUTION INTRAVENOUS EVERY 24 HOURS
Status: DISCONTINUED | OUTPATIENT
Start: 2025-01-03 | End: 2025-01-05 | Stop reason: HOSPADM

## 2025-01-02 RX ORDER — ASPIRIN 81 MG/1
81 TABLET, CHEWABLE ORAL DAILY
Status: DISCONTINUED | OUTPATIENT
Start: 2025-01-03 | End: 2025-01-05 | Stop reason: HOSPADM

## 2025-01-02 RX ORDER — EZETIMIBE 10 MG/1
10 TABLET ORAL
Status: DISCONTINUED | OUTPATIENT
Start: 2025-01-02 | End: 2025-01-05 | Stop reason: HOSPADM

## 2025-01-02 RX ORDER — PANTOPRAZOLE SODIUM 20 MG/1
20 TABLET, DELAYED RELEASE ORAL DAILY
Status: DISCONTINUED | OUTPATIENT
Start: 2025-01-03 | End: 2025-01-05 | Stop reason: HOSPADM

## 2025-01-02 RX ORDER — LEVOTHYROXINE SODIUM 50 UG/1
50 TABLET ORAL
Status: DISCONTINUED | OUTPATIENT
Start: 2025-01-03 | End: 2025-01-05 | Stop reason: HOSPADM

## 2025-01-02 RX ORDER — DOXYCYCLINE 100 MG/1
100 CAPSULE ORAL 2 TIMES DAILY
Qty: 14 CAPSULE | Refills: 0 | Status: SHIPPED | OUTPATIENT
Start: 2025-01-02 | End: 2025-01-05

## 2025-01-02 RX ORDER — ENOXAPARIN SODIUM 100 MG/ML
40 INJECTION SUBCUTANEOUS DAILY
Status: DISCONTINUED | OUTPATIENT
Start: 2025-01-03 | End: 2025-01-05 | Stop reason: HOSPADM

## 2025-01-02 RX ADMIN — SODIUM CHLORIDE 1000 ML: 0.9 INJECTION, SOLUTION INTRAVENOUS at 14:54

## 2025-01-02 RX ADMIN — DOXYCYCLINE 100 MG: 100 CAPSULE ORAL at 17:41

## 2025-01-02 RX ADMIN — EZETIMIBE 10 MG: 10 TABLET ORAL at 21:12

## 2025-01-02 RX ADMIN — CARBIDOPA AND LEVODOPA 1 TABLET: 25; 100 TABLET ORAL at 21:13

## 2025-01-02 RX ADMIN — CEFTRIAXONE 1000 MG: 1 INJECTION, SOLUTION INTRAVENOUS at 15:35

## 2025-01-02 RX ADMIN — PRAMIPEXOLE DIHYDROCHLORIDE 0.12 MG: 0.25 TABLET ORAL at 21:13

## 2025-01-02 RX ADMIN — IOHEXOL 100 ML: 350 INJECTION, SOLUTION INTRAVENOUS at 15:35

## 2025-01-02 NOTE — ED PROVIDER NOTES
Time reflects when diagnosis was documented in both MDM as applicable and the Disposition within this note       Time User Action Codes Description Comment    1/2/2025  5:40 PM Judith Petty Add [N39.0] UTI (urinary tract infection)     1/2/2025  5:40 PM Judith Petty Add [J18.9] Pneumonia           ED Disposition       ED Disposition   Admit    Condition   Stable    Date/Time   Thu Jan 2, 2025  6:38 PM    Comment   --             Assessment & Plan       Medical Decision Making  UTI, sepsis, pyelonephritis    Amount and/or Complexity of Data Reviewed  Labs: ordered.  Radiology: ordered.    Risk  Prescription drug management.  Decision regarding hospitalization.        ED Course as of 01/02/25 1850   Thu Jan 02, 2025   1800 I discussed with the patient and her family the results of the CT and labs.  Patient states that she wanted to go home however when nursing staff attempted to ambulate the patient she was weak unable to stand to transfer on her own.       Medications   sodium chloride 0.9 % bolus 1,000 mL (0 mL Intravenous Stopped 1/2/25 1627)   cefTRIAXone (ROCEPHIN) IVPB (premix in dextrose) 1,000 mg 50 mL (0 mg Intravenous Stopped 1/2/25 1627)   iohexol (OMNIPAQUE) 350 MG/ML injection (MULTI-DOSE) 100 mL (100 mL Intravenous Given 1/2/25 1535)       ED Risk Strat Scores                                              History of Present Illness       Chief Complaint   Patient presents with    Possible UTI     Pt arrives via EMS from home; hx of utis, has had dark, foul smelling urine and generalized weakness for a few days       Past Medical History:   Diagnosis Date    Coronary artery disease     HLD (hyperlipidemia)     Hypothyroid     Parkinson disease (HCC)       Past Surgical History:   Procedure Laterality Date    CHOLECYSTECTOMY LAPAROSCOPIC N/A 11/14/2023    Procedure: CHOLECYSTECTOMY LAPAROSCOPIC;  Surgeon: Linda Gilman DO;  Location:  MAIN OR;  Service: General    ORIF TIBIA & FIBULA  FRACTURES Left 10/31/2022    Procedure: OPEN REDUCTION W/ INTERNAL FIXATION (ORIF) ANKLE;  Surgeon: Jason Juarez;  Location:  MAIN OR;  Service: Orthopedics      History reviewed. No pertinent family history.   Social History     Tobacco Use    Smoking status: Never    Smokeless tobacco: Never   Vaping Use    Vaping status: Never Used   Substance Use Topics    Alcohol use: Not Currently    Drug use: Never      E-Cigarette/Vaping    E-Cigarette Use Never User       E-Cigarette/Vaping Substances      I have reviewed and agree with the history as documented.     This is a 74-year-old female presenting to the ED for evaluation of UTI symptoms.  Patient states that she has had frequency with foul-smelling urine and generalized weakness for the last several days.  She denies any fever or chills.  She states that she is prone to UTIs in the last UTI she had was last year however she states that she has been having symptoms since last year.  She has not followed up with her PCP.  Her  states that he was unable to help her transfer because she has been so weak.        Review of Systems   Constitutional:  Positive for activity change and fatigue. Negative for chills and fever.   HENT:  Negative for ear pain and sore throat.    Eyes:  Negative for pain and visual disturbance.   Respiratory:  Negative for cough and shortness of breath.    Cardiovascular:  Negative for chest pain and palpitations.   Gastrointestinal:  Negative for abdominal pain and vomiting.   Genitourinary:  Positive for decreased urine volume, difficulty urinating, dysuria, frequency, hematuria and urgency.   Musculoskeletal:  Negative for arthralgias and back pain.   Skin:  Negative for color change and rash.   Neurological:  Positive for weakness. Negative for seizures and syncope.   All other systems reviewed and are negative.          Objective       ED Triage Vitals   Temperature Pulse Blood Pressure Respirations SpO2 Patient Position -  Orthostatic VS   01/02/25 1440 01/02/25 1436 01/02/25 1436 01/02/25 1436 01/02/25 1436 01/02/25 1436   100.4 °F (38 °C) 74 (!) 178/81 16 96 % Sitting      Temp Source Heart Rate Source BP Location FiO2 (%) Pain Score    01/02/25 1440 01/02/25 1436 01/02/25 1436 -- 01/02/25 1436    Temporal Monitor Right arm  No Pain      Vitals      Date and Time Temp Pulse SpO2 Resp BP Pain Score FACES Pain Rating User   01/02/25 1635 -- 70 99 % 20 170/69 -- -- KK   01/02/25 1500 -- 69 98 % 19 149/63 -- -- KK   01/02/25 1440 100.4 °F (38 °C) -- -- -- -- -- -- AS   01/02/25 1436 -- 74 96 % 16 178/81 No Pain -- AS            Physical Exam  Vitals and nursing note reviewed.   Constitutional:       General: She is not in acute distress.     Appearance: Normal appearance. She is well-developed. She is obese.      Comments: Lethargic     HENT:      Head: Normocephalic and atraumatic.      Right Ear: External ear normal.      Left Ear: External ear normal.      Mouth/Throat:      Mouth: Mucous membranes are moist.   Eyes:      Extraocular Movements: Extraocular movements intact.      Conjunctiva/sclera: Conjunctivae normal.   Cardiovascular:      Rate and Rhythm: Normal rate and regular rhythm.      Heart sounds: Normal heart sounds. No murmur heard.  Pulmonary:      Effort: Pulmonary effort is normal. No respiratory distress.      Breath sounds: Normal breath sounds.   Abdominal:      General: Abdomen is flat. Bowel sounds are normal. There is no distension.      Palpations: Abdomen is soft. There is no mass.      Tenderness: There is no abdominal tenderness. There is no guarding.   Musculoskeletal:         General: No swelling.      Cervical back: Neck supple.   Skin:     General: Skin is warm and dry.      Capillary Refill: Capillary refill takes less than 2 seconds.   Neurological:      General: No focal deficit present.      Mental Status: She is oriented to person, place, and time. Mental status is at baseline.   Psychiatric:          Mood and Affect: Mood normal.         Results Reviewed       Procedure Component Value Units Date/Time    COVID/FLU/RSV [[id number]]  (Normal) Collected: 01/02/25 1743    Lab Status: Final result Specimen: Nares from Nose Updated: 01/02/25 1828     SARS-CoV-2 Negative     INFLUENZA A PCR Negative     INFLUENZA B PCR Negative     RSV PCR Negative    Narrative:      This test has been performed using the CoV-2/Flu/RSV plus assay on the [employer] platform. This test has been validated by the  and verified by the performing laboratory.     This test is designed to amplify and detect the following: nucleocapsid (N), envelope (E), and RNA-dependent RNA polymerase (RdRP) genes of the SARS-CoV-2 genome; matrix (M), basic polymerase (PB2), and acidic protein (PA) segments of the influenza A genome; matrix (M) and non-structural protein (NS) segments of the influenza B genome, and the nucleocapsid genes of RSV A and RSV B.     Positive results are indicative of the presence of Flu A, Flu B, RSV, and/or SARS-CoV-2 RNA. Positive results for SARS-CoV-2 or suspected novel influenza should be reported to state, local, or federal health departments according to local reporting requirements.      All results should be assessed in conjunction with clinical presentation and other laboratory markers for clinical management.     FOR PEDIATRIC PATIENTS - copy/paste COVID Guidelines URL to browser: [email or web address]       CMP [[id number]]  (Abnormal) Collected: 01/02/25 1456    Lab Status: Final result Specimen: Blood from Arm, Left Updated: 01/02/25 1517     Sodium 137 mmol/L      Potassium 4.1 mmol/L      Chloride 103 mmol/L      CO2 28 mmol/L      ANION GAP 6 mmol/L      BUN 21 mg/dL      Creatinine 0.72 mg/dL      Glucose 118 mg/dL      Calcium 8.3 mg/dL      AST 18 U/L      ALT 20 U/L      Alkaline Phosphatase 84 U/L      Total Protein 6.7 g/dL      Albumin  4.1 g/dL      Total Bilirubin 0.61 mg/dL      eGFR 82 ml/min/1.73sq m     Narrative:      National Kid[employer] guidelines for Chronic Kidney Disease (CKD):     Stage 1 with normal or high GFR (GFR > 90 mL/min/1.73 square meters)    Stage 2 Mild CKD (GFR = 60-89 mL/min/1.73 square meters)    Stage 3A Moderate CKD (GFR = 45-59 mL/min/1.73 square meters)    Stage 3B Moderate CKD (GFR = 30-44 mL/min/1.73 square meters)    Stage 4 Severe CKD (GFR = 15-29 mL/min/1.73 square meters)    Stage 5 End Stage CKD (GFR <15 mL/min/1.73 square meters)  Note: GFR calculation is accurate only with a steady state creatinine    Lipase [[id number]]  (Normal) Collected: 01/02/25 1456    Lab Status: Final result Specimen: Blood from Arm, Left Updated: 01/02/25 1517     Lipase 12 u/L     Urine Microscopic [[id number]]  (Abnormal) Collected: 01/02/25 1456    Lab Status: Final result Specimen: Urine, Clean Catch Updated: 01/02/25 1512     RBC, UA 1-2 /hpf      WBC, UA 10-20 /hpf      Epithelial Cells Occasional /hpf      Bacteria, UA Innumerable /hpf     Urine culture [[id number]] Collected: 01/02/25 1456    Lab Status: In process Specimen: Urine, Clean Catch Updated: 01/02/25 1512    UA w Reflex to Microscopic w Reflex to Culture [[id number]]  (Abnormal) Collected: 01/02/25 1456    Lab Status: Final result Specimen: Urine, Clean Catch Updated: 01/02/25 1505     Color, UA Yellow     Clarity, UA Clear     Specific Gravity, UA 1.020     pH, UA 6.5     Leukocytes, UA Trace     Nitrite, UA Negative     Protein, UA Negative mg/dl      Glucose, UA Negative mg/dl      Ketones, UA Negative mg/dl      Urobilinogen, UA 0.2 E.U./dl      Bilirubin, UA Negative     Occult Blood, UA Small    CBC and differential [[id number]]  (Abnormal) Collected: 01/02/25 1456    Lab Status: Final result Specimen: Blood from Arm, Left Updated: 01/02/25 1503     WBC 16.55 Thousand/uL      RBC 3.92 Million/uL      Hemoglobin 11.2 g/dL      Hematocrit 35.5 %       MCV 91 fL      MCH 28.6 pg      MCHC 31.5 g/dL      RDW 13.0 %      MPV 10.2 fL      Platelets 258 Thousands/uL      nRBC 0 /100 WBCs      Segmented % 87 %      Immature Grans % 1 %      Lymphocytes % 5 %      Monocytes % 6 %      Eosinophils Relative 1 %      Basophils Relative 0 %      Absolute Neutrophils 14.41 Thousands/µL      Absolute Immature Grans 0.08 Thousand/uL      Absolute Lymphocytes 0.85 Thousands/µL      Absolute Monocytes 1.04 Thousand/µL      Eosinophils Absolute 0.14 Thousand/µL      Basophils Absolute 0.03 Thousands/µL             CT abdomen pelvis with contrast   Final Interpretation by Galdino Simon MD (01/02 1645)      Asymmetric airspace opacity at the right lung base is suspicious for developing pneumonia.      No evidence of hydronephrosis or obvious calculi on this contrast examination.      Slight haziness of the perivesicular fat could be related to urinary tract infection.      The study was marked in EPIC for immediate notification.         Workstation performed: [id number]             Procedures    ED Medication and Procedure Management   Prior to Admission Medications   Prescriptions Last Dose Informant Patient Reported? Taking?   Pimavanserin Tartrate (Nuplazid) 34 MG CAPS   Yes No   Sig: Take 34 mg by mouth daily at bedtime   Pimavanserin Tartrate (Nuplazid) 34 MG CAPS   Yes No   Sig: Take by mouth   aspirin 81 mg chewable tablet   Yes No   Sig: Chew 81 mg daily   carbidopa-levodopa (SINEMET)  mg per tablet   Yes No   Sig: Take 1 tablet by mouth Three times a day   cholecalciferol (VITAMIN D3) 1,000 units tablet   Yes No   Sig: Take 1,000 Units by mouth daily TAKES 2,000 UNITS   ezetimibe (ZETIA) 10 mg tablet   Yes No   Sig: Take by mouth   glycerin-hypromellose- (ARTIFICIAL TEARS) 0.2-0.2-1 % SOLN   No No   Sig: Administer 1 drop to both eyes every 4 (four) hours as needed (dry eye)   levothyroxine 50 mcg tablet   Yes No   Sig: TAKE 1 TABLET BY MOUTH DAILY AT  LEAST 30 MINUTES PRIOR TO FIRST MEAL OF THE DAY OR OTHER MEDICATIONS   metoprolol tartrate (LOPRESSOR) 25 mg tablet   Yes No   Sig: Take by mouth   omeprazole (PriLOSEC OTC) 20 MG tablet   Yes No   Sig: Take by mouth   oxybutynin (DITROPAN) 5 mg tablet   No No   Sig: Take 1 tablet (5 mg total) by mouth 2 (two) times a day   Patient not taking: Reported on 11/13/2023   simvastatin (ZOCOR) 10 mg tablet   Yes No   Sig: Take by mouth   Patient not taking: Reported on 11/13/2023      Facility-Administered Medications: None     Patient's Medications   Discharge Prescriptions    DOXYCYCLINE HYCLATE (VIBRAMYCIN) 100 MG CAPSULE    Take 1 capsule (100 mg total) by mouth 2 (two) times a day for 7 days       Start Date: 1/2/2025  End Date: 1/9/2025       Order Dose: 100 mg       Quantity: 14 capsule    Refills: 0     No discharge procedures on file.  ED SEPSIS DOCUMENTATION   Time reflects when diagnosis was documented in both MDM as applicable and the Disposition within this note       Time User Action Codes Description Comment    1/2/2025  5:40 PM Judith Petty [N39.0] UTI (urinary tract infection)     1/2/2025  5:40 PM Judith Petty [J18.9] Pneumonia                  Judith Petty DO  01/02/25 0104

## 2025-01-02 NOTE — ED NOTES
Failed ambulatory trial.      Amber Mcleod RN  01/02/25 0845      Patient's  noting he was having difficulty getting patient out of bed and the bedside commode at home. In ED patient had difficulty standing when attempting to get out of bed.     Amber Mcleod RN  01/02/25 9291

## 2025-01-03 ENCOUNTER — APPOINTMENT (OUTPATIENT)
Dept: RADIOLOGY | Facility: HOSPITAL | Age: 75
DRG: 689 | End: 2025-01-03
Payer: COMMERCIAL

## 2025-01-03 LAB
ANION GAP SERPL CALCULATED.3IONS-SCNC: 6 MMOL/L (ref 4–13)
BUN SERPL-MCNC: 17 MG/DL (ref 5–25)
CALCIUM SERPL-MCNC: 8 MG/DL (ref 8.4–10.2)
CHLORIDE SERPL-SCNC: 106 MMOL/L (ref 96–108)
CO2 SERPL-SCNC: 26 MMOL/L (ref 21–32)
CREAT SERPL-MCNC: 0.67 MG/DL (ref 0.6–1.3)
ERYTHROCYTE [DISTWIDTH] IN BLOOD BY AUTOMATED COUNT: 13.1 % (ref 11.6–15.1)
GFR SERPL CREATININE-BSD FRML MDRD: 86 ML/MIN/1.73SQ M
GLUCOSE SERPL-MCNC: 98 MG/DL (ref 65–140)
HCT VFR BLD AUTO: 31.1 % (ref 34.8–46.1)
HGB BLD-MCNC: 9.8 G/DL (ref 11.5–15.4)
MAGNESIUM SERPL-MCNC: 1.9 MG/DL (ref 1.9–2.7)
MCH RBC QN AUTO: 28.2 PG (ref 26.8–34.3)
MCHC RBC AUTO-ENTMCNC: 31.5 G/DL (ref 31.4–37.4)
MCV RBC AUTO: 90 FL (ref 82–98)
PLATELET # BLD AUTO: 224 THOUSANDS/UL (ref 149–390)
PMV BLD AUTO: 10.4 FL (ref 8.9–12.7)
POTASSIUM SERPL-SCNC: 3.5 MMOL/L (ref 3.5–5.3)
PROCALCITONIN SERPL-MCNC: 0.1 NG/ML
RBC # BLD AUTO: 3.47 MILLION/UL (ref 3.81–5.12)
SODIUM SERPL-SCNC: 138 MMOL/L (ref 135–147)
TSH SERPL DL<=0.05 MIU/L-ACNC: 1.91 UIU/ML (ref 0.45–4.5)
WBC # BLD AUTO: 14.01 THOUSAND/UL (ref 4.31–10.16)

## 2025-01-03 PROCEDURE — 85027 COMPLETE CBC AUTOMATED: CPT | Performed by: NURSE PRACTITIONER

## 2025-01-03 PROCEDURE — 71045 X-RAY EXAM CHEST 1 VIEW: CPT

## 2025-01-03 PROCEDURE — 80048 BASIC METABOLIC PNL TOTAL CA: CPT | Performed by: NURSE PRACTITIONER

## 2025-01-03 PROCEDURE — 84443 ASSAY THYROID STIM HORMONE: CPT | Performed by: NURSE PRACTITIONER

## 2025-01-03 PROCEDURE — 84145 PROCALCITONIN (PCT): CPT | Performed by: NURSE PRACTITIONER

## 2025-01-03 PROCEDURE — 97163 PT EVAL HIGH COMPLEX 45 MIN: CPT

## 2025-01-03 PROCEDURE — 92610 EVALUATE SWALLOWING FUNCTION: CPT

## 2025-01-03 PROCEDURE — 99232 SBSQ HOSP IP/OBS MODERATE 35: CPT | Performed by: STUDENT IN AN ORGANIZED HEALTH CARE EDUCATION/TRAINING PROGRAM

## 2025-01-03 PROCEDURE — 97116 GAIT TRAINING THERAPY: CPT

## 2025-01-03 PROCEDURE — 97167 OT EVAL HIGH COMPLEX 60 MIN: CPT

## 2025-01-03 PROCEDURE — 97535 SELF CARE MNGMENT TRAINING: CPT

## 2025-01-03 PROCEDURE — 83735 ASSAY OF MAGNESIUM: CPT | Performed by: NURSE PRACTITIONER

## 2025-01-03 RX ORDER — NYSTATIN 100000 [USP'U]/G
POWDER TOPICAL 2 TIMES DAILY
Status: DISCONTINUED | OUTPATIENT
Start: 2025-01-03 | End: 2025-01-05 | Stop reason: HOSPADM

## 2025-01-03 RX ORDER — GUAIFENESIN 600 MG/1
600 TABLET, EXTENDED RELEASE ORAL EVERY 12 HOURS SCHEDULED
Status: DISCONTINUED | OUTPATIENT
Start: 2025-01-03 | End: 2025-01-05 | Stop reason: HOSPADM

## 2025-01-03 RX ADMIN — GUAIFENESIN 600 MG: 600 TABLET ORAL at 14:28

## 2025-01-03 RX ADMIN — ACETAMINOPHEN 325MG 650 MG: 325 TABLET ORAL at 07:26

## 2025-01-03 RX ADMIN — CARBIDOPA AND LEVODOPA 1 TABLET: 25; 100 TABLET ORAL at 10:09

## 2025-01-03 RX ADMIN — NYSTATIN: 100000 POWDER TOPICAL at 09:17

## 2025-01-03 RX ADMIN — PANTOPRAZOLE SODIUM 20 MG: 20 TABLET, DELAYED RELEASE ORAL at 09:17

## 2025-01-03 RX ADMIN — ENOXAPARIN SODIUM 40 MG: 40 INJECTION SUBCUTANEOUS at 09:16

## 2025-01-03 RX ADMIN — Medication 1000 UNITS: at 09:17

## 2025-01-03 RX ADMIN — ACETAMINOPHEN 325MG 650 MG: 325 TABLET ORAL at 21:37

## 2025-01-03 RX ADMIN — PIMAVANSERIN TARTRATE 34 MG: 34 CAPSULE ORAL at 21:34

## 2025-01-03 RX ADMIN — LEVOTHYROXINE SODIUM 50 MCG: 50 TABLET ORAL at 05:00

## 2025-01-03 RX ADMIN — CARBIDOPA AND LEVODOPA 1 TABLET: 25; 100 TABLET ORAL at 05:00

## 2025-01-03 RX ADMIN — PRAMIPEXOLE DIHYDROCHLORIDE 0.12 MG: 0.25 TABLET ORAL at 13:11

## 2025-01-03 RX ADMIN — GUAIFENESIN 600 MG: 600 TABLET ORAL at 21:36

## 2025-01-03 RX ADMIN — PRAMIPEXOLE DIHYDROCHLORIDE 0.12 MG: 0.25 TABLET ORAL at 21:34

## 2025-01-03 RX ADMIN — NYSTATIN: 100000 POWDER TOPICAL at 17:21

## 2025-01-03 RX ADMIN — EZETIMIBE 10 MG: 10 TABLET ORAL at 21:35

## 2025-01-03 RX ADMIN — CEFTRIAXONE 1000 MG: 1 INJECTION, SOLUTION INTRAVENOUS at 14:26

## 2025-01-03 RX ADMIN — ASPIRIN 81 MG 81 MG: 81 TABLET ORAL at 09:17

## 2025-01-03 RX ADMIN — PRAMIPEXOLE DIHYDROCHLORIDE 0.12 MG: 0.25 TABLET ORAL at 05:00

## 2025-01-03 RX ADMIN — CARBIDOPA AND LEVODOPA 1 TABLET: 25; 100 TABLET ORAL at 17:21

## 2025-01-03 RX ADMIN — CARBIDOPA AND LEVODOPA 1 TABLET: 25; 100 TABLET ORAL at 21:34

## 2025-01-03 NOTE — ASSESSMENT & PLAN NOTE
Presented from home with  due to increased weakness, unable to ambulate  History of Parkinson  Found to have UTI and suspected pneumonia  PT/OT: recommend STR, patient and her  are agreeable

## 2025-01-03 NOTE — OCCUPATIONAL THERAPY NOTE
Occupational Therapy Evaluation     Patient Name: Ankita Turpin  Today's Date: 1/3/2025  Problem List  Principal Problem:    Generalized weakness  Active Problems:    Acquired hypothyroidism    Parkinson's disease (HCC)    Primary hypertension    UTI (urinary tract infection)    Pneumonia    Past Medical History  Past Medical History:   Diagnosis Date    Coronary artery disease     HLD (hyperlipidemia)     Hypothyroid     Parkinson disease (HCC)      Past Surgical History  Past Surgical History:   Procedure Laterality Date    CHOLECYSTECTOMY LAPAROSCOPIC N/A 11/14/2023    Procedure: CHOLECYSTECTOMY LAPAROSCOPIC;  Surgeon: Linda Gilman DO;  Location: [facility];  Service: General    ORIF TIBIA & FIBULA FRACTURES Left 10/31/2022    Procedure: OPEN REDUCTION W/ INTERNAL FIXATION (ORIF) ANKLE;  Surgeon: Jason Juarez;  Location: [facility];  Service: Orthopedics        01/03/25 1016   Note Type   Note type Evaluation   Pain Assessment   Pain Assessment Tool FLACC   Pain Location/Orientation Location: Back   Pain Rating: FLACC (Rest) - Face 0   Pain Rating: FLACC (Rest) - Legs 0   Pain Rating: FLACC (Rest) - Activity 0   Pain Rating: FLACC (Rest) - Cry 0   Pain Rating: FLACC (Rest) - Consolability 0   Score: FLACC (Rest) 0   Pain Rating: FLACC (Activity) - Face 1   Pain Rating: FLACC (Activity) - Legs 0   Pain Rating: FLACC (Activity) - Activity 0   Pain Rating: FLACC (Activity) - Cry 1   Pain Rating: FLACC (Activity) - Consolability 0   Score: FLACC (Activity) 2   Restrictions/Precautions   Other Precautions Chair Alarm;Bed Alarm;Pain;Fall Risk   Home Living   Type of Home Apartment  (Stair glide to enter)   Home Layout One level;Performs ADLs on one level;Able to live on main level with bedroom/bathroom   Bathroom Shower/Tub Tub/shower unit   Bathroom Toilet Raised   Bathroom Equipment Shower chair;Grab bars in shower;Grab bars around toilet;Commode   Home Equipment Walker;Cane;Stair glide;Life alert    Additional Comments Pt reports living in an apt with stair glide to enter and was using RW for functional mobility.   Prior Function   Level of [place] Independent with functional mobility;Needs assistance with ADLs;Needs assistance with IADLS   Lives With Spouse  (Is home alone at times)   Receives Help From Family   IADLs Independent with medication management;Family/Friend/Other provides transportation;Family/Friend/Other provides meals   Falls in the last 6 months 1 to 4  (1)   Comments PTA, pt reports independence with functional mobility, has assistance for ADLs and IADLs.   ADL   UB Dressing Assistance 4  Minimal Assistance   UB Dressing Deficit Thread RUE;Thread LUE;Pull over head;Pull around back;Pull down in back   LB Dressing Assistance 1  Total Assistance   LB Dressing Deficit Don/doff R sock;Don/doff L sock   Additional Comments Pt requiring total assistance to doff/don B socks while seated EOB. Overall, anticipate the pt would require maximal/total assistance for LB ADLs at this time. UB ADLs with minimal assistance due to decreased shoulder AROM.   Bed Mobility   Supine to Sit 2  Maximal assistance   Additional items Assist x 1;HOB elevated;Bedrails;Increased time required;Verbal cues;LE management;Other;Comment  (Trunk management)   Additional Comments Pt received supine in bed upon start of session. Pt supine > sit EOB with max assist x 1 for LE management and trunk management.   Transfers   Sit to Stand 3  Moderate assistance   Additional items Assist x 1;Increased time required;Verbal cues   Stand to Sit 3  Moderate assistance   Additional items Assist x 1;Increased time required;Verbal cues   Stand pivot 3  Moderate assistance   Additional items Assist x 1;Increased time required;Verbal cues   Additional Comments Using RW   Functional Mobility   Functional Mobility   (Minimal/moderate assistance)   Additional Comments Pt participated in short functional distance in room, walking to  bathroom varying between minimal and moderate assistance with use of RW. Manual cues for weight shifting and for RW management. VCs for sequencing.   Balance   Static Sitting Fair +   Dynamic Sitting Fair   Static Standing Poor +   Dynamic Standing Poor   Ambulatory Poor   Activity Tolerance   Activity Tolerance Patient limited by fatigue;Patient limited by pain   Medical Staff Made Aware PTAdolfo   Nurse Made Aware Sruthi GACRIA   RUDELFINO Assessment   RUE Assessment   (Decreased active shoulder flexion ~100 degrees. Able to use functionally during assessment, strength grossly 3+/5.)   LUE Assessment   LUE Assessment   (Decreased active shoulder flexion ~100 degrees. Able to use functionally during assessment, strength grossly 3+/5.)   Hand Function   Gross Motor Coordination Functional   Fine Motor Coordination Functional   Hand Function Comments Pt is R hand dominant.   Sensation   Light Touch No apparent deficits   Vision-Basic Assessment   Current Vision Wears glasses only for reading   Cognition   Overall Cognitive Status WFL   Arousal/Participation Alert;Cooperative   Attention Attends with cues to redirect   Orientation Level Oriented X4   Memory Within functional limits   Following Commands Follows one step commands with increased time or repetition   Assessment   Limitation Decreased ADL status;Decreased UE ROM;Decreased UE strength;Decreased Safe judgement during ADL;Decreased cognition;Decreased endurance;Decreased self-care trans;Decreased high-level ADLs   Prognosis Good   Assessment Pt is a 74 y.o. female, admitted to [facility]/2/2025 d/t experiencing possible UTI. Dx: Generalized weakness. Pt with PMHx impacting their performance during ADL tasks, including: CAD, HLD, hypothyroid, PD, L ORIF ankle (tibia/fibula fractures). Prior to admission to the hospital Pt was performing ADLs with physical assistance. IADLs with physical assistance. Functional transfers/ambulation without physical assistance. Cognitive  status PTA was WFL. OT order placed to assess Pt's ADLs, cognitive status, and performance during functional tasks in order to maximize safety and independence while making most appropriate d/c recommendations. PT/OT co-evaluation completed at this time d/t significant mobility deficits and safety concerns. Pt's clinical presentation is currently unstable/unpredictable given new onset deficits that affect Pt's occupational performance and ability to safely return to PLOF including decrease activity tolerance, decrease standing balance, decrease performance during ADL tasks, decrease cognition, decrease BUE ROM, decrease UB MS, increased pain, decrease generalized strength, decrease activity engagement, decrease performance during functional transfers, limited family support, high fall risk, and limited insight to deficits combined with medical complications of pain impacting overall mobility status, abnormal H&H, abnormal CBC, incontinence, fear/retreat, and need for input for mobility technique/safety. Personal factors affecting Pt at time of initial evaluation include: limited home support, inability to perform IADLs, inability to perform ADLs, inability to ambulate household distances, inability to navigate community distances, limited insight into impairments, decreased initiation and engagement, and recent fall(s)/fall history. Pt will benefit from continued skilled OT services to address deficits as defined above and to maximize level independence/participation during ADLs and functional tasks to facilitate return toward PLOF and improved quality of life. From an occupational therapy standpoint, Level I (Maximum Resource Intensity) is recommended upon d/c.   Goals   Patient Goals to go home   Plan   Treatment Interventions ADL retraining;Functional transfer training;UE strengthening/ROM;Endurance training;Cognitive reorientation;Patient/family training;Equipment evaluation/education;Neuromuscular  reeducation;Fine motor coordination activities;Compensatory technique education;Continued evaluation;Energy conservation;Activityengagement   Goal Expiration Date 01/17/25   OT Treatment Day 1   OT Frequency 3-5x/wk   Discharge Recommendation   Rehab Resource Intensity Level, OT I (Maximum Resource Intensity)  (Acute rehab)   [facility] Daily Activity Inpatient   Lower Body Dressing 1   Bathing 2   Toileting 1   Upper Body Dressing 3   Grooming 3   Eating 4   Daily Activity Raw Score 14   Daily Activity Standardized Score (Calc for Raw Score >=11) 33.39   -PAC Applied Cognition Inpatient   Following a Speech/Presentation 2   Understanding Ordinary Conversation 3   Taking Medications 2   Remembering Where Things Are Placed or Put Away 2   Remembering List of 4-5 Errands 2   Taking Care of Complicated Tasks 2   Applied Cognition Raw Score 13   Applied Cognition Standardized Score 30.46   Additional Treatment Session   Start Time 1040   End Time 1053   Treatment Assessment Pt tolerated treatment fairly but is limited by fatigue, weakness, and pain. She will benefit from continued skilled OT services to maximize her independence with ADLs and functional mobility. Ptseated on raised toilet. Pt urinated and had a liquid bowel movement. Pt sit > stand from toilet with moderate assistance and RW. Verbal cues for sequencing and technique. Total assistance to don briefs and for pericare posteriorly. Pt participated in short functional distance in room, varying between moderate and minimal assistance at best with use of RW. Manual cues for weight shifting and for RW management, and verbal cues for sequencing/taking bigger steps.   End of Consult   Patient Position at End of Consult Bedside chair;Bed/Chair alarm activated;All needs within reach     The patient's raw score on the AM-PAC Daily Activity Inpatient Short Form is 14. A raw score of less than 19 suggests the patient may benefit from discharge to post-acute  rehabilitation services. Please refer to the recommendation of the Occupational Therapist for safe discharge planning.    Pt goals to be met by 1/17/2025:    Pt will demonstrate ability to complete grooming/hygiene tasks @ mod I after set-up.  Pt will demonstrate ability to complete supine<>sit @ mod I in order to increase safety and independence during ADL tasks.  Pt will demonstrate ability to complete UB ADLs including washing/dressing @ mod I in order to increase performance and participation during meaningful tasks  Pt will demonstrate ability to complete LB dressing @ min assist in order to increase safety and independence during meaningful tasks.   Pt will demonstrate ability to nadia/doff socks/shoes while sitting EOB/chair @ min assist in order to increase safety and independence during meaningful tasks.   Pt will demonstrate ability to complete toileting tasks including CM and pericare @ min assist in order to increase safety and independence during meaningful tasks.  Pt will demonstrate ability to complete EOB, chair, toilet/commode transfers @ mod I in order to increase performance and participation during functional tasks.  Pt will demonstrate ability to stand for 10 minutes while maintaining F+ balance with use of RW for UB support PRN.  Pt will demonstrate ability to tolerate 30 minute OT session with no vc'ing for deep breathing or use of energy conservation techniques in order to increase activity tolerance during functional tasks.   Pt will demonstrate Good carryover of use of energy conservation/compensatory strategies during ADLs and functional tasks in order to increase safety and reduce risk for falls.   Pt will demonstrate Good attention and participation in continued evaluation of functional ambulation house hold distances in order to assist with safe d/c planning.  Pt will attend to continued cognitive assessments 100% of the time in order to provide most appropriate d/c recommendations.   Pt  will follow 100% simple 2-step commands and be A&O x4 consistently with environmental cues to increase participation in functional activities.   Pt will identify 3 areas of interest/hobbies and 1 intervention on how to incorporate into daily life in order to increase interaction with environment and peers as well as increase participation in meaningful tasks.   Pt will demonstrate 100% carryover of BUE HEP in order to increase BUE MS and increase performance during functional tasks upon d/c home.    Maurice Matson MS, OTR/L

## 2025-01-03 NOTE — SPEECH THERAPY NOTE
Speech Language/Pathology  Speech-Language Pathology Bedside Swallow Evaluation      Patient Name: Ankita Turpin    Today's Date: 1/3/2025       Summary   Consult received for bedside swallow assessment. Pt admitted w/ generalized weakness, UTI and PNA. PMHx includes Parkinsons disease, hypothyroidism and HTN. Currently on regular textures w/ thin liquids. Pt reports a persistent cough but that it is not always with eating/drinking. Denies hx of PNA. Dentition is natural, face is mask-like. RN reports pt did well w/ pills this morning, took whole w/ water.   Seen w/ lunch meal of tilapia, potato, carrots and thins via straw. Required assistance for set up and self feeding d/t weakness. Pt demo'd mild oral dysphagia characterized by prolonged mastication but adequate clearance of solids. Suspect pharyngeal phase of swallow is grossly WFL. Pt w/ no overt coughing, throat clearing or wet vocal quality w/ intake. Intermittent cough apart from intake.    Risk/s for Aspiration: Low-Moderate- if PNA re-occurs, recommend further assessment of pharyngeal phase of swallow d/t PD hx. At this time low suspicion of prandial aspiration based off of bedside assessment and no hx of PNA.      Recommended Diet: regular diet and thin liquids   Recommended Form of Meds: whole with liquid   Aspiration precautions and swallowing strategies: upright posture  Other Recommendations: Continue frequent oral care        Current Medical Status  Ankita Turpin is a 74 y.o. female with a PMH of CAD, hyperlipidemia, hypertension, hypothyroidism, Parkinson's disease who presents with discharge ambulatory dysfunction and dysuria/urinary frequency.  Patient reports urinary frequency with dark foul-smelling urine at home for the past few days and increased weakness.  Positive UA in ED, CT abdomen pelvis with suspected pneumonia and empiric ceftriaxone initiated.  Unable to ambulate in the ED and presented to the medical service for further  evaluation and treatment.    Current Precautions:  Fall      Allergies:  No known food allergies    Past medical history:  Please see H&P for details    Special Studies:  CT chest:   Asymmetric airspace opacity at the right lung base is suspicious for developing pneumonia.  No evidence of hydronephrosis or obvious calculi on this contrast examination.  Slight haziness of the perivesicular fat could be related to urinary tract infection.    CXR:  Mild right basilar atelectasis.    Social/Education/Vocational Hx:  Pt lives with family    Swallow Information   Current Risks for Dysphagia & Aspiration: known history of dysphagia  Current Symptoms/Concerns: coughing with po  Current Diet: regular diet and thin liquids   Baseline Diet: regular diet and thin liquids      Baseline Assessment   Behavior/Cognition: alert  Speech/Language Status: able to participate in conversation  Patient Positioning: upright in recliner  Pain Status/Interventions/Response to Interventions:   No report of or nonverbal indications of pain.       Swallow Mechanism Exam  Facial: masked facies  Labial: decreased strength  Lingual: bilateral decreased strength  Velum: symmetrical  Mandible:  decreased ROM  Dentition: adequate  Vocal quality:clear/adequate   Volitional Cough: strong/productive   Respiratory Status: on RA       Consistencies Assessed and Performance   Consistencies Administered: thin liquids, mechanical soft solids, and hard solids    Oral Stage: mild  Mastication was slightly prolonged with the materials administered today.  Bolus formation and transfer were functional with no significant oral residue noted.  No overt s/s reduced oral control.    Pharyngeal Stage:  suspect WFL  Swallow Mechanics:  Swallowing initiation appeared prompt.  Laryngeal rise was palpated and judged to be within functional limits.  No coughing, throat clearing, change in vocal quality or respiratory status noted today. Cough noted apart from  intake    Esophageal Concerns: none reported    Summary and Recommendations (see above)    Results Reviewed with: patient, RN, and family     Treatment Recommended: None at this time    Liyah Mireles MS CCC-SLP  1/3/2025

## 2025-01-03 NOTE — H&P
H&P - Hospitalist   Name: Ankita Turpin 74 y.o. female I MRN: [id number]  Unit/Bed#: -01 I Date of Admission: 1/2/2025   Date of Service: 1/3/2025 I Hospital Day: 0     Assessment & Plan  Generalized weakness  Presented from home with  due to increased weakness, unable to ambulate  History of Parkinson  Found to have UTI and suspected pneumonia  PT/OT/case management  Acquired hypothyroidism  Continue PTA levothyroxine  Update TSH  Parkinson's disease (HCC)  Follows with [facility] neurology  Continue PTA Sinemet, Mirapex, pimavanserin tartrate  UTI (urinary tract infection)  UA with pyuria, bacteriuria  Urine culture pending  Empiric ceftriaxone  Pneumonia  Reports coughing after ingesting food and fluids  CT chest abdomen pelvis suspicious for pneumonia right lung base  Check chest x-ray  Empiric ceftriaxone  Speech therapy  Primary hypertension  Continue PTA metoprolol  Trend blood pressures      VTE Pharmacologic Prophylaxis:   High Risk (Score >/= 5) - Pharmacological DVT Prophylaxis Ordered: enoxaparin (Lovenox). Sequential Compression Devices Ordered.  Code Status: Level 1 - Full Code     Anticipated Length of Stay: Patient will be admitted on an observation basis with an anticipated length of stay of less than 2 midnights secondary to ambulatory dysfunction.    History of Present Illness   Chief Complaint: Urinary frequency    Ankita Turpin is a 74 y.o. female with a PMH of CAD, hyperlipidemia, hypertension, hypothyroidism, Parkinson's disease who presents with discharge ambulatory dysfunction and dysuria/urinary frequency.  Patient reports urinary frequency with dark foul-smelling urine at home for the past few days and increased weakness.  Positive UA in ED, CT abdomen pelvis with suspected pneumonia and empiric ceftriaxone initiated.  Unable to ambulate in the ED and presented to the medical service for further evaluation and treatment.    Review of Systems   Constitutional:  Negative for  chills and fever.   HENT:  Negative for ear pain and sore throat.    Eyes:  Negative for pain and visual disturbance.   Respiratory:  Positive for cough. Negative for shortness of breath.    Cardiovascular:  Negative for chest pain and palpitations.   Gastrointestinal:  Negative for abdominal pain and vomiting.   Genitourinary:  Positive for dysuria and frequency. Negative for hematuria.   Musculoskeletal:  Positive for gait problem. Negative for arthralgias and back pain.   Skin:  Negative for color change and rash.   Neurological:  Positive for weakness. Negative for seizures and syncope.   All other systems reviewed and are negative.      Historical Information   Past Medical History:   Diagnosis Date    Coronary artery disease     HLD (hyperlipidemia)     Hypothyroid     Parkinson disease (HCC)      Past Surgical History:   Procedure Laterality Date    CHOLECYSTECTOMY LAPAROSCOPIC N/A 11/14/2023    Procedure: CHOLECYSTECTOMY LAPAROSCOPIC;  Surgeon: Linda Gilman DO;  Location:  MAIN OR;  Service: General    ORIF TIBIA & FIBULA FRACTURES Left 10/31/2022    Procedure: OPEN REDUCTION W/ INTERNAL FIXATION (ORIF) ANKLE;  Surgeon: Jason Juarez;  Location:  MAIN OR;  Service: Orthopedics     Social History     Tobacco Use    Smoking status: Never    Smokeless tobacco: Never   Vaping Use    Vaping status: Never Used   Substance and Sexual Activity    Alcohol use: Not Currently    Drug use: Never    Sexual activity: Not on file     E-Cigarette/Vaping    E-Cigarette Use Never User      E-Cigarette/Vaping Substances     History reviewed. No pertinent family history.  Social History:  Marital Status: /Civil Union   Occupation: retired  Patient Pre-hospital Living Situation: With spouse  Patient Pre-hospital Level of Mobility: unable to be assessed at time of evaluation  Patient Pre-hospital Diet Restrictions: none    Meds/Allergies   I have reviewed home medications with patient personally.  Prior  to Admission medications    Medication Sig Start Date End Date Taking? Authorizing Provider   doxycycline hyclate (VIBRAMYCIN) 100 mg capsule Take 1 capsule (100 mg total) by mouth 2 (two) times a day for 7 days 1/2/25 1/9/25 Yes Judith Petty DO   pramipexole (MIRAPEX) 0.125 mg tablet Take 0.125 mg by mouth 3 (three) times a day 12/5/24  Yes Historical Provider, MD   aspirin 81 mg chewable tablet Chew 81 mg daily    Historical Provider, MD   carbidopa-levodopa (SINEMET)  mg per tablet Take 1 tablet by mouth 4 (four) times a day    Historical Provider, MD   cholecalciferol (VITAMIN D3) 1,000 units tablet Take 1,000 Units by mouth daily TAKES 2,000 UNITS    Historical Provider, MD   ezetimibe (ZETIA) 10 mg tablet Take by mouth 5/24/22   Historical Provider, MD   glycerin-hypromellose- (ARTIFICIAL TEARS) 0.2-0.2-1 % SOLN Administer 1 drop to both eyes every 4 (four) hours as needed (dry eye) 11/4/22   BOLIVAR Lou   levothyroxine 50 mcg tablet TAKE 1 TABLET BY MOUTH DAILY AT LEAST 30 MINUTES PRIOR TO FIRST MEAL OF THE DAY OR OTHER MEDICATIONS 5/24/22   Historical Provider, MD   metoprolol tartrate (LOPRESSOR) 25 mg tablet Take by mouth 12/8/21   Historical Provider, MD   omeprazole (PriLOSEC OTC) 20 MG tablet Take by mouth    Historical Provider, MD   oxybutynin (DITROPAN) 5 mg tablet Take 1 tablet (5 mg total) by mouth 2 (two) times a day  Patient not taking: Reported on 11/13/2023 11/4/22   BOLIVAR Lou   Pimavanserin Tartrate (Nuplazid) 34 MG CAPS Take 34 mg by mouth daily at bedtime 3/23/22   Historical Provider, MD   Pimavanserin Tartrate (Nuplazid) 34 MG CAPS Take by mouth    Historical Provider, MD   simvastatin (ZOCOR) 10 mg tablet Take by mouth  Patient not taking: Reported on 11/13/2023    Historical Provider, MD     Allergies   Allergen Reactions    Lidocaine Anaphylaxis    Mushroom Extract Complex - Food Allergy Anaphylaxis and Throat Swelling     Butamben-Tetracaine-Benzocaine Edema    Codeine GI Intolerance     Upset stomach      Sertraline Itching     Reaction unknown      Statins Myalgia       Objective :  Temp:  [97.2 °F (36.2 °C)-100.4 °F (38 °C)] 97.2 °F (36.2 °C)  HR:  [69-83] 69  BP: (121-178)/(63-83) 121/69  Resp:  [16-20] 19  SpO2:  [94 %-99 %] 94 %  O2 Device: None (Room air)    Physical Exam  Vitals and nursing note reviewed.   Constitutional:       General: She is not in acute distress.     Appearance: She is well-developed.   HENT:      Head: Normocephalic and atraumatic.      Mouth/Throat:      Mouth: Mucous membranes are dry.   Eyes:      Conjunctiva/sclera: Conjunctivae normal.   Cardiovascular:      Rate and Rhythm: Normal rate and regular rhythm.      Heart sounds: No murmur heard.  Pulmonary:      Effort: Pulmonary effort is normal. No respiratory distress.      Breath sounds: Normal breath sounds.   Abdominal:      Palpations: Abdomen is soft.      Tenderness: There is no abdominal tenderness.   Musculoskeletal:         General: No swelling.      Cervical back: Neck supple.      Comments: Parkinsonian movements   Skin:     General: Skin is warm and dry.      Capillary Refill: Capillary refill takes less than 2 seconds.   Neurological:      General: No focal deficit present.      Mental Status: She is alert and oriented to person, place, and time.   Psychiatric:         Mood and Affect: Mood normal.         Behavior: Behavior normal.            Lab Results: I have reviewed the following results:  Results from last 7 days   Lab Units 01/02/25  1456   WBC Thousand/uL 16.55*   HEMOGLOBIN g/dL 11.2*   HEMATOCRIT % 35.5   PLATELETS Thousands/uL 258   SEGS PCT % 87*   LYMPHO PCT % 5*   MONO PCT % 6   EOS PCT % 1     Results from last 7 days   Lab Units 01/02/25  1456   SODIUM mmol/L 137   POTASSIUM mmol/L 4.1   CHLORIDE mmol/L 103   CO2 mmol/L 28   BUN mg/dL 21   CREATININE mg/dL 0.72   ANION GAP mmol/L 6   CALCIUM mg/dL 8.3*   ALBUMIN g/dL 4.1    TOTAL BILIRUBIN mg/dL 0.61   ALK PHOS U/L 84   ALT U/L 20   AST U/L 18   GLUCOSE RANDOM mg/dL 118             Lab Results   Component Value Date    HGBA1C 5.7 (H) 08/02/2024    HGBA1C 5.8 (H) 02/08/2024    HGBA1C 5.8 (H) 01/30/2023     Results from last 7 days   Lab Units 01/02/25  2108   LACTIC ACID mmol/L 0.9   PROCALCITONIN ng/ml 0.08       Imaging Results Review: I reviewed radiology reports from this admission including: CT abdomen/pelvis.  Other Study Results Review: EKG was reviewed.       ** Please Note: This note has been constructed using a voice recognition system. **

## 2025-01-03 NOTE — PLAN OF CARE
Problem: Prexisting or High Potential for Compromised Skin Integrity  Goal: Skin integrity is maintained or improved  Description: INTERVENTIONS:  - Identify patients at risk for skin breakdown  - Assess and monitor skin integrity  - Assess and monitor nutrition and hydration status  - Monitor labs   - Assess for incontinence   - Turn and reposition patient  - Assist with mobility/ambulation  - Relieve pressure over bony prominences  - Avoid friction and shearing  - Provide appropriate hygiene as needed including keeping skin clean and dry  - Evaluate need for skin moisturizer/barrier cream  - Collaborate with interdisciplinary team   - Patient/family teaching  - Consider wound care consult   Outcome: Progressing     Problem: PAIN - ADULT  Goal: Verbalizes/displays adequate comfort level or baseline comfort level  Description: Interventions:  - Encourage patient to monitor pain and request assistance  - Assess pain using appropriate pain scale  - Administer analgesics based on type and severity of pain and evaluate response  - Implement non-pharmacological measures as appropriate and evaluate response  - Consider cultural and social influences on pain and pain management  - Notify physician/advanced practitioner if interventions unsuccessful or patient reports new pain  Outcome: Progressing     Problem: INFECTION - ADULT  Goal: Absence or prevention of progression during hospitalization  Description: INTERVENTIONS:  - Assess and monitor for signs and symptoms of infection  - Monitor lab/diagnostic results  - Monitor all insertion sites, i.e. indwelling lines, tubes, and drains  - Monitor endotracheal if appropriate and nasal secretions for changes in amount and color  - [place] appropriate cooling/warming therapies per order  - Administer medications as ordered  - Instruct and encourage patient and family to use good hand hygiene technique  - Identify and instruct in appropriate isolation precautions for  identified infection/condition  Outcome: Progressing  Goal: Absence of fever/infection during neutropenic period  Description: INTERVENTIONS:  - Monitor WBC    Outcome: Progressing     Problem: SAFETY ADULT  Goal: Patient will remain free of falls  Description: INTERVENTIONS:  - Educate patient/family on patient safety including physical limitations  - Instruct patient to call for assistance with activity   - Consult OT/PT to assist with strengthening/mobility   - Keep Call bell within reach  - Keep bed low and locked with side rails adjusted as appropriate  - Keep care items and personal belongings within reach  - Initiate and maintain comfort rounds  - Make Fall Risk Sign visible to staff  - Offer Toileting every 2 Hours, in advance of need  - Initiate/Maintain bed/ chair alarm  - Apply yellow socks and bracelet for high fall risk patients  - Consider moving patient to room near nurses station  Outcome: Progressing  Goal: Maintain or return to baseline ADL function  Description: INTERVENTIONS:  -  Assess patient's ability to carry out ADLs; assess patient's baseline for ADL function and identify physical deficits which impact ability to perform ADLs (bathing, care of mouth/teeth, toileting, grooming, dressing, etc.)  - Assess/evaluate cause of self-care deficits   - Assess range of motion  - Assess patient's mobility; develop plan if impaired  - Assess patient's need for assistive devices and provide as appropriate  - Encourage maximum independence but intervene and supervise when necessary  - Involve family in performance of ADLs  - Assess for home care needs following discharge   - Consider OT consult to assist with ADL evaluation and planning for discharge  - Provide patient education as appropriate  Outcome: Progressing  Goal: Maintains/Returns to pre admission functional level  Description: INTERVENTIONS:  - Perform AM-PAC 6 Click Basic Mobility/ Daily Activity assessment daily.  - Set and communicate daily  mobility goal to care team and patient/family/caregiver.   - Collaborate with rehabilitation services on mobility goals if consulted  - Perform Range of Motion 2 times a day.  - Reposition patient every 2 hours.  - Dangle patient 3 times a day  - Stand patient 3 times a day  - Ambulate patient 3 times a day  - Out of bed to chair 3 times a day   - Out of bed for meals 3 times a day  - Out of bed for toileting  - Record patient progress and toleration of activity level   Outcome: Progressing     Problem: DISCHARGE PLANNING  Goal: Discharge to home or other facility with appropriate resources  Description: INTERVENTIONS:  - Identify barriers to discharge w/patient and caregiver  - Arrange for needed discharge resources and transportation as appropriate  - Identify discharge learning needs (meds, wound care, etc.)  - Arrange for interpretive services to assist at discharge as needed  - Refer to Case Management Department for coordinating discharge planning if the patient needs post-hospital services based on physician/advanced practitioner order or complex needs related to functional status, cognitive ability, or social support system  Outcome: Progressing     Problem: Knowledge Deficit  Goal: Patient/family/caregiver demonstrates understanding of disease process, treatment plan, medications, and discharge instructions  Description: Complete learning assessment and assess knowledge base.  Interventions:  - Provide teaching at level of understanding  - Provide teaching via preferred learning methods  Outcome: Progressing

## 2025-01-03 NOTE — CASE MANAGEMENT
Case Management Assessment & Discharge Planning Note    Patient name Ankita Turpin  Location /-01 MRN [id number]  : 1950 Date 1/3/2025       Current Admission Date: 2025  Current Admission Diagnosis:Generalized weakness   Patient Active Problem List    Diagnosis Date Noted Date Diagnosed    Generalized weakness 2025     UTI (urinary tract infection) 2025     Pneumonia 2025     Positive blood culture 11/15/2023     Transaminitis 11/15/2023     Morbid obesity due to excess calories (HCC) 2022     Frequent urination 2022     Fall down steps 10/30/2022     Closed fracture dislocation of left ankle 10/30/2022     Parkinson's disease (HCC) 10/30/2022     Primary hypertension 10/30/2022     Gastro-esophageal reflux disease without esophagitis 01/10/2022     Chronic obstructive pulmonary disease (HCC) 2021     Acquired hypothyroidism 2015       LOS (days): 0  Geometric Mean LOS (GMLOS) (days):   Days to GMLOS:     OBJECTIVE:              Current admission status: Inpatient  Referral Reason: Other (Post acute placement)    Preferred Pharmacy: No Pharmacies Listed  Primary Care Provider: Jose Daly DO    Primary Insurance: [facility] REP  Secondary Insurance:     ASSESSMENT:  Active Health Care Proxies    There are no active Health Care Proxies on file.       Advance Directives  Does patient have a Health Care POA?: No  Was patient offered paperwork?: Yes  Does patient have Advance Directives?: No  Was patient offered paperwork?: Yes  Primary Contact: Tarun Turpin- spouse         Readmission Root Cause  30 Day Readmission: No    Patient Information  Admitted from:: Home  Mental Status: Alert  During Assessment patient was accompanied by: Spouse  Assessment information provided by:: Patient  Primary Caregiver: Spouse  Caregiver's Name:: Tarun Turpin- spouse  Caregiver's Relationship to Patient:: Significant Other  Caregiver's Telephone  Number:: [phone]  Support Systems: Spouse/significant other, Family members  [facility]  [facility] you [facility]  Home entry access options. Select all that apply.: Stairs  Number of steps to enter home.: 1  Do the steps have railings?: Yes  Type of Current Residence: Apartment  Floor Level: 2  Upon entering residence, is there a bedroom on the main floor (no further steps)?: Yes  Upon entering residence, is there a bathroom on the main floor (no further steps)?: Yes  Living Arrangements: Lives w/ Spouse/significant other  Is patient a ?: No    Activities of Daily Living Prior to Admission  Functional Status: Assistance  Completes ADLs independently?: No  Level of ADL dependence: Assistance  Ambulates independently?: No  Level of ambulatory dependence: Assistance  Does patient use assisted devices?: Yes  Assisted Devices (DME) used: Walker, Rollator, Shower Chair, Hospital Bed, Other (Comment) (High toilet  seat)  Does patient currently own DME?: Yes  What DME does the patient currently own?: Hospital Bed, Rollator, Shower Chair, Straight Cane, Walker  Does patient have a history of Outpatient Therapy (PT/OT)?: Yes  Does the patient have a history of Short-Term Rehab?: Yes ([facility])  Does patient have a history of HHC?: Yes  Does patient currently have HHC?: No         Patient Information Continued  Income Source: Pension/halfway  Does patient have prescription coverage?: Yes  Does patient receive dialysis treatments?: No  Does patient have a history of substance abuse?: No  Does patient have a history of Mental Health Diagnosis?: No         Means of Transportation  [facility]      Social Determinants of Health (SDOH)      Flowsheet Row Most Recent Value   Housing Stability    In the last 12 months, was there a time when you were not able to pay the mortgage or rent on time? N   In the past 12 months, how many times have you  moved where you were living? 0   At any time in the past 12 months, were you homeless or living in a shelter (including now)? N   Transportation Needs    In the past 12 months, has lack of transportation kept you from medical appointments or from getting medications? no   In the past 12 months, has lack of transportation kept you from meetings, work, or from getting things needed for daily living? No   Food Insecurity    Within the past 12 months, you worried that your food would run out before you got the money to buy more. Never true   Within the past 12 months, the food you bought just didn't last and you didn't have money to get more. Never true   Utilities    In the past 12 months has the electric, gas, oil, or water company threatened to shut off services in your home? No            DISCHARGE DETAILS:    Discharge planning discussed with:: patient and spouse  Freedom of Choice: Yes  Comments - Freedom of Choice: requesting STR at [facility]  CM contacted family/caregiver?: Yes  Were Treatment Team discharge recommendations reviewed with patient/caregiver?: Yes  Did patient/caregiver verbalize understanding of patient care needs?: Yes  Were patient/caregiver advised of the risks associated with not following Treatment Team discharge recommendations?: Yes    Contacts  Patient Contacts: Tarun francis  Relationship to Patient:: Family  Contact Method: In Person  Reason/Outcome: Discharge Planning              Other Referral/Resources/Interventions Provided:  Interventions: Short Term Rehab         Treatment Team Recommendation: Short Term Rehab  Discharge Destination Plan:: Short Term Rehab          CM met with patient and spouse at bedside to review role of CM and discuss any supports needed upon discharge. Baseline information obtained, pt indicates she lives at home with spouse in apartment with [address]. Pt indicates she is able to ambulate with assistance using a walker. Pts spouse is pts primary  care giver at home. Pts spouse assist with ADLs, cleaning, cooking and transportation. Care team recommendations for STR discussed, both agreeable. Pt and spouse requested [facility] for STR, pt has been there in the past and happy with the care. Referral placed in aidin to [facility] to check bed availability. CM will continue to follow.             CM placed call to [facility], admissions is gone for the day. CM spoke with pt and spouse made aware. They are agreeable to place a blanket referral for STR just  in case [facility] does not have beds available they have other choices. Discussed acute at [facility], agreeable to make referral.   [facility] declined pt incorrect level of care. Pt and spouse aware. Want to wait to hear from [facility] that is their 1st choice then pt will need prior auth.

## 2025-01-03 NOTE — PLAN OF CARE
Problem: PHYSICAL THERAPY ADULT  Goal: Performs mobility at highest level of function for planned discharge setting.  See evaluation for individualized goals.  Description: Treatment/Interventions: Functional transfer training, LE strengthening/ROM, Therapeutic exercise, Endurance training, Cognitive reorientation, Patient/family training, Equipment eval/education, Bed mobility, Gait training, Compensatory technique education, Spoke to nursing, Spoke to case management, OT          See flowsheet documentation for full assessment, interventions and recommendations.  Note: Prognosis: Fair  Problem List: Decreased strength, Decreased endurance, Impaired balance, Decreased mobility, Obesity  Assessment: Pt is a 74 y.o. female seen for PT evaluation s/p admission to [facility] on 1/2/2025 with Generalized weakness.  Order placed for PT services.  Upon evaluation: Pt is presenting with impaired functional mobility due to decreased strength, decreased endurance, impaired balance, gait deviations, and fall risk requiring  max assistance for bed mobility and mod assistance for transfers and ambulation with RW . Pt's clinical presentation is currently unstable/unpredictable given the functional mobility deficits above, coupled with fall risks as indicated by AM-PAC 6-Clicks: 11/24 as well as obesity and combined with medical complications of abnormal H&H, abnormal WBCs, abnormal CBC, and need for input for mobility technique/safety.  Pt's PMHx and comorbidities that may affect physical performance and progress include: CAD and Parkinson's Disease. Personal factors affecting pt at time of IE include: inability to perform IADLs, inability to perform ADLs, inability to navigate level surfaces without external assistance, inability to navigate community distances, and limited insight into impairments. Pt will benefit from continued skilled PT services to address deficits as defined above and to maximize level  of functional mobility to facilitate return toward PLOF and improved QOL. From PT/mobility standpoint, recommendation at time of d/c would be Level I (Maximum Resource Intensity) pending progress in order to reduce fall risk and maximize pt's functional independence and consistency with mobility in order to facilitate return to PLOF.  Recommend trial with walker next 1-2 sessions.  Barriers to Discharge: Other (Comment)  Barriers to Discharge Comments: current assist level for mobility  Rehab Resource Intensity Level, PT: I (Maximum Resource Intensity)    See flowsheet documentation for full assessment.

## 2025-01-03 NOTE — PROGRESS NOTES
Progress Note - Hospitalist   Name: Ankita Turpin 74 y.o. female I MRN: [id number]  Unit/Bed#: -Ceci I Date of Admission: 1/2/2025   Date of Service: 1/3/2025 I Hospital Day: 0    Assessment & Plan  Generalized weakness  Presented from home with  due to increased weakness, unable to ambulate  History of Parkinson  Found to have UTI and suspected pneumonia  PT/OT: recommend STR, patient and her  are agreeable  Acquired hypothyroidism  Continue PTA levothyroxine  Update TSH  Parkinson's disease (HCC)  Follows with [facility] neurology  Continue PTA Sinemet, Mirapex, pimavanserin tartrate  UTI (urinary tract infection)  UA with pyuria, bacteriuria  Urine culture pending  Empiric ceftriaxone  Pneumonia  Reports coughing after ingesting food and fluids  CT chest abdomen pelvis suspicious for pneumonia right lung base  Check chest x-ray  Empiric ceftriaxone  Primary hypertension  Continue PTA metoprolol  Trend blood pressures    VTE Pharmacologic Prophylaxis:   High Risk (Score >/= 5) - Pharmacological DVT Prophylaxis Ordered: enoxaparin (Lovenox). Sequential Compression Devices Ordered.    Mobility:   Basic Mobility Inpatient Raw Score: 11  JH-HLM Goal: 4: Move to chair/commode  JH-HLM Achieved: 7: Walk 25 feet or more  JH-HLM Goal achieved. Continue to encourage appropriate mobility.    Patient Centered Rounds: I performed bedside rounds with nursing staff today.   Discussions with Specialists or Other Care Team Provider: CM, PT/OT    Education and Discussions with Family / Patient: Updated  () at bedside.    Current Length of Stay: 0 day(s)  Current Patient Status: Observation   Certification Statement: The patient will continue to require additional inpatient hospital stay due to safe discharge planning  Discharge Plan: Anticipate discharge in 24-48 hrs to rehab facility.    Code Status: Level 1 - Full Code    Subjective   Patient seen and examined at bedside.  No acute events  overnight.  Patient reports that she was diagnosed with Parkinson's approximately 5 years ago.  She reports weakness on ambulation prompting her ED visit yesterday.  She was evaluated by PT/OT who recommend patient should go to STR.  Patient and her  are agreeable to this.  No other acute complaints at this time.    Objective :  Temp:  [97.2 °F (36.2 °C)-100.4 °F (38 °C)] 97.3 °F (36.3 °C)  HR:  [69-83] 80  BP: (105-178)/(48-83) 105/56  Resp:  [16-20] 18  SpO2:  [92 %-99 %] 95 %  O2 Device: None (Room air)    Body mass index is 38.51 kg/m².     Input and Output Summary (last 24 hours):     Intake/Output Summary (Last 24 hours) at 1/3/2025 1348  Last data filed at 1/3/2025 1315  Gross per 24 hour   Intake 1350 ml   Output 115 ml   Net 1235 ml       Physical Exam  Constitutional:       General: She is not in acute distress.     Appearance: Normal appearance. She is not toxic-appearing.   HENT:      Head: Normocephalic and atraumatic.   Eyes:      Extraocular Movements: Extraocular movements intact.      Pupils: Pupils are equal, round, and reactive to light.   Cardiovascular:      Rate and Rhythm: Normal rate and regular rhythm.   Pulmonary:      Effort: Pulmonary effort is normal.      Breath sounds: Normal breath sounds.   Skin:     General: Skin is warm.   Neurological:      Mental Status: She is alert and oriented to person, place, and time. Mental status is at baseline.      Comments: Parkinsonian movements    Psychiatric:         Mood and Affect: Mood normal.         Behavior: Behavior normal.           Lines/Drains:              Lab Results: I have reviewed the following results:   Results from last 7 days   Lab Units 01/03/25  0504 01/02/25  1456   WBC Thousand/uL 14.01* 16.55*   HEMOGLOBIN g/dL 9.8* 11.2*   HEMATOCRIT % 31.1* 35.5   PLATELETS Thousands/uL 224 258   SEGS PCT %  --  87*   LYMPHO PCT %  --  5*   MONO PCT %  --  6   EOS PCT %  --  1     Results from last 7 days   Lab Units 01/03/25  0504  01/02/25  1456   SODIUM mmol/L 138 137   POTASSIUM mmol/L 3.5 4.1   CHLORIDE mmol/L 106 103   CO2 mmol/L 26 28   BUN mg/dL 17 21   CREATININE mg/dL 0.67 0.72   ANION GAP mmol/L 6 6   CALCIUM mg/dL 8.0* 8.3*   ALBUMIN g/dL  --  4.1   TOTAL BILIRUBIN mg/dL  --  0.61   ALK PHOS U/L  --  84   ALT U/L  --  20   AST U/L  --  18   GLUCOSE RANDOM mg/dL 98 118                 Results from last 7 days   Lab Units 01/03/25  0504 01/02/25  2108   LACTIC ACID mmol/L  --  0.9   PROCALCITONIN ng/ml 0.10 0.08       Recent Cultures (last 7 days):   Results from last 7 days   Lab Units 01/02/25  1456   URINE CULTURE  >100,000 cfu/ml Gram Negative Markell Enteric Like*             Last 24 Hours Medication List:     Current Facility-Administered Medications:     acetaminophen (TYLENOL) tablet 650 mg, Q6H PRN    Artificial Tears Op Soln 1 drop, Q4H PRN    aspirin chewable tablet 81 mg, Daily    carbidopa-levodopa (SINEMET)  mg per tablet 1 tablet, 4x Daily    cefTRIAXone (ROCEPHIN) IVPB (premix in dextrose) 1,000 mg 50 mL, Q24H    Cholecalciferol (VITAMIN D3) tablet 1,000 Units, Daily    enoxaparin (LOVENOX) subcutaneous injection 40 mg, Daily    ezetimibe (ZETIA) tablet 10 mg, HS    guaiFENesin (MUCINEX) 12 hr tablet 600 mg, Q12H JAMES    levothyroxine tablet 50 mcg, Early Morning    metoprolol tartrate (LOPRESSOR) tablet 25 mg, Daily    nystatin (MYCOSTATIN) powder, BID    pantoprazole (PROTONIX) EC tablet 20 mg, Daily    Pimavanserin Tartrate CAPS 34 mg, HS    pramipexole (MIRAPEX) tablet 0.125 mg, TID    Administrative Statements   Today, Patient Was Seen By: Jennifer Oleary MD      **Please Note: This note may have been constructed using a voice recognition system.**

## 2025-01-03 NOTE — ASSESSMENT & PLAN NOTE
Reports coughing after ingesting food and fluids  CT chest abdomen pelvis suspicious for pneumonia right lung base  Check chest x-ray  Empiric ceftriaxone  Speech therapy

## 2025-01-03 NOTE — ASSESSMENT & PLAN NOTE
Reports coughing after ingesting food and fluids  CT chest abdomen pelvis suspicious for pneumonia right lung base  Check chest x-ray  Empiric ceftriaxone

## 2025-01-03 NOTE — UTILIZATION REVIEW
Initial Clinical Review    WAS OBSERVATION 1/2/25 @ 1838 CONVERTED TO INPATIENT ADMISSION 1/3/25 @ 1515 DUE TO CONTINUED STAY REQUIRED TO CARE FOR PATIENT WITH DX: GENERALIZED WEAKNESS.    Admission: Date/Time/Statement:   Admission Orders (From admission, onward)       Ordered        01/03/25 1515  INPATIENT ADMISSION  Once            01/02/25 1838  Place in Observation  Once                          Orders Placed This Encounter   Procedures    INPATIENT ADMISSION     Standing Status:   Standing     Number of Occurrences:   1     Level of Care:   Med Surg [16]     Estimated length of stay:   More than 2 Midnights     Certification:   I certify that inpatient services are medically necessary for this patient for a duration of greater than two midnights. See H&P and MD Progress Notes for additional information about the patient's course of treatment.     ED Arrival Information       Expected   1/2/2025 14:31    Arrival   1/2/2025 14:30    Acuity   Urgent              Means of arrival   Ambulance    Escorted by   [facility] [place]   Hospitalist    Admission type   Emergency              Arrival complaint   UTI             Chief Complaint   Patient presents with    Possible UTI     Pt arrives via EMS from home; hx of utis, has had dark, foul smelling urine and generalized weakness for a few days       Initial Presentation: 74 y.o. female to ED via EMS from home   Present to ED with ambulatory dysfunction and dysuria/urinary frequency. Patient reports urinary frequency with dark foul-smelling urine at home for the past few days and increased weakness. Positive UA in ED.  Unable to ambulate in the ED   PMHX CAD, hyperlipidemia, hypertension, hypothyroidism, Parkinson's disease   Admitted to OBS with DX: Generalized weakness   on exam: hypertensive; T 100.4; WBC 16.55; UA with pyuria, bacteriuria   CT  chest abdomen pelvis suspicious for pneumonia right lung base   PLAN: cont iv  abx; monitor labs; f/u urine cx; PT/ OT eval - tx; f/u cxr; Speech eval; trend / monitor BP's       Anticipated Length of Stay/Certification Statement: Patient will be admitted on an observation basis with an anticipated length of stay of less than 2 midnights secondary to ambulatory dysfunction.       Date: 1/3/25 - CHANGED TO INPATIENT     CXR = Mild right basilar atelectasis. PT/ OT recom rehab   Plan: cont iv abx; monitor labs; f/u urine cx; Speech eval; trend / monitor BP's       ED Treatment-Medication Administration from 01/02/2025 1430 to 01/02/2025 1938         Date/Time Order Dose Route Action     01/02/2025 1454 sodium chloride 0.9 % bolus 1,000 mL 1,000 mL Intravenous New Bag     01/02/2025 1535 cefTRIAXone (ROCEPHIN) IVPB (premix in dextrose) 1,000 mg 50 mL 1,000 mg Intravenous New Bag     01/02/2025 1535 iohexol (OMNIPAQUE) 350 MG/ML injection (MULTI-DOSE) 100 mL 100 mL Intravenous Given     01/02/2025 1741 doxycycline hyclate (VIBRAMYCIN) capsule 100 mg 100 mg Oral Given            Scheduled Medications:  aspirin, 81 mg, Oral, Daily  carbidopa-levodopa, 1 tablet, Oral, 4x Daily  cefTRIAXone, 1,000 mg, Intravenous, Q24H  cholecalciferol, 1,000 Units, Oral, Daily  enoxaparin, 40 mg, Subcutaneous, Daily  ezetimibe, 10 mg, Oral, HS  levothyroxine, 50 mcg, Oral, Early Morning  metoprolol tartrate, 25 mg, Oral, Daily  nystatin, , Topical, BID  pantoprazole, 20 mg, Oral, Daily  Pimavanserin Tartrate, 34 mg, Oral, HS  pramipexole, 0.125 mg, Oral, TID      Continuous IV Infusions: none       PRN Meds:  acetaminophen, 650 mg, Oral, Q6H PRN  Artificial Tears, 1 drop, Both Eyes, Q4H PRN      ED Triage Vitals   Temperature Pulse Respirations Blood Pressure SpO2 Pain Score   01/02/25 1440 01/02/25 1436 01/02/25 1436 01/02/25 1436 01/02/25 1436 01/02/25 1436   100.4 °F (38 °C) 74 16 (!) 178/81 96 % No Pain     Weight (last 2 days)       Date/Time Weight    01/02/25 1436 95.5 (210.54)            Vital Signs (last 3  days)       Date/Time Temp Pulse Resp BP MAP (mmHg) SpO2 O2 Device Patient Position - Orthostatic VS Pain    01/03/25 1015 -- -- -- -- -- -- -- -- No Pain    01/03/25 0917 -- 80 -- 105/56 -- -- -- -- --    01/03/25 0723 -- -- -- -- -- 95 % None (Room air) -- 2    01/03/25 07:15:16 97.3 °F (36.3 °C) 79 18 118/48 71 92 % -- -- --    01/02/25 22:49:31 97.2 °F (36.2 °C) 69 19 121/69 86 94 % -- -- --    01/02/25 19:41:38 97.3 °F (36.3 °C) 83 -- 133/82 99 95 % -- -- --    01/02/25 1941 -- -- -- -- -- -- None (Room air) -- No Pain    01/02/25 19:40:47 97.3 °F (36.3 °C) -- -- 133/82 99 -- -- -- --    01/02/25 1900 -- 74 20 150/83 111 96 % None (Room air) Sitting --    01/02/25 1635 -- 70 20 170/69 99 99 % -- -- --    01/02/25 1500 -- 69 19 149/63 99 98 % -- -- --    01/02/25 1458 -- -- -- -- -- -- None (Room air) -- --    01/02/25 1440 100.4 °F (38 °C) -- -- -- -- -- -- -- --    01/02/25 1436 -- 74 16 178/81 -- 96 % None (Room air) Sitting No Pain              Pertinent Labs/Diagnostic Test Results:   Radiology:  XR chest portable   Final Interpretation by Rome Manley MD (01/03 1028)      Mild right basilar atelectasis.            Resident: Bren Dover I, the attending radiologist, have reviewed the images and agree with the final report above.      Workstation performed: [id number]         CT abdomen pelvis with contrast   Final Interpretation by Galdino Simon MD (01/02 2097)      Asymmetric airspace opacity at the right lung base is suspicious for developing pneumonia.      No evidence of hydronephrosis or obvious calculi on this contrast examination.      Slight haziness of the perivesicular fat could be related to urinary tract infection.      The study was marked in EPIC for immediate notification.         Workstation performed: [id number]           Cardiology:  ECG 12 lead   Final Result by Lester Bull MD (01/02 0104)   Normal sinus rhythm   Left axis deviation   Moderate voltage criteria for  LVH, may be normal variant ( R in aVL ,    Parker product )   ST & T wave abnormality, consider lateral ischemia   Abnormal ECG   When compared with ECG of 31-Oct-2022 08:55,   QT has shortened   Confirmed by Lester Bull ([id number]) on 1/2/2025 2:59:26 PM          Results from last 7 days   Lab Units 01/02/25  1743   SARS-COV-2  Negative     Results from last 7 days   Lab Units 01/03/25  0504 01/02/25  1456   WBC Thousand/uL 14.01* 16.55*   HEMOGLOBIN g/dL 9.8* 11.2*   HEMATOCRIT % 31.1* 35.5   PLATELETS Thousands/uL 224 258   TOTAL NEUT ABS Thousands/µL  --  14.41*        Results from last 7 days   Lab Units 01/03/25  0504 01/02/25  1456   SODIUM mmol/L 138 137   POTASSIUM mmol/L 3.5 4.1   CHLORIDE mmol/L 106 103   CO2 mmol/L 26 28   ANION GAP mmol/L 6 6   BUN mg/dL 17 21   CREATININE mg/dL 0.67 0.72   EGFR ml/min/1.73sq m 86 82   CALCIUM mg/dL 8.0* 8.3*   MAGNESIUM mg/dL 1.9  --      Results from last 7 days   Lab Units 01/02/25  1456   AST U/L 18   ALT U/L 20   ALK PHOS U/L 84   TOTAL PROTEIN g/dL 6.7   ALBUMIN g/dL 4.1   TOTAL BILIRUBIN mg/dL 0.61        Results from last 7 days   Lab Units 01/03/25  0504 01/02/25  1456   GLUCOSE RANDOM mg/dL 98 118        Results from last 7 days   Lab Units 01/03/25  0504   TSH 3RD GENERATON uIU/mL 1.910     Results from last 7 days   Lab Units 01/03/25  0504 01/02/25  2108   PROCALCITONIN ng/ml 0.10 0.08     Results from last 7 days   Lab Units 01/02/25  2108   LACTIC ACID mmol/L 0.9        Results from last 7 days   Lab Units 01/02/25  1456   LIPASE u/L 12        Results from last 7 days   Lab Units 01/02/25  1456   CLARITY UA  Clear   COLOR UA  Yellow   SPEC GRAV UA  1.020   PH UA  6.5   GLUCOSE UA mg/dl Negative   KETONES UA mg/dl Negative   BLOOD UA  Small*   PROTEIN UA mg/dl Negative   NITRITE UA  Negative   BILIRUBIN UA  Negative   UROBILINOGEN UA E.U./dl 0.2   LEUKOCYTES UA  Trace*   WBC UA /hpf 10-20*   RBC UA /hpf 1-2   BACTERIA UA /hpf Innumerable*   EPITHELIAL CELLS  WET PREP /hpf Occasional     Results from last 7 days   Lab Units 01/02/25  1743   INFLUENZA A PCR  Negative   INFLUENZA B PCR  Negative   RSV PCR  Negative          Past Medical History:   Diagnosis Date    Coronary artery disease     HLD (hyperlipidemia)     Hypothyroid     Parkinson disease (HCC)      Present on Admission:   Acquired hypothyroidism   Parkinson's disease (HCC)   Primary hypertension      Admitting Diagnosis: UTI (urinary tract infection) [N39.0]  Pneumonia [J18.9]  Age/Sex: 74 y.o. female    Network Utilization Review Department  ATTENTION: Please call with any questions or concerns to [phone] and carefully listen to the prompts so that you are directed to the right person. All voicemails are confidential.   For Discharge needs, contact Care Management DC Support Team at [phone] opt. 2  Send all requests for admission clinical reviews, approved or denied determinations and any other requests to dedicated fax number below belonging to the campus where the patient is receiving treatment. List of dedicated fax numbers for the Facilities:  FACILITY NAME UR FAX NUMBER   ADMISSION DENIALS (Administrative/Medical Necessity) [phone]   DISCHARGE SUPPORT TEAM (NETWORK) [phone]   PARENT CHILD HEALTH (Maternity/NICU/Pediatrics) [phone]   [facility] [phone]   [facility] [phone]   [facility] [phone]   General acute hospital [phone]   [facility] [phone]   [facility] [phone]   [facility] [phone]   [facility] [phone]   [facility] [phone]   [facility] [phone]   [facility] [phone]   [facility] [phone]

## 2025-01-03 NOTE — PHYSICAL THERAPY NOTE
" PHYSICAL THERAPY EVALUATION        NAME:  Ankita Turpin  DATE: 01/03/25    AGE:   74 y.o.  Mrn:   [id number]  ADMIT DX:  UTI (urinary tract infection) [N39.0]  Pneumonia [J18.9]    Past Medical History:   Diagnosis Date    Coronary artery disease     HLD (hyperlipidemia)     Hypothyroid     Parkinson disease (HCC)      Length Of Stay: 0  Performed at least 2 patient identifiers during session: Name and Birthday      PHYSICAL THERAPY EVALUATION:       01/03/25 1015   PT Last Visit   PT Visit Date 01/03/25   Note Type   Note type Evaluation   Pain Assessment   Pain Assessment Tool 0-10   Pain Score No Pain  (at rest)   Pain Location/Orientation Location: Back  (\"when i walk i do\")   Restrictions/Precautions   Weight Bearing Precautions Per Order No   Other Precautions Fall Risk;Chair Alarm;Bed Alarm   Home Living   Type of Home Apartment   Home Layout One level  (stair glide to enter)   Bathroom Shower/Tub Tub/shower unit   Bathroom Toilet Raised   Bathroom Equipment Shower chair;Grab bars in shower;Grab bars around toilet   Home Equipment Walker;Cane;Stair glide  (BSC, Life Alert)   Additional Comments Sleeps in recliner lift chair   Prior Function   Level of [place] Needs assistance with ADLs;Independent with functional mobility;Needs assistance with IADLS   Lives With Spouse   Receives Help From Family  (aide 3 days/wk for 2 hrs each (helps with bathing, cleaning the apartment))   IADLs Family/Friend/Other provides transportation;Family/Friend/Other provides meals;Independent with medication management   Falls in the last 6 months   (1)   Comments Ambulates with RW mod (I).  Home alone at times   General   Additional Pertinent History Parkinson's Disease   Family/Caregiver Present No   Cognition   Overall Cognitive Status WFL   Arousal/Participation Cooperative   Orientation Level Oriented X4   Following Commands Follows one step commands with increased time or repetition   Bed Mobility   Supine to Sit 2  " Maximal assistance   Additional items Assist x 1;HOB elevated;Increased time required;Verbal cues;LE management  (& trunk)   Transfers   Sit to Stand 3  Moderate assistance   Additional items Assist x 1;Increased time required;Verbal cues   Stand to Sit 3  Moderate assistance   Additional items Assist x 1;Increased time required;Verbal cues   Stand pivot 3  Moderate assistance   Additional items Assist x 1;Increased time required;Verbal cues   Additional Comments RW.  Poor motor planning on pivot.  Evident parkinsonian features requiring facilitation and cueing   Ambulation/Elevation   Gait pattern Improper Weight shift;Shuffling;Short stride;Inconsistent janna;Decreased hip extension;Decreased heel strike;Decreased toe off   Gait Assistance   (min - mod A)   Additional items Verbal cues;Tactile cues  (auditory cues, visual cues)   Assistive Device Rolling walker   Distance 18 ft   Ambulation/Elevation Additional Comments PT assisting with RW mngmnt, wt shifting, and providing cues to facilitate fwd gait   Balance   Static Sitting Fair +   Dynamic Sitting Fair   Static Standing Poor +   Dynamic Standing Poor   Ambulatory Poor +   Activity Tolerance   Medical Staff Made Aware OT Alis   Nurse Made Aware RN Sruthi   Assessment   Prognosis Fair   Problem List Decreased strength;Decreased endurance;Impaired balance;Decreased mobility;Obesity   Barriers to Discharge Other (Comment)   Barriers to Discharge Comments current assist level for mobility   Goals   STG Expiration Date 01/17/25   PT Treatment Day 1   Plan   Treatment/Interventions Functional transfer training;LE strengthening/ROM;Therapeutic exercise;Endurance training;Cognitive reorientation;Patient/family training;Equipment eval/education;Bed mobility;Gait training;Compensatory technique education;Spoke to nursing;Spoke to case management;OT   PT Frequency 3-5x/wk   Discharge Recommendation   Rehab Resource Intensity Level, PT I (Maximum Resource Intensity)    AM-PAC Basic Mobility Inpatient   Turning in Flat Bed Without Bedrails 2   Lying on Back to Sitting on Edge of Flat Bed Without Bedrails 2   Moving Bed to Chair 2   Standing Up From Chair Using Arms 2   Walk in Room 2   Climb 3-5 Stairs With Railing 1   Basic Mobility Inpatient Raw Score 11   Basic Mobility Standardized Score 30.25   [facility] Highest Level Of Mobility   -HL Goal 4: Move to chair/commode   -HL Achieved 7: Walk 25 feet or more   Additional Treatment Session   Start Time 1041   End Time 1052   Treatment Assessment Pt tolerates tx session fair.  She continues to require hands-on assistance for mobility.  Reviewed rehab rec with pt and she is indecisive stating she needs to speak with her spouse about it.   Equipment Use Pt ambulates 19 ft x 1 using RW and min - mod A.   End of Consult   Patient Position at End of Consult Bedside chair;Bed/Chair alarm activated;All needs within reach     (Please find full objective findings from PT assessment regarding body systems outlined above).     Assessment: Pt is a 74 y.o. female seen for PT evaluation s/p admission to [facility] on 1/2/2025 with Generalized weakness.  Order placed for PT services.  Upon evaluation: Pt is presenting with impaired functional mobility due to decreased strength, decreased endurance, impaired balance, gait deviations, and fall risk requiring  max assistance for bed mobility and mod assistance for transfers and ambulation with RW . Pt's clinical presentation is currently unstable/unpredictable given the functional mobility deficits above, coupled with fall risks as indicated by AM-PAC 6-Clicks: 11/24 as well as obesity and combined with medical complications of abnormal H&H, abnormal WBCs, abnormal CBC, and need for input for mobility technique/safety.  Pt's PMHx and comorbidities that may affect physical performance and progress include: CAD and Parkinson's Disease. Personal factors affecting pt at time  of IE include: inability to perform IADLs, inability to perform ADLs, inability to navigate level surfaces without external assistance, inability to navigate community distances, and limited insight into impairments. Pt will benefit from continued skilled PT services to address deficits as defined above and to maximize level of functional mobility to facilitate return toward PLOF and improved QOL. From PT/mobility standpoint, recommendation at time of d/c would be Level I (Maximum Resource Intensity) pending progress in order to reduce fall risk and maximize pt's functional independence and consistency with mobility in order to facilitate return to PLOF.  Recommend trial with walker next 1-2 sessions.     The patient's AM-PAC Basic Mobility Inpatient Short Form Raw Score is 11. A Raw score of less than or equal to 16 suggests the patient may benefit from discharge to post-acute rehabilitation services. Please also refer to the recommendation of the Physical Therapist for safe discharge planning.       Goals: Pt will perform bed mobility tasks with  min A  to reposition in bed and prepare for transfers. Pt will perform transfers with Supervision to decrease burden of care and prepare for ambulation. Pt will ambulate with RW for >/= 75 ft with  Supervision  to decrease risk for falls, improve gait quality, and promote proper use of assistive device and to access home environment.          Adolfo Moreira, PT,DPT

## 2025-01-03 NOTE — ASSESSMENT & PLAN NOTE
Presented from home with  due to increased weakness, unable to ambulate  History of Parkinson  Found to have UTI and suspected pneumonia  PT/OT/case management

## 2025-01-04 LAB
ANION GAP SERPL CALCULATED.3IONS-SCNC: 7 MMOL/L (ref 4–13)
BACTERIA UR CULT: ABNORMAL
BASOPHILS # BLD AUTO: 0.03 THOUSANDS/ΜL (ref 0–0.1)
BASOPHILS NFR BLD AUTO: 0 % (ref 0–1)
BUN SERPL-MCNC: 22 MG/DL (ref 5–25)
CALCIUM SERPL-MCNC: 8.1 MG/DL (ref 8.4–10.2)
CHLORIDE SERPL-SCNC: 106 MMOL/L (ref 96–108)
CO2 SERPL-SCNC: 25 MMOL/L (ref 21–32)
CREAT SERPL-MCNC: 0.66 MG/DL (ref 0.6–1.3)
EOSINOPHIL # BLD AUTO: 0.26 THOUSAND/ΜL (ref 0–0.61)
EOSINOPHIL NFR BLD AUTO: 3 % (ref 0–6)
ERYTHROCYTE [DISTWIDTH] IN BLOOD BY AUTOMATED COUNT: 12.8 % (ref 11.6–15.1)
GFR SERPL CREATININE-BSD FRML MDRD: 87 ML/MIN/1.73SQ M
GLUCOSE SERPL-MCNC: 97 MG/DL (ref 65–140)
HCT VFR BLD AUTO: 30.9 % (ref 34.8–46.1)
HGB BLD-MCNC: 9.8 G/DL (ref 11.5–15.4)
IMM GRANULOCYTES # BLD AUTO: 0.06 THOUSAND/UL (ref 0–0.2)
IMM GRANULOCYTES NFR BLD AUTO: 1 % (ref 0–2)
LYMPHOCYTES # BLD AUTO: 1.94 THOUSANDS/ΜL (ref 0.6–4.47)
LYMPHOCYTES NFR BLD AUTO: 19 % (ref 14–44)
MAGNESIUM SERPL-MCNC: 2.1 MG/DL (ref 1.9–2.7)
MCH RBC QN AUTO: 28.9 PG (ref 26.8–34.3)
MCHC RBC AUTO-ENTMCNC: 31.7 G/DL (ref 31.4–37.4)
MCV RBC AUTO: 91 FL (ref 82–98)
MONOCYTES # BLD AUTO: 0.88 THOUSAND/ΜL (ref 0.17–1.22)
MONOCYTES NFR BLD AUTO: 9 % (ref 4–12)
NEUTROPHILS # BLD AUTO: 6.89 THOUSANDS/ΜL (ref 1.85–7.62)
NEUTS SEG NFR BLD AUTO: 68 % (ref 43–75)
NRBC BLD AUTO-RTO: 0 /100 WBCS
PLATELET # BLD AUTO: 230 THOUSANDS/UL (ref 149–390)
PMV BLD AUTO: 10.2 FL (ref 8.9–12.7)
POTASSIUM SERPL-SCNC: 3.8 MMOL/L (ref 3.5–5.3)
RBC # BLD AUTO: 3.39 MILLION/UL (ref 3.81–5.12)
SODIUM SERPL-SCNC: 138 MMOL/L (ref 135–147)
WBC # BLD AUTO: 10.06 THOUSAND/UL (ref 4.31–10.16)

## 2025-01-04 PROCEDURE — 99232 SBSQ HOSP IP/OBS MODERATE 35: CPT | Performed by: STUDENT IN AN ORGANIZED HEALTH CARE EDUCATION/TRAINING PROGRAM

## 2025-01-04 PROCEDURE — 80048 BASIC METABOLIC PNL TOTAL CA: CPT | Performed by: STUDENT IN AN ORGANIZED HEALTH CARE EDUCATION/TRAINING PROGRAM

## 2025-01-04 PROCEDURE — 83735 ASSAY OF MAGNESIUM: CPT | Performed by: STUDENT IN AN ORGANIZED HEALTH CARE EDUCATION/TRAINING PROGRAM

## 2025-01-04 PROCEDURE — 85025 COMPLETE CBC W/AUTO DIFF WBC: CPT | Performed by: STUDENT IN AN ORGANIZED HEALTH CARE EDUCATION/TRAINING PROGRAM

## 2025-01-04 RX ADMIN — PIMAVANSERIN TARTRATE 34 MG: 34 CAPSULE ORAL at 22:30

## 2025-01-04 RX ADMIN — LEVOTHYROXINE SODIUM 50 MCG: 50 TABLET ORAL at 05:38

## 2025-01-04 RX ADMIN — NYSTATIN: 100000 POWDER TOPICAL at 17:00

## 2025-01-04 RX ADMIN — CARBIDOPA AND LEVODOPA 1 TABLET: 25; 100 TABLET ORAL at 05:38

## 2025-01-04 RX ADMIN — NYSTATIN: 100000 POWDER TOPICAL at 09:18

## 2025-01-04 RX ADMIN — CARBIDOPA AND LEVODOPA 1 TABLET: 25; 100 TABLET ORAL at 16:58

## 2025-01-04 RX ADMIN — CARBIDOPA AND LEVODOPA 1 TABLET: 25; 100 TABLET ORAL at 22:30

## 2025-01-04 RX ADMIN — GUAIFENESIN 600 MG: 600 TABLET ORAL at 22:29

## 2025-01-04 RX ADMIN — CEFTRIAXONE 1000 MG: 1 INJECTION, SOLUTION INTRAVENOUS at 15:24

## 2025-01-04 RX ADMIN — ASPIRIN 81 MG 81 MG: 81 TABLET ORAL at 09:18

## 2025-01-04 RX ADMIN — GUAIFENESIN 600 MG: 600 TABLET ORAL at 09:18

## 2025-01-04 RX ADMIN — PRAMIPEXOLE DIHYDROCHLORIDE 0.12 MG: 0.25 TABLET ORAL at 22:29

## 2025-01-04 RX ADMIN — PANTOPRAZOLE SODIUM 20 MG: 20 TABLET, DELAYED RELEASE ORAL at 09:18

## 2025-01-04 RX ADMIN — METOPROLOL TARTRATE 25 MG: 25 TABLET, FILM COATED ORAL at 09:18

## 2025-01-04 RX ADMIN — Medication 1000 UNITS: at 09:18

## 2025-01-04 RX ADMIN — PRAMIPEXOLE DIHYDROCHLORIDE 0.12 MG: 0.25 TABLET ORAL at 15:25

## 2025-01-04 RX ADMIN — PRAMIPEXOLE DIHYDROCHLORIDE 0.12 MG: 0.25 TABLET ORAL at 05:38

## 2025-01-04 RX ADMIN — ENOXAPARIN SODIUM 40 MG: 40 INJECTION SUBCUTANEOUS at 09:18

## 2025-01-04 RX ADMIN — EZETIMIBE 10 MG: 10 TABLET ORAL at 22:30

## 2025-01-04 RX ADMIN — CARBIDOPA AND LEVODOPA 1 TABLET: 25; 100 TABLET ORAL at 11:34

## 2025-01-04 NOTE — PROGRESS NOTES
Progress Note - Hospitalist   Name: Ankita Turpin 74 y.o. female I MRN: [id number]  Unit/Bed#: -Ceci I Date of Admission: 1/2/2025   Date of Service: 1/4/2025 I Hospital Day: 1    Assessment & Plan  Generalized weakness  Presented from home with  due to increased weakness, unable to ambulate  History of Parkinson  Found to have UTI and suspected pneumonia  PT/OT: recommend STR, patient and her  are agreeable  Acquired hypothyroidism  Continue PTA levothyroxine  TSH within normal limits  Parkinson's disease (HCC)  Follows with [facility] neurology  Continue PTA Sinemet, Mirapex, pimavanserin tartrate  UTI (urinary tract infection)  UA with pyuria, bacteriuria  Urine culture growing E. coli, susceptible to ceftriaxone (day 3/5)  Continue ceftriaxone  Pneumonia  Reports coughing after ingesting food and fluids  CT chest abdomen pelvis suspicious for pneumonia right lung base  Check chest x-ray  Empiric ceftriaxone  Primary hypertension  Continue PTA metoprolol  Trend blood pressures    VTE Pharmacologic Prophylaxis:   High Risk (Score >/= 5) - Pharmacological DVT Prophylaxis Ordered: enoxaparin (Lovenox). Sequential Compression Devices Ordered.    Mobility:   Basic Mobility Inpatient Raw Score: 11  JH-HLM Goal: 4: Move to chair/commode  JH-HLM Achieved: 4: Move to chair/commode  JH-HLM Goal achieved. Continue to encourage appropriate mobility.    Patient Centered Rounds: I performed bedside rounds with nursing staff today.   Discussions with Specialists or Other Care Team Provider: CM    Education and Discussions with Family / Patient: Updated  () at bedside.    Current Length of Stay: 1 day(s)  Current Patient Status: Inpatient   Certification Statement: The patient will continue to require additional inpatient hospital stay due to pending rehab placement  Discharge Plan: Anticipate discharge in 48-72 hrs to rehab facility.    Code Status: Level 1 - Full Code    Subjective   Patient  seen and examined at bedside.  No acute events overnight.  Patient reports improvement in urinary symptoms, and weakness.  No other acute complaints at this time.    Objective :  Temp:  [97.2 °F (36.2 °C)-97.7 °F (36.5 °C)] 97.7 °F (36.5 °C)  HR:  [58-80] 58  BP: (105-135)/(53-59) 121/53  Resp:  [18] 18  SpO2:  [96 %] 96 %  O2 Device: None (Room air)    Body mass index is 38.51 kg/m².     Input and Output Summary (last 24 hours):     Intake/Output Summary (Last 24 hours) at 1/4/2025 0844  Last data filed at 1/4/2025 0544  Gross per 24 hour   Intake 900 ml   Output --   Net 900 ml       Physical Exam  Constitutional:       General: She is not in acute distress.     Appearance: Normal appearance. She is not toxic-appearing.   HENT:      Head: Normocephalic and atraumatic.   Eyes:      Extraocular Movements: Extraocular movements intact.      Pupils: Pupils are equal, round, and reactive to light.   Cardiovascular:      Rate and Rhythm: Normal rate and regular rhythm.   Pulmonary:      Effort: Pulmonary effort is normal.      Breath sounds: Normal breath sounds.   Skin:     General: Skin is warm.   Neurological:      Mental Status: She is alert and oriented to person, place, and time. Mental status is at baseline.      Comments: Parkinsonian movements    Psychiatric:         Mood and Affect: Mood normal.         Behavior: Behavior normal.        Lines/Drains:              Lab Results: I have reviewed the following results:   Results from last 7 days   Lab Units 01/04/25  0545   WBC Thousand/uL 10.06   HEMOGLOBIN g/dL 9.8*   HEMATOCRIT % 30.9*   PLATELETS Thousands/uL 230   SEGS PCT % 68   LYMPHO PCT % 19   MONO PCT % 9   EOS PCT % 3     Results from last 7 days   Lab Units 01/04/25  0545 01/03/25  0504 01/02/25  1456   SODIUM mmol/L 138   < > 137   POTASSIUM mmol/L 3.8   < > 4.1   CHLORIDE mmol/L 106   < > 103   CO2 mmol/L 25   < > 28   BUN mg/dL 22   < > 21   CREATININE mg/dL 0.66   < > 0.72   ANION GAP mmol/L 7   < >  6   CALCIUM mg/dL 8.1*   < > 8.3*   ALBUMIN g/dL  --   --  4.1   TOTAL BILIRUBIN mg/dL  --   --  0.61   ALK PHOS U/L  --   --  84   ALT U/L  --   --  20   AST U/L  --   --  18   GLUCOSE RANDOM mg/dL 97   < > 118    < > = values in this interval not displayed.                 Results from last 7 days   Lab Units 01/03/25  0504 01/02/25  2108   LACTIC ACID mmol/L  --  0.9   PROCALCITONIN ng/ml 0.10 0.08       Recent Cultures (last 7 days):   Results from last 7 days   Lab Units 01/02/25  1456   URINE CULTURE  >100,000 cfu/ml Gram Negative Markell Enteric Like*             Last 24 Hours Medication List:     Current Facility-Administered Medications:     acetaminophen (TYLENOL) tablet 650 mg, Q6H PRN    Artificial Tears Op Soln 1 drop, Q4H PRN    aspirin chewable tablet 81 mg, Daily    carbidopa-levodopa (SINEMET)  mg per tablet 1 tablet, 4x Daily    cefTRIAXone (ROCEPHIN) IVPB (premix in dextrose) 1,000 mg 50 mL, Q24H, Last Rate: 1,000 mg (01/03/25 1426)    Cholecalciferol (VITAMIN D3) tablet 1,000 Units, Daily    enoxaparin (LOVENOX) subcutaneous injection 40 mg, Daily    ezetimibe (ZETIA) tablet 10 mg, HS    guaiFENesin (MUCINEX) 12 hr tablet 600 mg, Q12H JAMES    levothyroxine tablet 50 mcg, Early Morning    metoprolol tartrate (LOPRESSOR) tablet 25 mg, Daily    nystatin (MYCOSTATIN) powder, BID    pantoprazole (PROTONIX) EC tablet 20 mg, Daily    Pimavanserin Tartrate CAPS 34 mg, HS    pramipexole (MIRAPEX) tablet 0.125 mg, TID    Administrative Statements   Today, Patient Was Seen By: Jennifer Oleary MD      **Please Note: This note may have been constructed using a voice recognition system.**

## 2025-01-04 NOTE — ASSESSMENT & PLAN NOTE
UA with pyuria, bacteriuria  Urine culture growing E. coli, susceptible to ceftriaxone (day 3/5)  Continue ceftriaxone

## 2025-01-04 NOTE — UTILIZATION REVIEW
Continued Stay Review  SEE INITIAL REVIEW COMPLETED ON 1/3 BY JOZEF CHAVEZ RN  Date: 1/4                          Current Patient Class: Inpatient  Current Level of Care: Med/surg    HPI:74 y.o. female initially admitted on 1/3     Assessment/Plan:   Date: 1/4  Day 3: Has surpassed a 2nd midnight with active treatments and services. INitially admitted for generalized weakness with h/o parkinsons.  PT/OT recommending STR.  Case management working on safe dc plan.         Medications:   Scheduled Medications:  aspirin, 81 mg, Oral, Daily  carbidopa-levodopa, 1 tablet, Oral, 4x Daily  cefTRIAXone, 1,000 mg, Intravenous, Q24H  cholecalciferol, 1,000 Units, Oral, Daily  enoxaparin, 40 mg, Subcutaneous, Daily  ezetimibe, 10 mg, Oral, HS  guaiFENesin, 600 mg, Oral, Q12H JAMES  levothyroxine, 50 mcg, Oral, Early Morning  metoprolol tartrate, 25 mg, Oral, Daily  nystatin, , Topical, BID  pantoprazole, 20 mg, Oral, Daily  Pimavanserin Tartrate, 34 mg, Oral, HS  pramipexole, 0.125 mg, Oral, TID      Continuous IV Infusions:     PRN Meds:  acetaminophen, 650 mg, Oral, Q6H PRN  Artificial Tears, 1 drop, Both Eyes, Q4H PRN      Discharge Plan: TBD    Vital Signs (last 3 days)       Date/Time Temp Pulse Resp BP MAP (mmHg) SpO2 O2 Device Patient Position - Orthostatic VS Pain    01/04/25 0915 -- -- -- -- -- -- -- -- No Pain    01/04/25 07:46:03 97.7 °F (36.5 °C) 58 18 121/53 76 96 % -- -- --    01/03/25 2137 -- -- -- -- -- -- -- -- 5    01/03/25 2024 -- -- -- -- -- -- None (Room air) -- No Pain    01/03/25 16:20:55 97.2 °F (36.2 °C) 80 18 135/59 84 96 % None (Room air) Lying --    01/03/25 1015 -- -- -- -- -- -- -- -- No Pain    01/03/25 0917 -- 80 -- 105/56 -- -- -- -- --    01/03/25 0723 -- -- -- -- -- 95 % None (Room air) -- 2    01/03/25 07:15:16 97.3 °F (36.3 °C) 79 18 118/48 71 92 % -- -- --    01/02/25 22:49:31 97.2 °F (36.2 °C) 69 19 121/69 86 94 % -- -- --    01/02/25 19:41:38 97.3 °F (36.3 °C) 83 -- 133/82 99 95 % -- -- --     01/02/25 1941 -- -- -- -- -- -- None (Room air) -- No Pain    01/02/25 19:40:47 97.3 °F (36.3 °C) -- -- 133/82 99 -- -- -- --    01/02/25 1900 -- 74 20 150/83 111 96 % None (Room air) Sitting --    01/02/25 1635 -- 70 20 170/69 99 99 % -- -- --    01/02/25 1500 -- 69 19 149/63 99 98 % -- -- --    01/02/25 1458 -- -- -- -- -- -- None (Room air) -- --    01/02/25 1440 100.4 °F (38 °C) -- -- -- -- -- -- -- --    01/02/25 1436 -- 74 16 178/81 -- 96 % None (Room air) Sitting No Pain          Weight (last 2 days)       Date/Time Weight    01/02/25 1436 95.5 (210.54)            Pertinent Labs/Diagnostic Results:   Radiology:  XR chest portable   Final Interpretation by Rome Manley MD (01/03 1028)      Mild right basilar atelectasis.            Resident: Bren Dover I, the attending radiologist, have reviewed the images and agree with the final report above.      Workstation performed: [id number]         CT abdomen pelvis with contrast   Final Interpretation by Galdino Simon MD (01/02 1645)      Asymmetric airspace opacity at the right lung base is suspicious for developing pneumonia.      No evidence of hydronephrosis or obvious calculi on this contrast examination.      Slight haziness of the perivesicular fat could be related to urinary tract infection.      The study was marked in EPIC for immediate notification.         Workstation performed: [id number]           Cardiology:  ECG 12 lead   Final Result by Lester Bull MD (01/02 2714)   Normal sinus rhythm   Left axis deviation   Moderate voltage criteria for LVH, may be normal variant ( R in aVL ,    Parker product )   ST & T wave abnormality, consider lateral ischemia   Abnormal ECG   When compared with ECG of 31-Oct-2022 08:55,   QT has shortened   Confirmed by Lester Bull ([id number]) on 1/2/2025 2:59:26 PM              Results from last 7 days   Lab Units 01/02/25  1743   SARS-COV-2  Negative     Results from last 7 days   Lab Units  01/04/25  0545 01/03/25  0504 01/02/25  1456   WBC Thousand/uL 10.06 14.01* 16.55*   HEMOGLOBIN g/dL 9.8* 9.8* 11.2*   HEMATOCRIT % 30.9* 31.1* 35.5   PLATELETS Thousands/uL 230 224 258   TOTAL NEUT ABS Thousands/µL 6.89  --  14.41*         Results from last 7 days   Lab Units 01/04/25  0545 01/03/25  0504 01/02/25  1456   SODIUM mmol/L 138 138 137   POTASSIUM mmol/L 3.8 3.5 4.1   CHLORIDE mmol/L 106 106 103   CO2 mmol/L 25 26 28   ANION GAP mmol/L 7 6 6   BUN mg/dL 22 17 21   CREATININE mg/dL 0.66 0.67 0.72   EGFR ml/min/1.73sq m 87 86 82   CALCIUM mg/dL 8.1* 8.0* 8.3*   MAGNESIUM mg/dL 2.1 1.9  --      Results from last 7 days   Lab Units 01/02/25  1456   AST U/L 18   ALT U/L 20   ALK PHOS U/L 84   TOTAL PROTEIN g/dL 6.7   ALBUMIN g/dL 4.1   TOTAL BILIRUBIN mg/dL 0.61         Results from last 7 days   Lab Units 01/04/25  0545 01/03/25  0504 01/02/25  1456   GLUCOSE RANDOM mg/dL 97 98 118         Results from last 7 days   Lab Units 01/03/25  0504   TSH 3RD GENERATON uIU/mL 1.910     Results from last 7 days   Lab Units 01/03/25  0504 01/02/25  2108   PROCALCITONIN ng/ml 0.10 0.08     Results from last 7 days   Lab Units 01/02/25  2108   LACTIC ACID mmol/L 0.9           Results from last 7 days   Lab Units 01/02/25  1456   LIPASE u/L 12       Results from last 7 days   Lab Units 01/02/25  1456   CLARITY UA  Clear   COLOR UA  Yellow   SPEC GRAV UA  1.020   PH UA  6.5   GLUCOSE UA mg/dl Negative   KETONES UA mg/dl Negative   BLOOD UA  Small*   PROTEIN UA mg/dl Negative   NITRITE UA  Negative   BILIRUBIN UA  Negative   UROBILINOGEN UA E.U./dl 0.2   LEUKOCYTES UA  Trace*   WBC UA /hpf 10-20*   RBC UA /hpf 1-2   BACTERIA UA /hpf Innumerable*   EPITHELIAL CELLS WET PREP /hpf Occasional     Results from last 7 days   Lab Units 01/02/25  1743   INFLUENZA A PCR  Negative   INFLUENZA B PCR  Negative   RSV PCR  Negative                             Results from last 7 days   Lab Units 01/02/25  1456   URINE CULTURE  >100,000  cfu/ml Gram Negative Markell Enteric Like*                   Network Utilization Review Department  ATTENTION: Please call with any questions or concerns to [phone] and carefully listen to the prompts so that you are directed to the right person. All voicemails are confidential.   For Discharge needs, contact Care Management DC Support Team at [phone] opt. [phone]  Send all requests for admission clinical reviews, approved or denied determinations and any other requests to dedicated fax number below belonging to the campus where the patient is receiving treatment. List of dedicated fax numbers for the Facilities:  FACILITY NAME UR FAX NUMBER   ADMISSION DENIALS (Administrative/Medical Necessity) [phone]   DISCHARGE SUPPORT TEAM (NETWORK) [phone]   PARENT CHILD HEALTH (Maternity/NICU/Pediatrics) [phone]   [facility] [phone]   [facility] [phone]   [facility] [phone]   [facility] [phone]   [facility] [phone]   [facility] [phone]   [facility] [phone]   [facility] [phone]   [facility] [phone]   [facility] [phone]   [facility] [phone]   [facility] [phone]

## 2025-01-04 NOTE — PLAN OF CARE
Problem: Prexisting or High Potential for Compromised Skin Integrity  Goal: Skin integrity is maintained or improved  Description: INTERVENTIONS:  - Identify patients at risk for skin breakdown  - Assess and monitor skin integrity  - Assess and monitor nutrition and hydration status  - Monitor labs   - Assess for incontinence   - Turn and reposition patient  - Assist with mobility/ambulation  - Relieve pressure over bony prominences  - Avoid friction and shearing  - Provide appropriate hygiene as needed including keeping skin clean and dry  - Evaluate need for skin moisturizer/barrier cream  - Collaborate with interdisciplinary team   - Patient/family teaching  - Consider wound care consult   Outcome: Progressing     Problem: PAIN - ADULT  Goal: Verbalizes/displays adequate comfort level or baseline comfort level  Description: Interventions:  - Encourage patient to monitor pain and request assistance  - Assess pain using appropriate pain scale  - Administer analgesics based on type and severity of pain and evaluate response  - Implement non-pharmacological measures as appropriate and evaluate response  - Consider cultural and social influences on pain and pain management  - Notify physician/advanced practitioner if interventions unsuccessful or patient reports new pain  Outcome: Progressing     Problem: INFECTION - ADULT  Goal: Absence or prevention of progression during hospitalization  Description: INTERVENTIONS:  - Assess and monitor for signs and symptoms of infection  - Monitor lab/diagnostic results  - Monitor all insertion sites, i.e. indwelling lines, tubes, and drains  - Monitor endotracheal if appropriate and nasal secretions for changes in amount and color  - [place] appropriate cooling/warming therapies per order  - Administer medications as ordered  - Instruct and encourage patient and family to use good hand hygiene technique  - Identify and instruct in appropriate isolation precautions for  identified infection/condition  Outcome: Progressing  Goal: Absence of fever/infection during neutropenic period  Description: INTERVENTIONS:  - Monitor WBC    Outcome: Progressing     Problem: SAFETY ADULT  Goal: Patient will remain free of falls  Description: INTERVENTIONS:  - Educate patient/family on patient safety including physical limitations  - Instruct patient to call for assistance with activity   - Consult OT/PT to assist with strengthening/mobility   - Keep Call bell within reach  - Keep bed low and locked with side rails adjusted as appropriate  - Keep care items and personal belongings within reach  - Initiate and maintain comfort rounds  - Make Fall Risk Sign visible to staff  - Offer Toileting every 2 Hours, in advance of need  - Initiate/Maintain bed alarm  - Obtain necessary fall risk management equipment  - Apply yellow socks and bracelet for high fall risk patients  - Consider moving patient to room near nurses station  Outcome: Progressing  Goal: Maintain or return to baseline ADL function  Description: INTERVENTIONS:  -  Assess patient's ability to carry out ADLs; assess patient's baseline for ADL function and identify physical deficits which impact ability to perform ADLs (bathing, care of mouth/teeth, toileting, grooming, dressing, etc.)  - Assess/evaluate cause of self-care deficits   - Assess range of motion  - Assess patient's mobility; develop plan if impaired  - Assess patient's need for assistive devices and provide as appropriate  - Encourage maximum independence but intervene and supervise when necessary  - Involve family in performance of ADLs  - Assess for home care needs following discharge   - Consider OT consult to assist with ADL evaluation and planning for discharge  - Provide patient education as appropriate  Outcome: Progressing  Goal: Maintains/Returns to pre admission functional level  Description: INTERVENTIONS:  - Perform AM-PAC 6 Click Basic Mobility/ Daily Activity  assessment daily.  - Set and communicate daily mobility goal to care team and patient/family/caregiver.   - Collaborate with rehabilitation services on mobility goals if consulted  - Reposition patient every 2 hours.  - Stand patient 3 times a day  - Ambulate patient 3 times a day  - Out of bed to chair 3 times a day   - Out of bed for meals 3 times a day  - Out of bed for toileting  - Record patient progress and toleration of activity level   Outcome: Progressing     Problem: DISCHARGE PLANNING  Goal: Discharge to home or other facility with appropriate resources  Description: INTERVENTIONS:  - Identify barriers to discharge w/patient and caregiver  - Arrange for needed discharge resources and transportation as appropriate  - Identify discharge learning needs (meds, wound care, etc.)  - Arrange for interpretive services to assist at discharge as needed  - Refer to Case Management Department for coordinating discharge planning if the patient needs post-hospital services based on physician/advanced practitioner order or complex needs related to functional status, cognitive ability, or social support system  Outcome: Progressing     Problem: Knowledge Deficit  Goal: Patient/family/caregiver demonstrates understanding of disease process, treatment plan, medications, and discharge instructions  Description: Complete learning assessment and assess knowledge base.  Interventions:  - Provide teaching at level of understanding  - Provide teaching via preferred learning methods  Outcome: Progressing

## 2025-01-05 VITALS
RESPIRATION RATE: 18 BRPM | OXYGEN SATURATION: 95 % | WEIGHT: 210.54 LBS | DIASTOLIC BLOOD PRESSURE: 63 MMHG | BODY MASS INDEX: 38.51 KG/M2 | TEMPERATURE: 97.3 F | HEART RATE: 60 BPM | SYSTOLIC BLOOD PRESSURE: 124 MMHG

## 2025-01-05 PROCEDURE — 97116 GAIT TRAINING THERAPY: CPT

## 2025-01-05 PROCEDURE — 99239 HOSP IP/OBS DSCHRG MGMT >30: CPT | Performed by: STUDENT IN AN ORGANIZED HEALTH CARE EDUCATION/TRAINING PROGRAM

## 2025-01-05 RX ORDER — CEFPODOXIME PROXETIL 200 MG/1
200 TABLET, FILM COATED ORAL 2 TIMES DAILY
Qty: 2 TABLET | Refills: 0 | Status: SHIPPED | OUTPATIENT
Start: 2025-01-06 | End: 2025-01-07

## 2025-01-05 RX ADMIN — PANTOPRAZOLE SODIUM 20 MG: 20 TABLET, DELAYED RELEASE ORAL at 08:01

## 2025-01-05 RX ADMIN — ASPIRIN 81 MG 81 MG: 81 TABLET ORAL at 08:01

## 2025-01-05 RX ADMIN — GUAIFENESIN 600 MG: 600 TABLET ORAL at 08:01

## 2025-01-05 RX ADMIN — LEVOTHYROXINE SODIUM 50 MCG: 50 TABLET ORAL at 05:05

## 2025-01-05 RX ADMIN — CARBIDOPA AND LEVODOPA 1 TABLET: 25; 100 TABLET ORAL at 11:38

## 2025-01-05 RX ADMIN — NYSTATIN: 100000 POWDER TOPICAL at 08:04

## 2025-01-05 RX ADMIN — PRAMIPEXOLE DIHYDROCHLORIDE 0.12 MG: 0.25 TABLET ORAL at 14:26

## 2025-01-05 RX ADMIN — PRAMIPEXOLE DIHYDROCHLORIDE 0.12 MG: 0.25 TABLET ORAL at 05:05

## 2025-01-05 RX ADMIN — ENOXAPARIN SODIUM 40 MG: 40 INJECTION SUBCUTANEOUS at 08:01

## 2025-01-05 RX ADMIN — CEFTRIAXONE 1000 MG: 1 INJECTION, SOLUTION INTRAVENOUS at 14:26

## 2025-01-05 RX ADMIN — METOPROLOL TARTRATE 25 MG: 25 TABLET, FILM COATED ORAL at 08:01

## 2025-01-05 RX ADMIN — Medication 1000 UNITS: at 08:01

## 2025-01-05 RX ADMIN — CARBIDOPA AND LEVODOPA 1 TABLET: 25; 100 TABLET ORAL at 05:05

## 2025-01-05 NOTE — PHYSICAL THERAPY NOTE
PHYSICAL THERAPY TREATMENT NOTE  NAME:  Ankita Turpin  DATE: 01/05/25    Length Of Stay: 2  Performed at least 2 patient identifiers during session: Name and Birthday  Treatment time: 1767-1690  Treatment length: 15 min       01/05/25 1143   Note Type   Note Type Treatment   Pain Assessment   Pain Assessment Tool 0-10   Pain Score No Pain   Restrictions/Precautions   Other Precautions Chair Alarm;Bed Alarm;Fall Risk   General   Chart Reviewed Yes   Response to Previous Treatment Patient with no complaints from previous session.   Cognition   Overall Cognitive Status WFL   Orientation Level Oriented X4   Following Commands Follows one step commands without difficulty   Five Times Sit To Stand   Time (Seconds) 11.3 Seconds   Bed Mobility   Additional Comments Pt seated OOB in recliner at start and end of session; bed mobility not assessed this date   Transfers   Sit to Stand 5  Supervision   Additional items Increased time required;Verbal cues   Stand to Sit 5  Supervision   Additional items Increased time required;Verbal cues   Stand pivot 5  Supervision   Additional items Increased time required;Verbal cues   Additional Comments RW for transfers, pt initially requiring several attempts to obtain standing from seated, later in session able to stand on first attempt   Ambulation/Elevation   Gait pattern Improper Weight shift;Decreased foot clearance;Shuffling;Short stride;Excessively slow   Gait Assistance 5  Supervision   Additional items Verbal cues   Assistive Device Rolling walker   Distance 150' with RW and supervision and VC for increased step height as pt with decreased foot clearance, pt limited by fatigue, no LOB   Balance   Static Sitting Good   Dynamic Sitting Fair +   Static Standing Fair   Dynamic Standing Fair -   Ambulatory Fair -   Endurance Deficit   Endurance Deficit Yes   Endurance Deficit Description fatigue   Activity Tolerance   Activity Tolerance Patient limited by fatigue   Medical Staff  Made Aware Dr Oleary, SWETA Arias, PCA, Payge   Assessment   Prognosis Good   Problem List Decreased strength;Decreased endurance;Impaired balance;Decreased mobility;Decreased safety awareness;Obesity   Goals   PT Treatment Day 2   Plan   Treatment/Interventions Functional transfer training;LE strengthening/ROM;Therapeutic exercise;Elevations;Endurance training;Patient/family training;Equipment eval/education;Bed mobility;Gait training;Compensatory technique education;Spoke to nursing;Spoke to case management;Spoke to MD   Progress Progressing toward goals   PT Frequency 3-5x/wk   Discharge Recommendation   Rehab Resource Intensity Level, PT III (Minimum Resource Intensity)   AM-PAC Basic Mobility Inpatient   Turning in Flat Bed Without Bedrails 4   Lying on Back to Sitting on Edge of Flat Bed Without Bedrails 3   Moving Bed to Chair 3   Standing Up From Chair Using Arms 3   Walk in Room 3   Climb 3-5 Stairs With Railing 2   Basic Mobility Inpatient Raw Score 18   Basic Mobility Standardized Score 41.05   [facility] Highest Level Of Mobility   -HLM Goal 6: Walk 10 steps or more   JH-HLM Achieved 7: Walk 25 feet or more   Education   Education Provided Mobility training;Assistive device   Patient Demonstrates acceptance/verbal understanding   End of Consult   Patient Position at End of Consult Bedside chair;Bed/Chair alarm activated;All needs within reach     The patient's AM-PAC Basic Mobility Inpatient Short Form Raw Score is 18. A Raw score of less than or equal to 16 suggests the patient may benefit from discharge to post-acute rehabilitation services. Please also refer to the recommendation of the Physical Therapist for safe discharge planning.      Assessment: Pt seen for PT treatment session this date with interventions consisting of gait training w/ emphasis on improving pt's ability to ambulate level surfaces x 150' with close S provided by therapist with RW and Therapeutic exercise consisting of: AROM 5  reps B LE in standing with occ UE support position. Pt agreeable to PT treatment session upon arrival, pt found seated OOB in recliner, in no apparent distress. In comparison to previous session, pt with significant improvements in pt able to complete community distance mobility with RW and superision . Post session: pt returned back to recliner, chair alarm engaged, all needs in reach, and RN notified of session findings/recommendations Continue to recommend Level III Resource Intensity at time of d/c in order to maximize pt's functional independence and safety w/ mobility. Pt continues to be functioning below baseline level, and remains limited 2* factors listed above and including decreased strength, balance, mobility, and activity tolerance. PT will continue to see pt while here in order to address the deficits listed above and provide interventions consistent w/ POC in effort to achieve STGs.     Hailey Lares, PT,DPT

## 2025-01-05 NOTE — CASE MANAGEMENT
Case Management Discharge Planning Note    Patient name Ankita Turpin  Location /-01 MRN [id number]  : 1950 Date 2025       Current Admission Date: 2025  Current Admission Diagnosis:Generalized weakness   Patient Active Problem List    Diagnosis Date Noted Date Diagnosed    Generalized weakness 2025     UTI (urinary tract infection) 2025     Pneumonia 2025     Positive blood culture 11/15/2023     Transaminitis 11/15/2023     Morbid obesity due to excess calories (HCC) 2022     Frequent urination 2022     Fall down steps 10/30/2022     Closed fracture dislocation of left ankle 10/30/2022     Parkinson's disease (HCC) 10/30/2022     Primary hypertension 10/30/2022     Gastro-esophageal reflux disease without esophagitis 01/10/2022     Chronic obstructive pulmonary disease (HCC) 2021     Acquired hypothyroidism 2015       LOS (days): 2  Geometric Mean LOS (GMLOS) (days):   Days to GMLOS:     OBJECTIVE:  Risk of Unplanned Readmission Score: 15.04         Current admission status: Inpatient   Preferred Pharmacy:   [id number] - [place], [place] - [address]  [address]  [place] [address]  Phone: [phone] Fax: [phone]    Primary Care Provider: Jose Daly DO    Primary Insurance: [employer] REP  Secondary Insurance:     DISCHARGE DETAILS:        CM uploaded AVS and Script via [facility] for [facility].

## 2025-01-05 NOTE — CASE MANAGEMENT
Case Management Discharge Planning Note    Patient name Ankita Turpin  Location /-01 MRN [id number]  : 1950 Date 2025       Current Admission Date: 2025  Current Admission Diagnosis:Generalized weakness   Patient Active Problem List    Diagnosis Date Noted Date Diagnosed    Generalized weakness 2025     UTI (urinary tract infection) 2025     Pneumonia 2025     Positive blood culture 11/15/2023     Transaminitis 11/15/2023     Morbid obesity due to excess calories (HCC) 2022     Frequent urination 2022     Fall down steps 10/30/2022     Closed fracture dislocation of left ankle 10/30/2022     Parkinson's disease (HCC) 10/30/2022     Primary hypertension 10/30/2022     Gastro-esophageal reflux disease without esophagitis 01/10/2022     Chronic obstructive pulmonary disease (HCC) 2021     Acquired hypothyroidism 2015       LOS (days): 2  Geometric Mean LOS (GMLOS) (days):   Days to GMLOS:     OBJECTIVE:  Risk of Unplanned Readmission Score: 15.01         Current admission status: Inpatient   Preferred Pharmacy:   [id number] - [place] - [address]  [address]  [place] [address]  Phone: [phone] Fax: [phone]    Primary Care Provider: Jose Daly DO    Primary Insurance: [employer] REP  Secondary Insurance:     DISCHARGE DETAILS:    Discharge planning discussed with:: Patient, daughter  Freedom of Choice: Yes  Comments - Freedom of Choice: [facility]  CM contacted family/caregiver?: Yes  Were Treatment Team discharge recommendations reviewed with patient/caregiver?: Yes  Did patient/caregiver verbalize understanding of patient care needs?: Yes  Were patient/caregiver advised of the risks associated with not following Treatment Team discharge recommendations?: Yes    Contacts  Patient Contacts: daughter  Relationship to Patient:: Family  Contact Method: In Person  Reason/Outcome:  Discharge Planning    Requested Home Health Care         Is the patient interested in HHC at discharge?: Yes  Home Health Discipline requested:: Nursing, Occupational Therapy, Physical Therapy  Home Health Agency Name:: [facility]  HHA External Referral Reason (only applicable if external HHA name selected): Services not provided in network or near patient location  Home Health Follow-Up Provider:: [facility]  Home Health Services Needed:: Evaluate Functional Status and Safety, Gait/ADL Training, Strengthening/Theraputic Exercises to Improve Function  Homebound Criteria Met:: Requires the Assistance of Another Person for Safe Ambulation or to Leave the Home, Uses an Assist Device (i.e. cane, walker, etc)  Supporting Clincal Findings:: Limited Endurance, Fatigues Easliy in Short Distances    DME Referral Provided  Referral made for DME?: No    Other Referral/Resources/Interventions Provided:  Interventions: HHC  Referral Comments: [facility]    Would you like to participate in our [facility] service program?  : No - Declined    Treatment Team Recommendation: Home with Home Health Care  Discharge Destination Plan:: Home with Home Health Care  Transport at Discharge : Family            Cm met with paitent and daughter regarding improvements in mobility and new therapy recommendation of HHC.   A post acute care recommendation was made by your care team for HHC.  Discussed Freedom of Choice with patient. List of agencies given to patient via in person. patient aware the list is custom filtered for them by preference  and that [facility] post acute providers are designated. [facility] is choice.   Patient has last abx around 230pm - spouse will be in around that time to pick patient up for discharge.     CM to follow patient's care and discharge needs.

## 2025-01-05 NOTE — ASSESSMENT & PLAN NOTE
Reports coughing after ingesting food and fluids  CT chest abdomen pelvis suspicious for pneumonia right lung base  Check chest x-ray  Patient received 4 days of Rocephin, and is being discharged on 1 day of Vantin for total of 5 days of therapy.

## 2025-01-05 NOTE — PLAN OF CARE
Problem: Prexisting or High Potential for Compromised Skin Integrity  Goal: Skin integrity is maintained or improved  Description: INTERVENTIONS:  - Identify patients at risk for skin breakdown  - Assess and monitor skin integrity  - Assess and monitor nutrition and hydration status  - Monitor labs   - Assess for incontinence   - Turn and reposition patient  - Assist with mobility/ambulation  - Relieve pressure over bony prominences  - Avoid friction and shearing  - Provide appropriate hygiene as needed including keeping skin clean and dry  - Evaluate need for skin moisturizer/barrier cream  - Collaborate with interdisciplinary team   - Patient/family teaching  - Consider wound care consult   Outcome: Progressing     Problem: PAIN - ADULT  Goal: Verbalizes/displays adequate comfort level or baseline comfort level  Description: Interventions:  - Encourage patient to monitor pain and request assistance  - Assess pain using appropriate pain scale  - Administer analgesics based on type and severity of pain and evaluate response  - Implement non-pharmacological measures as appropriate and evaluate response  - Consider cultural and social influences on pain and pain management  - Notify physician/advanced practitioner if interventions unsuccessful or patient reports new pain  Outcome: Progressing     Problem: INFECTION - ADULT  Goal: Absence or prevention of progression during hospitalization  Description: INTERVENTIONS:  - Assess and monitor for signs and symptoms of infection  - Monitor lab/diagnostic results  - Monitor all insertion sites, i.e. indwelling lines, tubes, and drains  - Monitor endotracheal if appropriate and nasal secretions for changes in amount and color  - [place] appropriate cooling/warming therapies per order  - Administer medications as ordered  - Instruct and encourage patient and family to use good hand hygiene technique  - Identify and instruct in appropriate isolation precautions for  identified infection/condition  Outcome: Progressing  Goal: Absence of fever/infection during neutropenic period  Description: INTERVENTIONS:  - Monitor WBC    Outcome: Progressing     Problem: SAFETY ADULT  Goal: Patient will remain free of falls  Description: INTERVENTIONS:  - Educate patient/family on patient safety including physical limitations  - Instruct patient to call for assistance with activity   - Consult OT/PT to assist with strengthening/mobility   - Keep Call bell within reach  - Keep bed low and locked with side rails adjusted as appropriate  - Keep care items and personal belongings within reach  - Initiate and maintain comfort rounds  - Make Fall Risk Sign visible to staff  - Offer Toileting every 2 Hours, in advance of need  - Initiate/Maintain bed alarm  - Obtain necessary fall risk management equipment  - Apply yellow socks and bracelet for high fall risk patients  - Consider moving patient to room near nurses station  Outcome: Progressing  Goal: Maintain or return to baseline ADL function  Description: INTERVENTIONS:  -  Assess patient's ability to carry out ADLs; assess patient's baseline for ADL function and identify physical deficits which impact ability to perform ADLs (bathing, care of mouth/teeth, toileting, grooming, dressing, etc.)  - Assess/evaluate cause of self-care deficits   - Assess range of motion  - Assess patient's mobility; develop plan if impaired  - Assess patient's need for assistive devices and provide as appropriate  - Encourage maximum independence but intervene and supervise when necessary  - Involve family in performance of ADLs  - Assess for home care needs following discharge   - Consider OT consult to assist with ADL evaluation and planning for discharge  - Provide patient education as appropriate  Outcome: Progressing  Goal: Maintains/Returns to pre admission functional level  Description: INTERVENTIONS:  - Perform AM-PAC 6 Click Basic Mobility/ Daily Activity  assessment daily.  - Set and communicate daily mobility goal to care team and patient/family/caregiver.   - Collaborate with rehabilitation services on mobility goals if consulted  - Reposition patient every 2 hours.  - Stand patient 3 times a day  - Ambulate patient 3 times a day  - Out of bed to chair 3 times a day   - Out of bed for meals 3 times a day  - Out of bed for toileting  - Record patient progress and toleration of activity level   Outcome: Progressing     Problem: DISCHARGE PLANNING  Goal: Discharge to home or other facility with appropriate resources  Description: INTERVENTIONS:  - Identify barriers to discharge w/patient and caregiver  - Arrange for needed discharge resources and transportation as appropriate  - Identify discharge learning needs (meds, wound care, etc.)  - Arrange for interpretive services to assist at discharge as needed  - Refer to Case Management Department for coordinating discharge planning if the patient needs post-hospital services based on physician/advanced practitioner order or complex needs related to functional status, cognitive ability, or social support system  Outcome: Progressing     Problem: Knowledge Deficit  Goal: Patient/family/caregiver demonstrates understanding of disease process, treatment plan, medications, and discharge instructions  Description: Complete learning assessment and assess knowledge base.  Interventions:  - Provide teaching at level of understanding  - Provide teaching via preferred learning methods  Outcome: Progressing

## 2025-01-05 NOTE — DISCHARGE SUMMARY
Discharge Summary - Hospitalist   Name: Ankita Turpin 74 y.o. female I MRN: [id number]  Unit/Bed#: -01 I Date of Admission: 1/2/2025   Date of Service: 1/5/2025 I Hospital Day: 2     Assessment & Plan  Generalized weakness  Presented from home with  due to increased weakness, unable to ambulate  History of Parkinson  Found to have UTI and suspected pneumonia  PT/OT: Patient was initially seen by PT and OT who recommended rehab placement, to which patient and her  were agreeable.  However as patient's clinical course improved so did her strength, she was reevaluated by PT on 01/5/25 with recommendations for outpatient PT/OT.  Patient was safe to discharge home.  She was seen ambulating to the bathroom and back with a walker and did well.  Acquired hypothyroidism  Continue PTA levothyroxine  TSH within normal limits  Parkinson's disease (HCC)  Follows with [facility] neurology  Continue PTA Sinemet, Mirapex, pimavanserin tartrate  UTI (urinary tract infection)  UA with pyuria, bacteriuria  Urine culture growing E. coli, susceptible to ceftriaxone.  Patient received 4 days of ceftriaxone therapy while hospitalized, discharged on Vantin 200 mg twice daily for 1 days for total 5 days of therapy.  Pneumonia  Reports coughing after ingesting food and fluids  CT chest abdomen pelvis suspicious for pneumonia right lung base  Check chest x-ray  Patient received 4 days of Rocephin, and is being discharged on 1 day of Vantin for total of 5 days of therapy.  Primary hypertension  Continue PTA metoprolol     Medical Problems       Resolved Problems  Date Reviewed: 11/15/2023   None         MESSAGE TO PCP (Jose Daly DO) FOR FOLLOW UP:   Thank you for allowing us to participate in the care of your patient, Ankita Turpin, who was hospitalized from 1/2/2025 through 01/05/25 with the admitting diagnosis of UTI and generalized weakness.  Patient was treated with IV Rocephin for 4 days and discharged home on  Vantin 200 mg twice daily for 1 day for total of 5 days of therapy.  She was initially seen by PT who recommended STR, to which patient and her  were agreeable, however as patient's clinical course improved patient's weakness improved, she was reevaluated by PT who recommended outpatient PT/OT.  Patient was safe for discharge home.    Medication Changes:  Take Vantin 200 mg twice daily x 1 day starting on 01/06/2025  Outpatient testing recommended:  None  If you have any additional questions or would like to discuss further, please feel free to contact me.  Jennifer Oleary MD  [facility] Internal Medicine, Hospitalist, [phone]     Admission Date:   Admission Orders (From admission, onward)       Ordered        01/03/25 1515  INPATIENT ADMISSION  Once            01/02/25 1838  Place in Observation  Once                          Discharge Date: 01/05/25    Consultations During Hospital Stay:  PT/OT    Procedures Performed:   None    Significant Findings / Test Results:   XR chest portable   Final Result by Rome Manley MD (01/03 1028)      Mild right basilar atelectasis.            Resident: Bren Dover I, the attending radiologist, have reviewed the images and agree with the final report above.      Workstation performed: [id number]         CT abdomen pelvis with contrast   Final Result by Galdino Simon MD (01/02 1645)      Asymmetric airspace opacity at the right lung base is suspicious for developing pneumonia.      No evidence of hydronephrosis or obvious calculi on this contrast examination.      Slight haziness of the perivesicular fat could be related to urinary tract infection.      The study was marked in EPIC for immediate notification.         Workstation performed: [id number]               Incidental Findings:   None      Test Results Pending at Discharge (will require follow up):   None     Complications: None    Reason for Admission: Weakness    Hospital Course:   Ankita  Dyana is a 74 y.o. female patient who originally presented to the hospital on 1/2/2025 due to weakness.  Patient presents from home dysuria/urinary frequency with dark foul-smelling urine at home for the past few days and increased weakness.  In the ED patient was unable to ambulate therefore prompting to the hospitalist service for admission.  UA in the ED positive for trace leukocyte with bacteriuria.  Patient was initiated on empiric IV Rocephin, questionable pneumonia on admission, however patient was already on IV Rocephin for UTI and therefore covered for pneumonia.  Vital signs remained stable throughout hospitalization, patient was afebrile, remained on room air, not noted to be hypoxic, leukocytosis resolved.  Patient was seen and evaluated by PT/OT who recommended STR and patient and her  were agreeable, however patient's weakness improved as clinical course improved, she was reevaluated by PT who recommended outpatient PT/OT.  Patient received 4 days of IV Rocephin therapy, discharged home on 1 day of Vantin therapy.  Patient was medically stable for discharge.      Please see above list of diagnoses and related plan for additional information.     Condition at Discharge: stable    Discharge Day Visit / Exam:   Subjective: Patient seen and examined at bedside.  No acute events overnight.  Patient reports that she feels improved compared admission.  She is now ambulating with the help of her walker.  She reports that she would like to be discharged home.  No other acute complaints at this time.  Vitals: Blood Pressure: 124/63 (01/05/25 0725)  Pulse: 60 (01/05/25 0725)  Temperature: (!) 97.3 °F (36.3 °C) (01/05/25 0725)  Temp Source: Temporal (01/03/25 1620)  Respirations: 18 (01/05/25 0725)  Weight - Scale: 95.5 kg (210 lb 8.6 oz) (01/02/25 1436)  SpO2: 95 % (01/05/25 0725)  Physical Exam   Constitutional:       General: She is not in acute distress.     Appearance: Normal appearance. She is not  toxic-appearing.   HENT:      Head: Normocephalic and atraumatic.   Eyes:      Extraocular Movements: Extraocular movements intact.      Pupils: Pupils are equal, round, and reactive to light.   Cardiovascular:      Rate and Rhythm: Normal rate and regular rhythm.   Pulmonary:      Effort: Pulmonary effort is normal.      Breath sounds: Normal breath sounds.   Skin:     General: Skin is warm.   Neurological:      Mental Status: She is alert and oriented to person, place, and time. Mental status is at baseline.      Comments: Parkinsonian movements    Psychiatric:         Mood and Affect: Mood normal.         Behavior: Behavior normal.     Discussion with Family: Updated  (stepdaughter) at bedside.    Discharge instructions/Information to patient and family:   See after visit summary for information provided to patient and family.      Provisions for Follow-Up Care:  See after visit summary for information related to follow-up care and any pertinent home health orders.      Mobility at time of Discharge:   Basic Mobility Inpatient Raw Score: 18  JH-HLM Goal: 6: Walk 10 steps or more  JH-HLM Achieved: 7: Walk 25 feet or more  HLM Goal achieved. Continue to encourage appropriate mobility.     Disposition:   Home with [facility]NA Services (Reminder: Complete face to face encounter)    Planned Readmission: No    Discharge Medications:  See after visit summary for reconciled discharge medications provided to patient and/or family.      Administrative Statements       **Please Note: This note may have been constructed using a voice recognition system**

## 2025-01-05 NOTE — ASSESSMENT & PLAN NOTE
UA with pyuria, bacteriuria  Urine culture growing E. coli, susceptible to ceftriaxone.  Patient received 4 days of ceftriaxone therapy while hospitalized, discharged on Vantin 200 mg twice daily for 1 days for total 5 days of therapy.

## 2025-01-05 NOTE — PROGRESS NOTES
Patient:    MRN:  [id number]    Lauren Request ID:  [id number]    Level of care reserved:  Home Health Agency    Partner Reserved:  [facility], [place] [id number] [phone]    Clinical needs requested:  occupational therapy, physical therapy, skilled nursing    Geography searched:  [phone]    Start of Service:    Request sent:  12:04pm EST on 1/5/2025 by Juana Herrera    Partner reserved:  1:37pm EST on 1/5/2025 by Juana Herrera    Choice list shared:  1:25pm EST on 1/5/2025 by Juana Herrera

## 2025-01-05 NOTE — PLAN OF CARE
Problem: PHYSICAL THERAPY ADULT  Goal: Performs mobility at highest level of function for planned discharge setting.  See evaluation for individualized goals.  Description: Treatment/Interventions: Functional transfer training, LE strengthening/ROM, Therapeutic exercise, Endurance training, Cognitive reorientation, Patient/family training, Equipment eval/education, Bed mobility, Gait training, Compensatory technique education, Spoke to nursing, Spoke to case management, OT          See flowsheet documentation for full assessment, interventions and recommendations.  Outcome: Progressing  Note: Prognosis: Good  Problem List: Decreased strength, Decreased endurance, Impaired balance, Decreased mobility, Decreased safety awareness, Obesity  Assessment: Pt seen for PT treatment session this date with interventions consisting of gait training w/ emphasis on improving pt's ability to ambulate level surfaces x 150' with close S provided by therapist with RW and Therapeutic exercise consisting of: AROM 5 reps B LE in standing with occ UE support position. Pt agreeable to PT treatment session upon arrival, pt found seated OOB in recliner, in no apparent distress. In comparison to previous session, pt with significant improvements in pt able to complete community distance mobility with RW and superision . Post session: pt returned back to recliner, chair alarm engaged, all needs in reach, and RN notified of session findings/recommendations Continue to recommend Level III Resource Intensity at time of d/c in order to maximize pt's functional independence and safety w/ mobility. Pt continues to be functioning below baseline level, and remains limited 2* factors listed above and including decreased strength, balance, mobility, and activity tolerance. PT will continue to see pt while here in order to address the deficits listed above and provide interventions consistent w/ POC in effort to achieve STGs.  Barriers to Discharge:  Other (Comment)  Barriers to Discharge Comments: current assist level for mobility  Rehab Resource Intensity Level, PT: III (Minimum Resource Intensity)    See flowsheet documentation for full assessment.

## 2025-01-05 NOTE — ASSESSMENT & PLAN NOTE
Presented from home with  due to increased weakness, unable to ambulate  History of Parkinson  Found to have UTI and suspected pneumonia  PT/OT: Patient was initially seen by PT and OT who recommended rehab placement, to which patient and her  were agreeable.  However as patient's clinical course improved so did her strength, she was reevaluated by PT on 01/5/25 with recommendations for outpatient PT/OT.  Patient was safe to discharge home.  She was seen ambulating to the bathroom and back with a walker and did well.

## 2025-02-01 PROBLEM — N39.0 UTI (URINARY TRACT INFECTION): Status: RESOLVED | Noted: 2025-01-02 | Resolved: 2025-02-01

## 2025-02-01 PROBLEM — J18.9 PNEUMONIA: Status: RESOLVED | Noted: 2025-01-02 | Resolved: 2025-02-01

## 2025-04-24 ENCOUNTER — OFFICE VISIT (OUTPATIENT)
Dept: URGENT CARE | Facility: CLINIC | Age: 75
End: 2025-04-24
Payer: COMMERCIAL

## 2025-04-24 VITALS
TEMPERATURE: 97.6 F | SYSTOLIC BLOOD PRESSURE: 154 MMHG | DIASTOLIC BLOOD PRESSURE: 90 MMHG | HEART RATE: 73 BPM | WEIGHT: 160 LBS | RESPIRATION RATE: 18 BRPM | OXYGEN SATURATION: 99 % | HEIGHT: 61 IN | BODY MASS INDEX: 30.21 KG/M2

## 2025-04-24 DIAGNOSIS — N30.01 ACUTE CYSTITIS WITH HEMATURIA: Primary | ICD-10-CM

## 2025-04-24 LAB
SL AMB  POCT GLUCOSE, UA: NEGATIVE
SL AMB LEUKOCYTE ESTERASE,UA: ABNORMAL
SL AMB POCT BILIRUBIN,UA: NEGATIVE
SL AMB POCT BLOOD,UA: ABNORMAL
SL AMB POCT CLARITY,UA: ABNORMAL
SL AMB POCT COLOR,UA: YELLOW
SL AMB POCT KETONES,UA: NEGATIVE
SL AMB POCT NITRITE,UA: NEGATIVE
SL AMB POCT PH,UA: 6
SL AMB POCT SPECIFIC GRAVITY,UA: 1.01
SL AMB POCT URINE PROTEIN: ABNORMAL
SL AMB POCT UROBILINOGEN: NEGATIVE

## 2025-04-24 PROCEDURE — 99213 OFFICE O/P EST LOW 20 MIN: CPT

## 2025-04-24 PROCEDURE — 87086 URINE CULTURE/COLONY COUNT: CPT

## 2025-04-24 PROCEDURE — S9088 SERVICES PROVIDED IN URGENT: HCPCS

## 2025-04-24 PROCEDURE — 81002 URINALYSIS NONAUTO W/O SCOPE: CPT

## 2025-04-24 PROCEDURE — 87186 SC STD MICRODIL/AGAR DIL: CPT

## 2025-04-24 PROCEDURE — 87077 CULTURE AEROBIC IDENTIFY: CPT

## 2025-04-24 RX ORDER — CEPHALEXIN 500 MG/1
500 CAPSULE ORAL EVERY 12 HOURS SCHEDULED
Qty: 14 CAPSULE | Refills: 0 | Status: SHIPPED | OUTPATIENT
Start: 2025-04-24 | End: 2025-05-01

## 2025-04-24 NOTE — PROGRESS NOTES
[facility]        NAME: Ankita Turpin is a 74 y.o. female  : 1950    MRN: [id number]  DATE: 2025  TIME: 5:35 PM    Assessment and Plan   Acute cystitis with hematuria [N30.01]  1. Acute cystitis with hematuria  POCT urine dip    Urine culture    cephalexin (KEFLEX) 500 mg capsule            Patient Instructions     Take Cephalexin as prescribed  Eat yogurt with live and active cultures and/or take a probiotic at least 3 hours before or after antibiotic dose. Monitor stool for diarrhea and/or blood. If this occurs, contact primary care doctor ASAP.     Drink plenty of water   Cranberry supplements  Urinate within 5 minutes following intercourse  Follow up with OB/GYN  Always wipe front to back  Avoid holding in urine    Follow up with PCP in 3-5 days.  Proceed to  ER if symptoms worsen.    If tests are performed, our office will contact you with results only if changes need to made to the care plan discussed with you at the visit. [facility]    Chief Complaint     Chief Complaint   Patient presents with    Possible UTI     C/o urinary frequency, burning after urination Onset x7 days ago. Pt took azo.         History of Present Illness       74 year old female arrives reporting urinary burning with increased frequency and urgency ongoing for past week.  Patient reports she has been taking over the counter azo for symptom relief.  Patient denies any fevers.  Patient denies any abdominal pain, nausea vomiting or diarrhea.  Patient denies any vaginal bleeding or discharge.      Review of Systems   Review of Systems   Constitutional: Negative.  Negative for fever.   HENT: Negative.     Respiratory: Negative.     Cardiovascular: Negative.    Gastrointestinal: Negative.  Negative for abdominal pain, constipation, diarrhea, nausea and vomiting.   Genitourinary:  Positive for dysuria, frequency and urgency. Negative for hematuria, vaginal bleeding  and vaginal discharge.   Musculoskeletal: Negative.          Current Medications       Current Outpatient Medications:     carbidopa-levodopa (SINEMET)  mg per tablet, Take 1 tablet by mouth 4 (four) times a day, Disp: , Rfl:     cephalexin (KEFLEX) 500 mg capsule, Take 1 capsule (500 mg total) by mouth every 12 (twelve) hours for 7 days, Disp: 14 capsule, Rfl: 0    ezetimibe (ZETIA) 10 mg tablet, Take by mouth, Disp: , Rfl:     metoprolol tartrate (LOPRESSOR) 25 mg tablet, Take by mouth, Disp: , Rfl:     aspirin 81 mg chewable tablet, Chew 81 mg daily, Disp: , Rfl:     cholecalciferol (VITAMIN D3) 1,000 units tablet, Take 1,000 Units by mouth daily TAKES 2,000 UNITS, Disp: , Rfl:     glycerin-hypromellose- (ARTIFICIAL TEARS) 0.2-0.2-1 % SOLN, Administer 1 drop to both eyes every 4 (four) hours as needed (dry eye), Disp: , Rfl: 0    levothyroxine 50 mcg tablet, TAKE 1 TABLET BY MOUTH DAILY AT LEAST 30 MINUTES PRIOR TO FIRST MEAL OF THE DAY OR OTHER MEDICATIONS, Disp: , Rfl:     omeprazole (PriLOSEC OTC) 20 MG tablet, Take by mouth, Disp: , Rfl:     oxybutynin (DITROPAN) 5 mg tablet, Take 1 tablet (5 mg total) by mouth 2 (two) times a day (Patient not taking: Reported on 11/13/2023), Disp: , Rfl: 0    Pimavanserin Tartrate (Nuplazid) 34 MG CAPS, Take 34 mg by mouth daily at bedtime, Disp: , Rfl:     pramipexole (MIRAPEX) 0.125 mg tablet, Take 0.125 mg by mouth 3 (three) times a day (Patient not taking: Reported on 4/24/2025), Disp: , Rfl:     simvastatin (ZOCOR) 10 mg tablet, Take by mouth (Patient not taking: Reported on 4/24/2025), Disp: , Rfl:     Current Allergies     Allergies as of 04/24/2025 - Reviewed 04/24/2025   Allergen Reaction Noted    Lidocaine Anaphylaxis 02/05/2015    Mushroom extract complex - food allergy Anaphylaxis and Throat Swelling 10/30/2022    Butamben-tetracaine-benzocaine Edema 12/04/2015    Codeine GI Intolerance 02/05/2015    Sertraline Itching 02/05/2015    Statins Myalgia  "08/21/2020            The following portions of the patient's history were reviewed and updated as appropriate: allergies, current medications, past family history, past medical history, past social history, past surgical history and problem list.     Past Medical History:   Diagnosis Date    Coronary artery disease     HLD (hyperlipidemia)     Hypothyroid     Parkinson disease (HCC)        Past Surgical History:   Procedure Laterality Date    CHOLECYSTECTOMY LAPAROSCOPIC N/A 11/14/2023    Procedure: CHOLECYSTECTOMY LAPAROSCOPIC;  Surgeon: Linda Gilman DO;  Location:  MAIN OR;  Service: General    ORIF TIBIA & FIBULA FRACTURES Left 10/31/2022    Procedure: OPEN REDUCTION W/ INTERNAL FIXATION (ORIF) ANKLE;  Surgeon: Jason Juarez;  Location:  MAIN OR;  Service: Orthopedics       No family history on file.      Medications have been verified.        Objective   /90   Pulse 73   Temp 97.6 °F (36.4 °C)   Resp 18   Ht 5' 1\" (1.549 m)   Wt 72.6 kg (160 lb)   SpO2 99%   BMI 30.23 kg/m²        Physical Exam     Physical Exam  Vitals and nursing note reviewed.   Constitutional:       General: She is not in acute distress.     Appearance: Normal appearance. She is not ill-appearing, toxic-appearing or diaphoretic.   HENT:      Head: Normocephalic.      Right Ear: External ear normal.      Left Ear: External ear normal.      Nose: Nose normal.   Eyes:      Pupils: Pupils are equal, round, and reactive to light.   Cardiovascular:      Rate and Rhythm: Normal rate and regular rhythm.      Pulses: Normal pulses.      Heart sounds: Normal heart sounds.   Pulmonary:      Effort: Pulmonary effort is normal. No respiratory distress.      Breath sounds: Normal breath sounds. No stridor. No wheezing, rhonchi or rales.   Chest:      Chest wall: No tenderness.   Abdominal:      General: Bowel sounds are normal. There is no distension.      Palpations: Abdomen is soft. There is no mass.      Tenderness: " There is no abdominal tenderness. There is no right CVA tenderness, left CVA tenderness, guarding or rebound.      Hernia: No hernia is present.   Musculoskeletal:         General: Normal range of motion.      Cervical back: Normal range of motion and neck supple.   Lymphadenopathy:      Cervical: No cervical adenopathy.   Skin:     General: Skin is warm and dry.      Capillary Refill: Capillary refill takes less than 2 seconds.   Neurological:      General: No focal deficit present.      Mental Status: She is alert and oriented to person, place, and time.   Psychiatric:         Mood and Affect: Mood normal.         Behavior: Behavior normal.

## 2025-04-24 NOTE — PATIENT INSTRUCTIONS
Take Cephalexin as prescribed  Eat yogurt with live and active cultures and/or take a probiotic at least 3 hours before or after antibiotic dose. Monitor stool for diarrhea and/or blood. If this occurs, contact primary care doctor ASAP.     Drink plenty of water   Cranberry supplements  Urinate within 5 minutes following intercourse  Follow up with OB/GYN  Always wipe front to back  Avoid holding in urine    Follow up with PCP in 3-5 days.  Proceed to  ER if symptoms worsen.    If tests are performed, our office will contact you with results only if changes need to made to the care plan discussed with you at the visit. You can review your full results on [facility].

## 2025-04-26 LAB
BACTERIA UR CULT: ABNORMAL
BACTERIA UR CULT: ABNORMAL

## 2025-04-27 ENCOUNTER — RESULTS FOLLOW-UP (OUTPATIENT)
Dept: URGENT CARE | Facility: CLINIC | Age: 75
End: 2025-04-27

## (undated) DEVICE — POV-IOD SOLUTION 4OZ BT

## (undated) DEVICE — DRAPE STERI 1010 18IN X 12IN

## (undated) DEVICE — TROCAR: Brand: KII FIOS FIRST ENTRY

## (undated) DEVICE — INTENDED FOR TISSUE SEPARATION, AND OTHER PROCEDURES THAT REQUIRE A SHARP SURGICAL BLADE TO PUNCTURE OR CUT.: Brand: BARD-PARKER SAFETY BLADES SIZE 11, STERILE

## (undated) DEVICE — CHLORAPREP HI-LITE 26ML ORANGE

## (undated) DEVICE — SUT VICRYL 4-0 PS-2 27 IN J426H

## (undated) DEVICE — 2.5MM DRILL BIT/QC/GOLD/110MM

## (undated) DEVICE — TELFA NON-ADHERENT ABSORBENT DRESSING: Brand: TELFA

## (undated) DEVICE — GLOVE SRG BIOGEL ECLIPSE 7

## (undated) DEVICE — INTENDED FOR TISSUE SEPARATION, AND OTHER PROCEDURES THAT REQUIRE A SHARP SURGICAL BLADE TO PUNCTURE OR CUT.: Brand: BARD-PARKER SAFETY BLADES SIZE 15, STERILE

## (undated) DEVICE — SURGICEL 2 X 3

## (undated) DEVICE — PADDING CAST 4 IN  COTTON STRL

## (undated) DEVICE — ADHESIVE SKIN HIGH VISCOSITY EXOFIN 1ML

## (undated) DEVICE — ENDOPOUCH RETRIEVER SPECIMEN RETRIEVAL BAGS: Brand: ENDOPOUCH RETRIEVER

## (undated) DEVICE — PMI DISPOSABLE PUNCTURE CLOSURE DEVICE / SUTURE GRASPER: Brand: PMI

## (undated) DEVICE — SYRINGE 10ML LL

## (undated) DEVICE — PAD GROUNDING ADULT

## (undated) DEVICE — PENCIL ELECTROSURG E-Z CLEAN -0035H

## (undated) DEVICE — TROCAR: Brand: KII SLEEVE

## (undated) DEVICE — BULB SYRINGE,IRRIGATION WITH PROTECTIVE CAP: Brand: DOVER

## (undated) DEVICE — LIGACLIP 10-M/L, 10MM ENDOSCOPIC ROTATING MULTIPLE CLIP APPLIERS: Brand: LIGACLIP

## (undated) DEVICE — ELECTRODE LAP J HOOK E-Z CLEAN 33CM-0021

## (undated) DEVICE — GLOVE INDICATOR PI UNDERGLOVE SZ 9 BLUE

## (undated) DEVICE — ACE WRAP 4 IN STERILE

## (undated) DEVICE — ALLENTOWN LAP CHOLE APP PACK: Brand: CARDINAL HEALTH

## (undated) DEVICE — 2.0MM DRILL BIT WITH DEPTH MARK/QC/140MM

## (undated) DEVICE — VISUALIZATION SYSTEM: Brand: CLEARIFY

## (undated) DEVICE — SUT VICRYL 0 REEL 54 IN J287G

## (undated) DEVICE — DRAPE C-ARM X-RAY

## (undated) DEVICE — UNDYED BRAIDED (POLYGLACTIN 910), SYNTHETIC ABSORBABLE SUTURE: Brand: COATED VICRYL

## (undated) DEVICE — GLOVE SRG BIOGEL 6

## (undated) DEVICE — CUFF TOURNIQUET 30 X 4 IN QUICK CONNECT DISP 1BLA

## (undated) DEVICE — SPLINT COMFORT 4 X 30

## (undated) DEVICE — TUBING INSUFFLATION SET ISO CONNECTOR

## (undated) DEVICE — PAD CAST 6 IN COTTON NON STERILE

## (undated) DEVICE — ALL PURPOSE SPONGES,NON-WOVEN, 4 PLY: Brand: CURITY

## (undated) DEVICE — CAST PADDING 4 IN SYNTHETIC STRL

## (undated) DEVICE — 3.5MM CORTEX SCREW SELF-TAPPING 18MM
Type: IMPLANTABLE DEVICE | Site: ANKLE | Status: NON-FUNCTIONAL
Removed: 2022-10-31

## (undated) DEVICE — BETHLEHEM UNIVERSAL  MIONR EXT: Brand: CARDINAL HEALTH

## (undated) DEVICE — GLOVE INDICATOR PI UNDERGLOVE SZ 6.5 BLUE

## (undated) DEVICE — TUBING SUCTION 5MM X 12 FT

## (undated) DEVICE — NEEDLE 18 G X 1 1/2 SAFETY

## (undated) DEVICE — IRRIG ENDO FLO TUBING

## (undated) DEVICE — SPONGE LAP 18 X 18 IN STRL RFD

## (undated) DEVICE — 3.5MM DRILL BIT/QC/110MM

## (undated) DEVICE — GAUZE SPONGES,16 PLY: Brand: CURITY

## (undated) DEVICE — GLOVE SRG BIOGEL ECLIPSE 7.5

## (undated) DEVICE — SUT VICRYL 2-0 CP-1 27 IN J266H

## (undated) DEVICE — GLOVE INDICATOR PI UNDERGLOVE SZ 7.5 BLUE

## (undated) DEVICE — ENDOPATH PNEUMONEEDLE INSUFFLATION NEEDLES WITH LUER LOCK CONNECTORS 120MM: Brand: ENDOPATH

## (undated) DEVICE — BANDAGE, ESMARK LF STR 6"X9' (20/CS): Brand: CYPRESS

## (undated) DEVICE — ACE WRAP 6 IN STERILE

## (undated) DEVICE — NEPTUNE E-SEP SMOKE EVACUATION PENCIL, COATED, 70MM BLADE, PUSH BUTTON SWITCH: Brand: NEPTUNE E-SEP

## (undated) DEVICE — COBAN 6 IN STERILE

## (undated) DEVICE — GLOVE INDICATOR PI UNDERGLOVE SZ 7 BLUE